# Patient Record
Sex: FEMALE | Race: WHITE | URBAN - METROPOLITAN AREA
[De-identification: names, ages, dates, MRNs, and addresses within clinical notes are randomized per-mention and may not be internally consistent; named-entity substitution may affect disease eponyms.]

---

## 2020-07-21 ENCOUNTER — APPOINTMENT (RX ONLY)
Dept: URBAN - METROPOLITAN AREA CLINIC 349 | Facility: CLINIC | Age: 40
Setting detail: DERMATOLOGY
End: 2020-07-21

## 2020-07-21 DIAGNOSIS — Z12.83 ENCOUNTER FOR SCREENING FOR MALIGNANT NEOPLASM OF SKIN: ICD-10-CM

## 2020-07-21 DIAGNOSIS — D22 MELANOCYTIC NEVI: ICD-10-CM | Status: STABLE

## 2020-07-21 DIAGNOSIS — L82.1 OTHER SEBORRHEIC KERATOSIS: ICD-10-CM

## 2020-07-21 DIAGNOSIS — Z71.89 OTHER SPECIFIED COUNSELING: ICD-10-CM

## 2020-07-21 DIAGNOSIS — Z85.828 PERSONAL HISTORY OF OTHER MALIGNANT NEOPLASM OF SKIN: ICD-10-CM

## 2020-07-21 DIAGNOSIS — I78.1 NEVUS, NON-NEOPLASTIC: ICD-10-CM

## 2020-07-21 DIAGNOSIS — L81.2 FRECKLES: ICD-10-CM

## 2020-07-21 DIAGNOSIS — D18.0 HEMANGIOMA: ICD-10-CM

## 2020-07-21 PROBLEM — D22.61 MELANOCYTIC NEVI OF RIGHT UPPER LIMB, INCLUDING SHOULDER: Status: ACTIVE | Noted: 2020-07-21

## 2020-07-21 PROBLEM — D22.72 MELANOCYTIC NEVI OF LEFT LOWER LIMB, INCLUDING HIP: Status: ACTIVE | Noted: 2020-07-21

## 2020-07-21 PROBLEM — D18.01 HEMANGIOMA OF SKIN AND SUBCUTANEOUS TISSUE: Status: ACTIVE | Noted: 2020-07-21

## 2020-07-21 PROBLEM — D22.5 MELANOCYTIC NEVI OF TRUNK: Status: ACTIVE | Noted: 2020-07-21

## 2020-07-21 PROBLEM — F32.9 MAJOR DEPRESSIVE DISORDER, SINGLE EPISODE, UNSPECIFIED: Status: ACTIVE | Noted: 2020-07-21

## 2020-07-21 PROBLEM — D22.62 MELANOCYTIC NEVI OF LEFT UPPER LIMB, INCLUDING SHOULDER: Status: ACTIVE | Noted: 2020-07-21

## 2020-07-21 PROCEDURE — ? EDUCATIONAL RESOURCES PROVIDED

## 2020-07-21 PROCEDURE — ? COUNSELING

## 2020-07-21 PROCEDURE — 99202 OFFICE O/P NEW SF 15 MIN: CPT

## 2020-07-21 PROCEDURE — ? OTHER

## 2020-07-21 ASSESSMENT — LOCATION ZONE DERM
LOCATION ZONE: TRUNK
LOCATION ZONE: LIP
LOCATION ZONE: LEG
LOCATION ZONE: NECK
LOCATION ZONE: ARM

## 2020-07-21 ASSESSMENT — LOCATION DETAILED DESCRIPTION DERM
LOCATION DETAILED: LEFT MID-UPPER BACK
LOCATION DETAILED: RIGHT ANTERIOR DISTAL THIGH
LOCATION DETAILED: LEFT PROXIMAL CALF
LOCATION DETAILED: RIGHT LATERAL SUPERIOR CHEST
LOCATION DETAILED: RIGHT VENTRAL PROXIMAL FOREARM
LOCATION DETAILED: RIGHT ANTERIOR DISTAL UPPER ARM
LOCATION DETAILED: LEFT MEDIAL TRAPEZIAL NECK
LOCATION DETAILED: LEFT MEDIAL SUPERIOR CHEST
LOCATION DETAILED: LEFT SUPERIOR MEDIAL MIDBACK
LOCATION DETAILED: LEFT ANTERIOR DISTAL UPPER ARM
LOCATION DETAILED: LEFT SUPERIOR UPPER BACK
LOCATION DETAILED: RIGHT MID-UPPER BACK
LOCATION DETAILED: LEFT VENTRAL PROXIMAL FOREARM
LOCATION DETAILED: LEFT RIB CAGE
LOCATION DETAILED: LEFT ANTERIOR PROXIMAL THIGH
LOCATION DETAILED: EPIGASTRIC SKIN
LOCATION DETAILED: LEFT INFERIOR MEDIAL UPPER BACK
LOCATION DETAILED: RIGHT NASOLABIAL FOLD

## 2020-07-21 ASSESSMENT — LOCATION SIMPLE DESCRIPTION DERM
LOCATION SIMPLE: LEFT UPPER BACK
LOCATION SIMPLE: LEFT CALF
LOCATION SIMPLE: LEFT THIGH
LOCATION SIMPLE: CHEST
LOCATION SIMPLE: POSTERIOR NECK
LOCATION SIMPLE: RIGHT UPPER BACK
LOCATION SIMPLE: ABDOMEN
LOCATION SIMPLE: RIGHT LIP
LOCATION SIMPLE: LEFT FOREARM
LOCATION SIMPLE: LEFT UPPER ARM
LOCATION SIMPLE: LEFT LOWER BACK
LOCATION SIMPLE: RIGHT UPPER ARM
LOCATION SIMPLE: RIGHT THIGH
LOCATION SIMPLE: RIGHT FOREARM

## 2020-07-21 NOTE — PROCEDURE: OTHER
Detail Level: Zone
Note Text (......Xxx Chief Complaint.): This diagnosis correlates with the
Other (Free Text): Discussed treatment options. Patient defers treatment at this time.
Other (Free Text): Compared to photos. Lesions appears stable.

## 2020-07-21 NOTE — HPI: SKIN LESION
How Severe Is Your Skin Lesion?: mild
Has Your Skin Lesion Been Treated?: not been treated
Is This A New Presentation, Or A Follow-Up?: Skin Lesion
Which Family Member (Optional)?: Father
Additional History: Patient is here for a full body skin check. Patient denies any lesions of concern and denies any growing, bleeding or changing. Patient denies family or personal history of melanoma but she has a personal and family history of non melanoma skin cancer. She states she is unsure of exactly when her BCC was but it was over five years ago.

## 2021-04-08 ENCOUNTER — ANESTHESIA EVENT (OUTPATIENT)
Dept: ENDOSCOPY | Age: 41
End: 2021-04-08
Payer: COMMERCIAL

## 2021-04-08 ENCOUNTER — HOSPITAL ENCOUNTER (OUTPATIENT)
Dept: ULTRASOUND IMAGING | Age: 41
Discharge: HOME | End: 2021-04-08
Attending: INTERNAL MEDICINE
Payer: COMMERCIAL

## 2021-04-08 VITALS
BODY MASS INDEX: 22.66 KG/M2 | OXYGEN SATURATION: 100 % | SYSTOLIC BLOOD PRESSURE: 102 MMHG | RESPIRATION RATE: 16 BRPM | WEIGHT: 120 LBS | DIASTOLIC BLOOD PRESSURE: 58 MMHG | HEART RATE: 76 BPM | TEMPERATURE: 99.2 F | HEIGHT: 61 IN

## 2021-04-08 DIAGNOSIS — K74.69 OTHER CIRRHOSIS OF LIVER (HCC): ICD-10-CM

## 2021-04-08 PROCEDURE — 74011000250 HC RX REV CODE- 250: Performed by: RADIOLOGY

## 2021-04-08 PROCEDURE — 88307 TISSUE EXAM BY PATHOLOGIST: CPT

## 2021-04-08 PROCEDURE — 88312 SPECIAL STAINS GROUP 1: CPT

## 2021-04-08 PROCEDURE — 99153 MOD SED SAME PHYS/QHP EA: CPT

## 2021-04-08 PROCEDURE — 99152 MOD SED SAME PHYS/QHP 5/>YRS: CPT

## 2021-04-08 PROCEDURE — 77030036720 US GUIDE BX LIV PERC

## 2021-04-08 PROCEDURE — 88313 SPECIAL STAINS GROUP 2: CPT

## 2021-04-08 PROCEDURE — 74011250636 HC RX REV CODE- 250/636: Performed by: RADIOLOGY

## 2021-04-08 RX ORDER — MORPHINE SULFATE 2 MG/ML
1 INJECTION, SOLUTION INTRAMUSCULAR; INTRAVENOUS
Status: DISCONTINUED | OUTPATIENT
Start: 2021-04-08 | End: 2021-04-12 | Stop reason: HOSPADM

## 2021-04-08 RX ORDER — FENTANYL CITRATE 50 UG/ML
25-100 INJECTION, SOLUTION INTRAMUSCULAR; INTRAVENOUS
Status: DISCONTINUED | OUTPATIENT
Start: 2021-04-08 | End: 2021-04-12 | Stop reason: HOSPADM

## 2021-04-08 RX ORDER — IBUPROFEN 200 MG
600 TABLET ORAL
Status: DISCONTINUED | OUTPATIENT
Start: 2021-04-08 | End: 2021-04-12 | Stop reason: HOSPADM

## 2021-04-08 RX ORDER — OXYCODONE HYDROCHLORIDE 5 MG/1
10 TABLET ORAL
Status: DISCONTINUED | OUTPATIENT
Start: 2021-04-08 | End: 2021-04-12 | Stop reason: HOSPADM

## 2021-04-08 RX ORDER — SODIUM CHLORIDE 9 MG/ML
150 INJECTION, SOLUTION INTRAVENOUS CONTINUOUS
Status: DISCONTINUED | OUTPATIENT
Start: 2021-04-08 | End: 2021-04-09 | Stop reason: HOSPADM

## 2021-04-08 RX ORDER — SODIUM CHLORIDE, SODIUM LACTATE, POTASSIUM CHLORIDE, CALCIUM CHLORIDE 600; 310; 30; 20 MG/100ML; MG/100ML; MG/100ML; MG/100ML
100 INJECTION, SOLUTION INTRAVENOUS CONTINUOUS
Status: CANCELLED | OUTPATIENT
Start: 2021-04-08

## 2021-04-08 RX ORDER — MIDAZOLAM HYDROCHLORIDE 1 MG/ML
.5-2 INJECTION, SOLUTION INTRAMUSCULAR; INTRAVENOUS
Status: DISCONTINUED | OUTPATIENT
Start: 2021-04-08 | End: 2021-04-12 | Stop reason: HOSPADM

## 2021-04-08 RX ORDER — DIPHENHYDRAMINE HYDROCHLORIDE 50 MG/ML
25-50 INJECTION, SOLUTION INTRAMUSCULAR; INTRAVENOUS ONCE
Status: COMPLETED | OUTPATIENT
Start: 2021-04-08 | End: 2021-04-08

## 2021-04-08 RX ORDER — LIDOCAINE HYDROCHLORIDE 20 MG/ML
1-20 INJECTION, SOLUTION INFILTRATION; PERINEURAL ONCE
Status: COMPLETED | OUTPATIENT
Start: 2021-04-08 | End: 2021-04-08

## 2021-04-08 RX ADMIN — MIDAZOLAM 1 MG: 1 INJECTION INTRAMUSCULAR; INTRAVENOUS at 10:26

## 2021-04-08 RX ADMIN — MIDAZOLAM 0.5 MG: 1 INJECTION INTRAMUSCULAR; INTRAVENOUS at 10:33

## 2021-04-08 RX ADMIN — DIPHENHYDRAMINE HYDROCHLORIDE 50 MG: 50 INJECTION, SOLUTION INTRAMUSCULAR; INTRAVENOUS at 10:04

## 2021-04-08 RX ADMIN — FENTANYL CITRATE 50 MCG: 50 INJECTION, SOLUTION INTRAMUSCULAR; INTRAVENOUS at 10:26

## 2021-04-08 RX ADMIN — LIDOCAINE HYDROCHLORIDE 9 ML: 20 INJECTION, SOLUTION INFILTRATION; PERINEURAL at 10:43

## 2021-04-08 RX ADMIN — FENTANYL CITRATE 25 MCG: 50 INJECTION, SOLUTION INTRAMUSCULAR; INTRAVENOUS at 10:33

## 2021-04-08 NOTE — H&P
Department of Interventional Radiology  [phone]    History and Physical    Patient:  Adan Petersen MRN:  [id number]  SSN:  [id number]    YOB: 1980  Age:  36 y.o. Sex:  female      Primary Care Provider:  UNKNOWN  Referring Physician:  Almaz Esparza MD    Subjective:     Chief Complaint: biopsy    History of the Present Illness: The patient is a 36 y.o. female with cryptogenic cirrhosis, elevated LFTs who presents for random liver biopsy. Epigastric pain, imaging demonstrates mildly nodular liver with some fatty changes, portal HTN splenomegaly. NPO. Endoscopy scheduled for tomorrow. Past Medical History:   Diagnosis Date    Depression     medications     GERD (gastroesophageal reflux disease)     medications    Liver disease 2021    patient states that \"something is wrong with my liver\" but states has not been diagnosed.  Low iron      Past Surgical History:   Procedure Laterality Date    HX APPENDECTOMY      HX  SECTION      HX DILATION AND CURETTAGE      HX TONSILLECTOMY          Review of Systems:    Pertinent items are noted in the History of Present Illness. Prior to Admission medications    Medication Sig Start Date End Date Taking? Authorizing Provider   cetirizine (ZYRTEC) 10 mg tablet Take 10 mg by mouth daily. 20   Provider, Historical   FLUoxetine (PROzac) 40 mg capsule Take 80 mg by mouth daily. 20   Provider, Historical   magnesium oxide (MAG-OX) 400 mg tablet Take  by mouth daily. Provider, Historical   therapeutic multivitamin (THERAGRAN) tablet Take 1 Tab by mouth daily. Provider, Historical   valACYclovir (VALTREX) 1 gram tablet Take 1,000 mg by mouth daily. 20   Provider, Historical   pantoprazole (PROTONIX) 20 mg tablet Take 20 mg by mouth daily. Provider, Historical        Allergies   Allergen Reactions    Sulfa (Sulfonamide Antibiotics) Rash       History reviewed.  No pertinent family history. Social History     Tobacco Use    Smoking status: Never Smoker    Smokeless tobacco: Never Used   Substance Use Topics    Alcohol use: Not Currently        Objective:       Physical Examination:    Vitals:    04/08/21 0839   BP: (!) 101/55   Pulse: 81   Resp: 18   Temp: 99.2 °F (37.3 °C)   SpO2: 99%   Weight: 54.4 kg (120 lb)   Height: 5' 1\" (1.549 m)       Pain Assessment  Pain Intensity 1: 0 (04/08/21 0839)               HEART: regular rate and rhythm  LUNG: clear to auscultation bilaterally  ABDOMEN: normal findings: soft, non-tender  EXTREMITIES: warm, no edema    Laboratory:     No results found for: NA, K, CL, CO2, AGAP, GLU, BUN, CREA, GFRAA, GFRNA, CA, MG, PHOS, ALB, TBIL, TP, ALB, GLOB, AGRAT, ALT  No results found for: WBC, HGB, HCT, PLT, HGBEXT, HCTEXT, PLTEXT  No results found for: APTT, PTP, INR, INREXT    Assessment:     Cirrhosis, elevated LFTs        Plan:     Planned Procedure:  Liver biopsy    Risks, benefits, and alternatives reviewed with patient and she agrees to proceed with the procedure.       Signed By: Laura Nash PA-C     April 8, 2021

## 2021-04-08 NOTE — PROCEDURES
Department of Interventional Radiology  [phone]        Interventional Radiology Brief Procedure Note    Patient: Brooks Garnett MRN: [id number]  SSN: [id number]    YOB: 1980  Age: 36 y.o. Sex: female      Date of Procedure: 4/8/2021    Pre-Procedure Diagnosis: Elevated LFts    Post-Procedure Diagnosis: SAME    Procedure(s): Image Guided Biopsy    Brief Description of Procedure: Left lobe liver core biopsy. Performed By: Snehal Carter MD     Assistants: None    Anesthesia:Moderate Sedation    Estimated Blood Loss: Less than 10ml    Specimens:  Pathology    Implants:  None    Findings: Nodular surface.      Complications: None    Recommendations: 1 hour bedrest.       Follow Up: Dr Cj Saunders    Signed By: Snehal Carter MD     April 8, 2021

## 2021-04-08 NOTE — DISCHARGE INSTRUCTIONS
[address]  Department of Interventional Radiology  [facility]  [phone] Office  [phone] Fax    BIOPSY DISCHARGE INSTRUCTIONS    General Instructions:     A biopsy is the removal of a small piece of tissue for microscopic examination or testing. Healthy tissue can be obtained for the purpose of tissue-type matching for transplants. Unhealthy tissues are more commonly biopsied to diagnose disease. Lung Biopsy:     A needle lung biopsy is performed when there is a mass discovered in the lung area. The most serious risk is infection, bleeding, and pneumothorax (a collapsed lung). Signs of pneumothorax include shortness of breath, rapid heart rate, and blueness of the skin. If any of these occur, call 911. Liver Biopsy: This test helps detect cancer, infections, and the cause of an enlargement of the liver or elevated liver enzymes. It also helps to diagnose a number of liver diseases. The pain associated with the procedure may be felt in the shoulder. The risks include internal bleeding, pneumothorax, and injury to the surrounding organs. Renal Biopsy: This procedure is sometimes done to evaluate a transplanted kidney. It is also used to evaluate unexplained decrease in kidney function, blood, or protein in the urine. You may see bright red blood in the urine the first 24 hours after the test. If the bleeding lasts longer, you need to call your doctor. There is a risk of infection and bleeding into the muscle, which may cause soreness. Spinal Biopsy: This test is sometimes done in conjunction with other procedures. Your back will be sore, as we are taking a small sample of bone, which is slightly more difficult to sample than tissue. General Biopsy:     A mass can grow in any area of the body, and we are taking a specimen as ordered by your doctor. The risks are the same.  They include bleeding, pain, and infection. Home Care Instructions: You may resume your regular diet and medication regimen. Do not drink alcohol, drive, or make any important legal decisions in the next 24 hours. Do not lift anything heavier than a gallon of milk until the soreness goes away. You may use over the counter acetaminophen or ibuprofen for the soreness. You may apply an ice pack to the affected area for 20-30 minutes at time for the first 24 hours. After that, you may apply a heat pack. Call If: You should call your Physician and/or the Radiology Nurse if you have any questions or concerns about the biopsy site. Call if you should have increased pain, fever, redness, drainage, or bleeding more than a small spot on the bandage. Follow-Up Instructions: Please see your ordering doctor as he/she has requested. To Reach Us: If you have any questions about your procedure, please call the Interventional Radiology department at [phone]. After business hours (5pm) and weekends, call the answering service at [phone] and ask for the Radiologist on call to be paged. Si tiene Preguntas acerca del procedimiento, por favor llame al departamento de Radiología Intervencional al [phone]. Después de horas de oficina (5 pm) y los fines de [place], llamar al [facility] al [phone] y pregunte por el Radiologo de [facility]. Interventional Radiology General Nurse Discharge    After general anesthesia or intravenous sedation, for 24 hours or while taking prescription Narcotics:  · Limit your activities  · Do not drive and operate hazardous machinery  · Do not make important personal or business decisions  · Do  not drink alcoholic beverages  · If you have not urinated within 8 hours after discharge, please contact your surgeon on call. * Please give a list of your current medications to your Primary Care Provider.   * Please update this list whenever your medications are discontinued, doses are changed, or new medications (including over-the-counter products) are added. * Please carry medication information at all times in case of emergency situations. These are general instructions for a healthy lifestyle:    No smoking/ No tobacco products/ Avoid exposure to second hand smoke  Surgeon General's Warning:  Quitting smoking now greatly reduces serious risk to your health. Obesity, smoking, and sedentary lifestyle greatly increases your risk for illness  A healthy diet, regular physical exercise & weight monitoring are important for maintaining a healthy lifestyle    You may be retaining fluid if you have a history of heart failure or if you experience any of the following symptoms:  Weight gain of 3 pounds or more overnight or 5 pounds in a week, increased swelling in our hands or feet or shortness of breath while lying flat in bed. Please call your doctor as soon as you notice any of these symptoms; do not wait until your next office visit. Recognize signs and symptoms of STROKE:  F-face looks uneven    A-arms unable to move or move unevenly    S-speech slurred or non-existent    T-time-call 911 as soon as signs and symptoms begin-DO NOT go       Back to bed or wait to see if you get better-TIME IS BRAIN.       Patient Signature:  Date: 4/8/2021  Discharging Nurse: Reina Tello

## 2021-04-08 NOTE — PROGRESS NOTES
Recovery period without difficulty. Pt alert and oriented and denies pain. Dressing is clean, dry, and intact. Reviewed discharge instructions with patient and familly, both verbalized understanding. Pt escorted to lobby discharge area via wheelchair. Vital signs and Ac score completed.

## 2021-04-09 ENCOUNTER — HOSPITAL ENCOUNTER (OUTPATIENT)
Age: 41
Setting detail: OUTPATIENT SURGERY
Discharge: HOME | End: 2021-04-09
Attending: INTERNAL MEDICINE | Admitting: INTERNAL MEDICINE
Payer: COMMERCIAL

## 2021-04-09 ENCOUNTER — ANESTHESIA (OUTPATIENT)
Dept: ENDOSCOPY | Age: 41
End: 2021-04-09
Payer: COMMERCIAL

## 2021-04-09 VITALS
TEMPERATURE: 98.6 F | WEIGHT: 120 LBS | RESPIRATION RATE: 16 BRPM | SYSTOLIC BLOOD PRESSURE: 90 MMHG | BODY MASS INDEX: 22.66 KG/M2 | DIASTOLIC BLOOD PRESSURE: 55 MMHG | HEIGHT: 61 IN | HEART RATE: 86 BPM | OXYGEN SATURATION: 98 %

## 2021-04-09 PROCEDURE — 76060000031 HC ANESTHESIA FIRST 0.5 HR: Performed by: INTERNAL MEDICINE

## 2021-04-09 PROCEDURE — 77030021593 HC FCPS BIOP ENDOSC BSC -A: Performed by: INTERNAL MEDICINE

## 2021-04-09 PROCEDURE — 74011250636 HC RX REV CODE- 250/636: Performed by: ANESTHESIOLOGY

## 2021-04-09 PROCEDURE — 76040000025: Performed by: INTERNAL MEDICINE

## 2021-04-09 PROCEDURE — 74011000250 HC RX REV CODE- 250: Performed by: REGISTERED NURSE

## 2021-04-09 PROCEDURE — 88305 TISSUE EXAM BY PATHOLOGIST: CPT

## 2021-04-09 PROCEDURE — 2709999900 HC NON-CHARGEABLE SUPPLY: Performed by: INTERNAL MEDICINE

## 2021-04-09 PROCEDURE — 88312 SPECIAL STAINS GROUP 1: CPT

## 2021-04-09 PROCEDURE — 74011250636 HC RX REV CODE- 250/636: Performed by: REGISTERED NURSE

## 2021-04-09 RX ORDER — PROPOFOL 10 MG/ML
INJECTION, EMULSION INTRAVENOUS
Status: DISCONTINUED | OUTPATIENT
Start: 2021-04-09 | End: 2021-04-09 | Stop reason: HOSPADM

## 2021-04-09 RX ORDER — LIDOCAINE HYDROCHLORIDE 20 MG/ML
INJECTION, SOLUTION EPIDURAL; INFILTRATION; INTRACAUDAL; PERINEURAL AS NEEDED
Status: DISCONTINUED | OUTPATIENT
Start: 2021-04-09 | End: 2021-04-09 | Stop reason: HOSPADM

## 2021-04-09 RX ORDER — PROPOFOL 10 MG/ML
INJECTION, EMULSION INTRAVENOUS AS NEEDED
Status: DISCONTINUED | OUTPATIENT
Start: 2021-04-09 | End: 2021-04-09 | Stop reason: HOSPADM

## 2021-04-09 RX ORDER — SODIUM CHLORIDE, SODIUM LACTATE, POTASSIUM CHLORIDE, CALCIUM CHLORIDE 600; 310; 30; 20 MG/100ML; MG/100ML; MG/100ML; MG/100ML
100 INJECTION, SOLUTION INTRAVENOUS CONTINUOUS
Status: DISCONTINUED | OUTPATIENT
Start: 2021-04-09 | End: 2021-04-12 | Stop reason: HOSPADM

## 2021-04-09 RX ADMIN — PROPOFOL 200 MCG/KG/MIN: 10 INJECTION, EMULSION INTRAVENOUS at 15:20

## 2021-04-09 RX ADMIN — LIDOCAINE HYDROCHLORIDE 80 MG: 20 INJECTION, SOLUTION EPIDURAL; INFILTRATION; INTRACAUDAL; PERINEURAL at 15:19

## 2021-04-09 RX ADMIN — PHENYLEPHRINE HYDROCHLORIDE 50 MCG: 10 INJECTION INTRAVENOUS at 15:31

## 2021-04-09 RX ADMIN — PHENYLEPHRINE HYDROCHLORIDE 50 MCG: 10 INJECTION INTRAVENOUS at 15:25

## 2021-04-09 RX ADMIN — PROPOFOL 20 MG: 10 INJECTION, EMULSION INTRAVENOUS at 15:21

## 2021-04-09 RX ADMIN — SODIUM CHLORIDE, SODIUM LACTATE, POTASSIUM CHLORIDE, AND CALCIUM CHLORIDE 100 ML/HR: 600; 310; 30; 20 INJECTION, SOLUTION INTRAVENOUS at 14:04

## 2021-04-09 RX ADMIN — PROPOFOL 60 MG: 10 INJECTION, EMULSION INTRAVENOUS at 15:20

## 2021-04-09 RX ADMIN — PHENYLEPHRINE HYDROCHLORIDE 50 MCG: 10 INJECTION INTRAVENOUS at 15:28

## 2021-04-09 NOTE — H&P
Gastroenterology Outpatient History and Physical    Patient: Derik Ansari [facility]    Physician: Deepa Pat MD    Vital Signs: Blood pressure (!) 99/57, pulse 84, temperature 98.9 °F (37.2 °C), resp. rate 17, height 5' 1\" (1.549 m), weight 54.4 kg (120 lb), last menstrual period 2021, SpO2 100 %, not currently breastfeeding. Allergies: Allergies   Allergen Reactions    Sulfa (Sulfonamide Antibiotics) Rash       Chief Complaint: liver cirrhosis, screening for varices    History of Present Illness: As Above    Justification for Procedure: As Above    History:  Past Medical History:   Diagnosis Date    Depression     medications     GERD (gastroesophageal reflux disease)     medications    Liver disease 2021    patient states that \"something is wrong with my liver\" but states has not been diagnosed.  Low iron       Past Surgical History:   Procedure Laterality Date    HX APPENDECTOMY      HX  SECTION      HX DILATION AND CURETTAGE      HX TONSILLECTOMY        Social History     Socioeconomic History    Marital status:      Spouse name: Not on file    Number of children: Not on file    Years of education: Not on file    Highest education level: Not on file   Tobacco Use    Smoking status: Never Smoker    Smokeless tobacco: Never Used   Substance and Sexual Activity    Alcohol use: Not Currently    Drug use: Not Currently    History reviewed. No pertinent family history. Medications:   Prior to Admission medications    Medication Sig Start Date End Date Taking? Authorizing Provider   cetirizine (ZYRTEC) 10 mg tablet Take 10 mg by mouth daily. 20  Yes Provider, Historical   FLUoxetine (PROzac) 40 mg capsule Take 80 mg by mouth daily. 20  Yes Provider, Historical   magnesium oxide (MAG-OX) 400 mg tablet Take  by mouth daily. Yes Provider, Historical   therapeutic multivitamin (THERAGRAN) tablet Take 1 Tab by mouth daily.    Yes Provider, Historical   valACYclovir (VALTREX) 1 gram tablet Take 1,000 mg by mouth daily. 7/29/20  Yes Provider, Historical   pantoprazole (PROTONIX) 20 mg tablet Take 20 mg by mouth daily.    Yes Provider, Historical       Physical Exam:         Heart: regular rate and rhythm, S1, S2 normal, no murmur, click, rub or gallop   Lungs: chest clear, no wheezing, rales, normal symmetric air entry, Heart exam - S1, S2 normal, no murmur, no gallop, rate regular   Abdominal: Bowel sounds are normal, Bowel sounds active, liver is not enlarged, spleen is not enlarged           Findings/Diagnosis:  liver cirrhosis, screening for varices    EGD with possible banding        Signed:  Otis Robison MD 4/9/2021

## 2021-04-09 NOTE — PROGRESS NOTES
Patient's BP 91/52.  [facility] aware. Patient verbalized her baseline BP runs low and that she is feeling fine. Minor drowsiness due to post anesthesia. [facility].

## 2021-04-09 NOTE — ANESTHESIA POSTPROCEDURE EVALUATION
Procedure(s):  ESOPHAGOGASTRODUODENOSCOPY (EGD) W/ BANDING OF VARICES/ BMI 22  ESOPHAGOGASTRODUODENAL (EGD) BIOPSY. total IV anesthesia    Anesthesia Post Evaluation        Patient location during evaluation: PACU  Patient participation: complete - patient participated  Level of consciousness: awake and alert  Pain management: adequate  Airway patency: patent  Anesthetic complications: no  Cardiovascular status: acceptable  Respiratory status: acceptable  Hydration status: acceptable  Post anesthesia nausea and vomiting:  none  Final Post Anesthesia Temperature Assessment:  Normothermia (36.0-37.5 degrees C)      INITIAL Post-op Vital signs:   Vitals Value Taken Time   BP 90/55 04/09/21 1621   Temp     Pulse 81 04/09/21 1624   Resp 16 04/09/21 1601   SpO2 99 % 04/09/21 1624   Vitals shown include unvalidated device data.

## 2021-04-09 NOTE — ROUTINE PROCESS
Patient denies any needs. Vital signs stable. Discharge instructions given to patient and . No other concerns noted at this time. Patient wheeled out to car via wheelchair with staff. IV acces out.

## 2021-04-09 NOTE — ANESTHESIA PREPROCEDURE EVALUATION
Relevant Problems   No relevant active problems       Anesthetic History   No history of anesthetic complications            Review of Systems / Medical History  Patient summary reviewed and pertinent labs reviewed    Pulmonary  Within defined limits                 Neuro/Psych         Psychiatric history     Cardiovascular  Within defined limits                Exercise tolerance: >4 METS     GI/Hepatic/Renal     GERD: well controlled      Liver disease (cryptogenic cirrhosis)     Endo/Other  Within defined limits           Other Findings              Physical Exam    Airway  Mallampati: II  TM Distance: 4 - 6 cm  Neck ROM: normal range of motion   Mouth opening: Normal     Cardiovascular  Regular rate and rhythm,  S1 and S2 normal,  no murmur, click, rub, or gallop             Dental  No notable dental hx       Pulmonary  Breath sounds clear to auscultation               Abdominal  GI exam deferred       Other Findings            Anesthetic Plan    ASA: 3  Anesthesia type: total IV anesthesia          Induction: Intravenous  Anesthetic plan and risks discussed with: Patient

## 2021-04-09 NOTE — PROCEDURES
Esophagogastroduodenoscopy    DATE of PROCEDURE: 4/9/2021    INDICATION:  1. Screening for esophageal varices    POSTPROCEDURE DIAGNOSIS:  1. Esophageal varix  2. Portal hypertensive gastropathy  3. Gastritis    MEDICATIONS ADMINISTERED: MAC. Further details per anesthesia note. INSTRUMENT: [id number]    PROCEDURE:  After obtaining informed consent, the patient was placed in the left lateral position and sedated. The endoscope was advanced under direct vision without difficulty. The esophagus, stomach (including retroflexed views) and duodenum were evaluated. The patient was taken to the recovery area in stable condition. FINDINGS:  ESOPHAGUS: Single grade I varix without stigmata of bleeding. Otherwise normal exam.  STOMACH: Negative for gastric varices. Mild PHG changes in proximal body and fundus. Mild gastritis in antrum and distal body with a few erosions. Cold-forceps biopsies for pathology. Otherwise normal exam.  DUODENUM: Normal    Estimated blood loss: 0-minimal   Specimens obtained during procedure:   1. Gastric biopsies    PLAN:  1. Follow-up pathology  2. Avoid NSAID use  3. Continue pantoprazole 20 mg PO daily  4. Repeat EGD in 1 year at hospital for surveillance of varix  5.  Follow-up in office in 8 weeks    Leydi Colbert MD

## 2021-04-13 ENCOUNTER — APPOINTMENT (OUTPATIENT)
Dept: CT IMAGING | Age: 41
End: 2021-04-13
Attending: EMERGENCY MEDICINE
Payer: COMMERCIAL

## 2021-04-13 ENCOUNTER — HOSPITAL ENCOUNTER (EMERGENCY)
Age: 41
Discharge: HOME | End: 2021-04-13
Attending: EMERGENCY MEDICINE
Payer: COMMERCIAL

## 2021-04-13 VITALS
SYSTOLIC BLOOD PRESSURE: 100 MMHG | HEIGHT: 61 IN | RESPIRATION RATE: 24 BRPM | WEIGHT: 120 LBS | TEMPERATURE: 98.2 F | DIASTOLIC BLOOD PRESSURE: 54 MMHG | BODY MASS INDEX: 22.66 KG/M2 | OXYGEN SATURATION: 99 % | HEART RATE: 89 BPM

## 2021-04-13 DIAGNOSIS — K29.90 GASTRITIS AND DUODENITIS: ICD-10-CM

## 2021-04-13 DIAGNOSIS — R16.1 SPLEEN ENLARGED: Primary | ICD-10-CM

## 2021-04-13 LAB
ALBUMIN SERPL-MCNC: 3.1 G/DL (ref 3.5–5)
ALBUMIN/GLOB SERPL: 0.9 {RATIO} (ref 1.2–3.5)
ALP SERPL-CCNC: 103 U/L (ref 50–136)
ALT SERPL-CCNC: 77 U/L (ref 12–65)
ANION GAP SERPL CALC-SCNC: 6 MMOL/L (ref 7–16)
AST SERPL-CCNC: 83 U/L (ref 15–37)
BASOPHILS # BLD: 0 K/UL (ref 0–0.2)
BASOPHILS NFR BLD: 1 % (ref 0–2)
BILIRUB SERPL-MCNC: 1.6 MG/DL (ref 0.2–1.1)
BUN SERPL-MCNC: 11 MG/DL (ref 6–23)
CALCIUM SERPL-MCNC: 9.4 MG/DL (ref 8.3–10.4)
CHLORIDE SERPL-SCNC: 104 MMOL/L (ref 98–107)
CO2 SERPL-SCNC: 25 MMOL/L (ref 21–32)
CREAT SERPL-MCNC: 0.8 MG/DL (ref 0.6–1)
DIFFERENTIAL METHOD BLD: ABNORMAL
EOSINOPHIL # BLD: 0.2 K/UL (ref 0–0.8)
EOSINOPHIL NFR BLD: 2 % (ref 0.5–7.8)
ERYTHROCYTE [DISTWIDTH] IN BLOOD BY AUTOMATED COUNT: 13.6 % (ref 11.9–14.6)
GLOBULIN SER CALC-MCNC: 3.6 G/DL (ref 2.3–3.5)
GLUCOSE SERPL-MCNC: 104 MG/DL (ref 65–100)
HCT VFR BLD AUTO: 31.2 % (ref 35.8–46.3)
HGB BLD-MCNC: 10.1 G/DL (ref 11.7–15.4)
IMM GRANULOCYTES # BLD AUTO: 0 K/UL (ref 0–0.5)
IMM GRANULOCYTES NFR BLD AUTO: 0 % (ref 0–5)
LIPASE SERPL-CCNC: 133 U/L (ref 73–393)
LYMPHOCYTES # BLD: 1.2 K/UL (ref 0.5–4.6)
LYMPHOCYTES NFR BLD: 16 % (ref 13–44)
MCH RBC QN AUTO: 29.4 PG (ref 26.1–32.9)
MCHC RBC AUTO-ENTMCNC: 32.4 G/DL (ref 31.4–35)
MCV RBC AUTO: 91 FL (ref 79.6–97.8)
MONOCYTES # BLD: 0.7 K/UL (ref 0.1–1.3)
MONOCYTES NFR BLD: 9 % (ref 4–12)
NEUTS SEG # BLD: 5.4 K/UL (ref 1.7–8.2)
NEUTS SEG NFR BLD: 73 % (ref 43–78)
NRBC # BLD: 0 K/UL (ref 0–0.2)
PLATELET # BLD AUTO: 133 K/UL (ref 150–450)
PMV BLD AUTO: 10.3 FL (ref 9.4–12.3)
POTASSIUM SERPL-SCNC: 4.1 MMOL/L (ref 3.5–5.1)
PROT SERPL-MCNC: 6.7 G/DL (ref 6.3–8.2)
RBC # BLD AUTO: 3.43 M/UL (ref 4.05–5.2)
SODIUM SERPL-SCNC: 135 MMOL/L (ref 136–145)
WBC # BLD AUTO: 7.5 K/UL (ref 4.3–11.1)

## 2021-04-13 PROCEDURE — 74177 CT ABD & PELVIS W/CONTRAST: CPT

## 2021-04-13 PROCEDURE — 74011250637 HC RX REV CODE- 250/637: Performed by: EMERGENCY MEDICINE

## 2021-04-13 PROCEDURE — 96375 TX/PRO/DX INJ NEW DRUG ADDON: CPT

## 2021-04-13 PROCEDURE — 74011000636 HC RX REV CODE- 636: Performed by: EMERGENCY MEDICINE

## 2021-04-13 PROCEDURE — 80053 COMPREHEN METABOLIC PANEL: CPT

## 2021-04-13 PROCEDURE — 74011000258 HC RX REV CODE- 258: Performed by: EMERGENCY MEDICINE

## 2021-04-13 PROCEDURE — 74011000250 HC RX REV CODE- 250: Performed by: EMERGENCY MEDICINE

## 2021-04-13 PROCEDURE — 96374 THER/PROPH/DIAG INJ IV PUSH: CPT

## 2021-04-13 PROCEDURE — 81003 URINALYSIS AUTO W/O SCOPE: CPT

## 2021-04-13 PROCEDURE — 74011250636 HC RX REV CODE- 250/636: Performed by: EMERGENCY MEDICINE

## 2021-04-13 PROCEDURE — 99285 EMERGENCY DEPT VISIT HI MDM: CPT

## 2021-04-13 PROCEDURE — 85025 COMPLETE CBC W/AUTO DIFF WBC: CPT

## 2021-04-13 PROCEDURE — 83690 ASSAY OF LIPASE: CPT

## 2021-04-13 RX ORDER — ONDANSETRON 2 MG/ML
4 INJECTION INTRAMUSCULAR; INTRAVENOUS
Status: COMPLETED | OUTPATIENT
Start: 2021-04-13 | End: 2021-04-13

## 2021-04-13 RX ORDER — OXYCODONE HYDROCHLORIDE 5 MG/1
5 TABLET ORAL
Qty: 12 TAB | Refills: 0 | Status: SHIPPED | OUTPATIENT
Start: 2021-04-13 | End: 2021-04-16

## 2021-04-13 RX ORDER — MAG HYDROX/ALUMINUM HYD/SIMETH 200-200-20
30 SUSPENSION, ORAL (FINAL DOSE FORM) ORAL
Status: COMPLETED | OUTPATIENT
Start: 2021-04-13 | End: 2021-04-13

## 2021-04-13 RX ORDER — SODIUM CHLORIDE 0.9 % (FLUSH) 0.9 %
10 SYRINGE (ML) INJECTION
Status: COMPLETED | OUTPATIENT
Start: 2021-04-13 | End: 2021-04-13

## 2021-04-13 RX ORDER — MORPHINE SULFATE 4 MG/ML
4 INJECTION INTRAVENOUS
Status: COMPLETED | OUTPATIENT
Start: 2021-04-13 | End: 2021-04-13

## 2021-04-13 RX ORDER — LIDOCAINE HYDROCHLORIDE 20 MG/ML
15 SOLUTION OROPHARYNGEAL
Status: COMPLETED | OUTPATIENT
Start: 2021-04-13 | End: 2021-04-13

## 2021-04-13 RX ADMIN — Medication 10 ML: at 06:11

## 2021-04-13 RX ADMIN — DIATRIZOATE MEGLUMINE AND DIATRIZOATE SODIUM 15 ML: 660; 100 LIQUID ORAL; RECTAL at 04:44

## 2021-04-13 RX ADMIN — SODIUM CHLORIDE 1000 ML: 900 INJECTION, SOLUTION INTRAVENOUS at 04:44

## 2021-04-13 RX ADMIN — SODIUM CHLORIDE 100 ML: 900 INJECTION, SOLUTION INTRAVENOUS at 06:11

## 2021-04-13 RX ADMIN — ONDANSETRON 4 MG: 2 INJECTION INTRAMUSCULAR; INTRAVENOUS at 04:44

## 2021-04-13 RX ADMIN — MORPHINE SULFATE 4 MG: 4 INJECTION INTRAVENOUS at 04:44

## 2021-04-13 RX ADMIN — IOPAMIDOL 100 ML: 755 INJECTION, SOLUTION INTRAVENOUS at 06:11

## 2021-04-13 RX ADMIN — ALUMINUM HYDROXIDE, MAGNESIUM HYDROXIDE, AND SIMETHICONE 30 ML: 200; 200; 20 SUSPENSION ORAL at 04:45

## 2021-04-13 RX ADMIN — LIDOCAINE HYDROCHLORIDE 15 ML: 20 SOLUTION ORAL; TOPICAL at 04:44

## 2021-04-13 NOTE — ED NOTES
I have reviewed discharge instructions with the patient. The patient verbalized understanding. Patient left ED via Discharge Method: ambulatory to Home with self. Opportunity for questions and clarification provided. Patient given 1 scripts. To continue your aftercare when you leave the hospital, you may receive an automated call from our care team to check in on how you are doing. This is a free service and part of our promise to provide the best care and service to meet your aftercare needs.  If you have questions, or wish to unsubscribe from this service please call [phone]. Thank you for Choosing our [facility] Department.

## 2021-04-13 NOTE — ED TRIAGE NOTES
Patient states upper left abdominal pain. States had biopsy on right side last week. Denies nausea, vomiting, diarrhea. States took tylenol earlier and contacted [facility] and was told it might be gas from the procedure. States pain has continued.

## 2021-04-13 NOTE — ED PROVIDER NOTES
Patient with newly diagnosed liver disease status post biopsy on Thursday and EGD on Friday. Did well over the weekend but developed some epigastric and left upper quadrant abdominal pain Monday. Has continued and getting worse so came in. No nausea vomiting or diarrhea. No dysuria hematuria. The history is provided by the patient. No  was used. Abdominal Pain   This is a new problem. The current episode started 2 days ago. The problem occurs constantly. The problem has been gradually worsening. The pain is associated with an unknown factor. The pain is located in the epigastric region and LUQ. The pain is moderate. Pertinent negatives include no fever, no diarrhea, no melena, no nausea, no vomiting, no constipation, no dysuria, no hematuria, no headaches, no chest pain and no back pain. The pain is worsened by palpation. The pain is relieved by nothing. Her past medical history is significant for GERD. The patient's surgical history includes appendectomy. Past Medical History:   Diagnosis Date    Depression     medications     GERD (gastroesophageal reflux disease)     medications    Liver disease 2021    patient states that \"something is wrong with my liver\" but states has not been diagnosed.  Low iron        Past Surgical History:   Procedure Laterality Date    HX APPENDECTOMY      HX  SECTION      HX DILATION AND CURETTAGE      HX TONSILLECTOMY           No family history on file.     Social History     Socioeconomic History    Marital status:      Spouse name: Not on file    Number of children: Not on file    Years of education: Not on file    Highest education level: Not on file   Occupational History    Not on file   Social Needs    Financial resource strain: Not on file    Food insecurity     Worry: Not on file     Inability: Not on file    Transportation needs     Medical: Not on file     Non-medical: Not on file   Tobacco Use    Smoking status: Never Smoker    Smokeless tobacco: Never Used   Substance and Sexual Activity    Alcohol use: Not Currently    Drug use: Not Currently    Sexual activity: Not on file   Lifestyle    Physical activity     Days per week: Not on file     Minutes per session: Not on file    Stress: Not on file   Relationships    Social connections     Talks on phone: Not on file     Gets together: Not on file     Attends Yarsani service: Not on file     Active member of club or organization: Not on file     Attends meetings of clubs or organizations: Not on file     Relationship status: Not on file    Intimate partner violence     Fear of current or ex partner: Not on file     Emotionally abused: Not on file     Physically abused: Not on file     Forced sexual activity: Not on file   Other Topics Concern    Not on file   Social History Narrative    Not on file         ALLERGIES: Sulfa (sulfonamide antibiotics)    Review of Systems   Constitutional: Negative for chills and fever. HENT: Negative for rhinorrhea and sore throat. Eyes: Negative for pain and redness. Respiratory: Negative for chest tightness, shortness of breath and wheezing. Cardiovascular: Negative for chest pain and leg swelling. Gastrointestinal: Positive for abdominal pain. Negative for constipation, diarrhea, melena, nausea and vomiting. Genitourinary: Negative for dysuria and hematuria. Musculoskeletal: Negative for back pain, gait problem, neck pain and neck stiffness. Skin: Negative for color change and rash. Neurological: Negative for weakness, numbness and headaches. Vitals:    04/13/21 0404   BP: 103/66   Pulse: 99   Resp: 24   Temp: 98.1 °F (36.7 °C)   SpO2: 100%   Weight: 54.4 kg (120 lb)   Height: 5' 1\" (1.549 m)            Physical Exam  Constitutional:       Appearance: Normal appearance. She is well-developed. HENT:      Head: Normocephalic and atraumatic.    Neck:      Musculoskeletal: Normal range of motion and neck supple. Cardiovascular:      Rate and Rhythm: Normal rate and regular rhythm. Pulmonary:      Effort: Pulmonary effort is normal.      Breath sounds: Normal breath sounds. Abdominal:      General: Bowel sounds are normal.      Palpations: Abdomen is soft. Tenderness: There is abdominal tenderness (Epigastric and LUQ). Musculoskeletal: Normal range of motion. General: No swelling. Skin:     General: Skin is warm and dry. Neurological:      Mental Status: She is alert and oriented to person, place, and time. MDM  Number of Diagnoses or Management Options  Diagnosis management comments: Patient with enlarged spleen from portal hypertension on CT. Possibly causing her left upper quadrant pain. Will discharge with pain medication and GI follow-up. Amount and/or Complexity of Data Reviewed  Clinical lab tests: ordered and reviewed  Tests in the radiology section of CPT®: ordered and reviewed  Tests in the medicine section of CPT®: ordered and reviewed    Patient Progress  Patient progress: stable         Procedures        CT ABD PELV W CONT (Final result)  Result time 04/13/21 70:21:51  Final result by Eva Vega MD (04/13/21 61:69:23)                Impression:    1. Cirrhotic liver, with a tiny subcapsular hematoma along the anterior margin   of the left lobe from a recent biopsy. 2. Progressed mild simple ascites in the abdomen and pelvis. This presumably   relates to portal hypertension given the enlarged spleen. 3. Previously seen heterogeneous enhancement of the left kidney has resolved,   with new heterogeneous enhancement of the right kidney. This is suspect for   pyelonephritis. 4. Mild constipation. Narrative:    EXAM: CT abdomen and pelvis with IV contrast.     INDICATION: Abdominal pain. Recent liver biopsy. COMPARISON: Prior CT abdomen and pelvis on March 15, 2021.      TECHNIQUE: Axial CT images of the abdomen and pelvis were obtained after the   intravenous injection of 100 mL Isovue 370 CT contrast. Oral contrast was   administered as well. Radiation dose reduction techniques were used for this   study.  Our CT scanners use one or all of the following:  Automated exposure   control, adjustment of the mA or kV according to patient size, iterative   reconstruction. FINDINGS:     - Liver: The liver again demonstrates a cirrhotic morphology. There is a new   thin linear density along the anterior margin of the left lower lobe, with an   overlying 1.8 x 0.6 cm subcapsular hematoma and stranding in the subcutaneous   fat, consistent with the recent biopsy. No active contrast extravasation is   identified. The portal vein is patent. - Gallbladder and bile ducts: Within normal limits. - Spleen: Unchanged splenomegaly. - Urinary tract: Previously seen heterogeneous contrast enhancement of the left   kidney has resolved. There is new milder heterogeneous enhancement of the right   kidney, suspect for pyelonephritis. No hydronephrosis is identified. The urinary   bladder is within normal limits. - Adrenals: Within normal limits. - Pancreas: Within normal limits. - Gastrointestinal tract: Unremarkable except for mild constipation. The   appendix is absent.   - Retroperitoneum: Within normal limits. - Peritoneal cavity and abdominal wall: There is progressed mild simple, water   density ascites in the abdomen and pelvis. No free intraperitoneal air is   identified.   - Pelvis: Within normal limits. - Spine/bones: No acute process. - Other comments:  The lung bases are clear.                            Results Include:    Recent Results (from the past 24 hour(s))   CBC WITH AUTOMATED DIFF    Collection Time: 04/13/21  4:07 AM   Result Value Ref Range    WBC 7.5 4.3 - 11.1 K/uL    RBC 3.43 (L) 4.05 - 5.2 M/uL    HGB 10.1 (L) 11.7 - 15.4 g/dL    HCT 31.2 (L) 35.8 - 46.3 %    MCV 91.0 79.6 - 97.8 FL    MCH 29.4 26.1 - 32.9 PG    MCHC 32.4 31.4 - 35.0 g/dL    RDW 13.6 11.9 - 14.6 %    PLATELET 626 (L) 097 - 450 K/uL    MPV 10.3 9.4 - 12.3 FL    ABSOLUTE NRBC 0.00 0.0 - 0.2 K/uL    DF AUTOMATED      NEUTROPHILS 73 43 - 78 %    LYMPHOCYTES 16 13 - 44 %    MONOCYTES 9 4.0 - 12.0 %    EOSINOPHILS 2 0.5 - 7.8 %    BASOPHILS 1 0.0 - 2.0 %    IMMATURE GRANULOCYTES 0 0.0 - 5.0 %    ABS. NEUTROPHILS 5.4 1.7 - 8.2 K/UL    ABS. LYMPHOCYTES 1.2 0.5 - 4.6 K/UL    ABS. MONOCYTES 0.7 0.1 - 1.3 K/UL    ABS. EOSINOPHILS 0.2 0.0 - 0.8 K/UL    ABS. BASOPHILS 0.0 0.0 - 0.2 K/UL    ABS. IMM. GRANS. 0.0 0.0 - 0.5 K/UL   METABOLIC PANEL, COMPREHENSIVE    Collection Time: 04/13/21  4:07 AM   Result Value Ref Range    Sodium 135 (L) 136 - 145 mmol/L    Potassium 4.1 3.5 - 5.1 mmol/L    Chloride 104 98 - 107 mmol/L    CO2 25 21 - 32 mmol/L    Anion gap 6 (L) 7 - 16 mmol/L    Glucose 104 (H) 65 - 100 mg/dL    BUN 11 6 - 23 MG/DL    Creatinine 0.80 0.6 - 1.0 MG/DL    GFR est AA >60 >60 ml/min/1.73m2    GFR est non-AA >60 >60 ml/min/1.73m2    Calcium 9.4 8.3 - 10.4 MG/DL    Bilirubin, total 1.6 (H) 0.2 - 1.1 MG/DL    ALT (SGPT) 77 (H) 12 - 65 U/L    AST (SGOT) 83 (H) 15 - 37 U/L    Alk. phosphatase 103 50 - 136 U/L    Protein, total 6.7 6.3 - 8.2 g/dL    Albumin 3.1 (L) 3.5 - 5.0 g/dL    Globulin 3.6 (H) 2.3 - 3.5 g/dL    A-G Ratio 0.9 (L) 1.2 - 3.5     LIPASE    Collection Time: 04/13/21  4:07 AM   Result Value Ref Range    Lipase 133 73 - 393 U/L     UA neg.

## 2021-04-16 ENCOUNTER — HOSPITAL ENCOUNTER (EMERGENCY)
Age: 41
Discharge: HOME | End: 2021-04-16
Attending: EMERGENCY MEDICINE
Payer: COMMERCIAL

## 2021-04-16 ENCOUNTER — APPOINTMENT (OUTPATIENT)
Dept: GENERAL RADIOLOGY | Age: 41
End: 2021-04-16
Attending: PHYSICIAN ASSISTANT
Payer: COMMERCIAL

## 2021-04-16 ENCOUNTER — APPOINTMENT (OUTPATIENT)
Dept: CT IMAGING | Age: 41
End: 2021-04-16
Attending: PHYSICIAN ASSISTANT
Payer: COMMERCIAL

## 2021-04-16 ENCOUNTER — APPOINTMENT (OUTPATIENT)
Dept: ULTRASOUND IMAGING | Age: 41
End: 2021-04-16
Attending: PHYSICIAN ASSISTANT
Payer: COMMERCIAL

## 2021-04-16 VITALS
HEIGHT: 61 IN | DIASTOLIC BLOOD PRESSURE: 64 MMHG | WEIGHT: 120 LBS | OXYGEN SATURATION: 99 % | SYSTOLIC BLOOD PRESSURE: 89 MMHG | BODY MASS INDEX: 22.66 KG/M2 | HEART RATE: 78 BPM | RESPIRATION RATE: 17 BRPM | TEMPERATURE: 97.6 F

## 2021-04-16 DIAGNOSIS — R10.12 ABDOMINAL PAIN, LUQ (LEFT UPPER QUADRANT): Primary | ICD-10-CM

## 2021-04-16 DIAGNOSIS — R82.71 BACTERIA IN URINE: ICD-10-CM

## 2021-04-16 LAB
ALBUMIN SERPL-MCNC: 3.5 G/DL (ref 3.5–5)
ALBUMIN/GLOB SERPL: 0.9 {RATIO} (ref 1.2–3.5)
ALP SERPL-CCNC: 110 U/L (ref 50–136)
ALT SERPL-CCNC: 87 U/L (ref 12–65)
ANION GAP SERPL CALC-SCNC: 7 MMOL/L (ref 7–16)
AST SERPL-CCNC: 93 U/L (ref 15–37)
BACTERIA URNS QL MICRO: ABNORMAL /HPF
BASOPHILS # BLD: 0 K/UL (ref 0–0.2)
BASOPHILS NFR BLD: 1 % (ref 0–2)
BILIRUB SERPL-MCNC: 1.4 MG/DL (ref 0.2–1.1)
BUN SERPL-MCNC: 11 MG/DL (ref 6–23)
CALCIUM SERPL-MCNC: 9.5 MG/DL (ref 8.3–10.4)
CASTS URNS QL MICRO: 0 /LPF
CHLORIDE SERPL-SCNC: 100 MMOL/L (ref 98–107)
CO2 SERPL-SCNC: 27 MMOL/L (ref 21–32)
CREAT SERPL-MCNC: 0.77 MG/DL (ref 0.6–1)
CRYSTALS URNS QL MICRO: 0 /LPF
DIFFERENTIAL METHOD BLD: ABNORMAL
EOSINOPHIL # BLD: 0.1 K/UL (ref 0–0.8)
EOSINOPHIL NFR BLD: 1 % (ref 0.5–7.8)
EPI CELLS #/AREA URNS HPF: ABNORMAL /HPF
ERYTHROCYTE [DISTWIDTH] IN BLOOD BY AUTOMATED COUNT: 13.8 % (ref 11.9–14.6)
GLOBULIN SER CALC-MCNC: 4.1 G/DL (ref 2.3–3.5)
GLUCOSE SERPL-MCNC: 109 MG/DL (ref 65–100)
HCT VFR BLD AUTO: 34 % (ref 35.8–46.3)
HGB BLD-MCNC: 11 G/DL (ref 11.7–15.4)
IMM GRANULOCYTES # BLD AUTO: 0 K/UL (ref 0–0.5)
IMM GRANULOCYTES NFR BLD AUTO: 0 % (ref 0–5)
INR PPP: 1.2
LACTATE SERPL-SCNC: 1.5 MMOL/L (ref 0.4–2)
LIPASE SERPL-CCNC: 104 U/L (ref 73–393)
LYMPHOCYTES # BLD: 1.2 K/UL (ref 0.5–4.6)
LYMPHOCYTES NFR BLD: 14 % (ref 13–44)
MCH RBC QN AUTO: 30.1 PG (ref 26.1–32.9)
MCHC RBC AUTO-ENTMCNC: 32.4 G/DL (ref 31.4–35)
MCV RBC AUTO: 92.9 FL (ref 79.6–97.8)
MONOCYTES # BLD: 0.7 K/UL (ref 0.1–1.3)
MONOCYTES NFR BLD: 8 % (ref 4–12)
MUCOUS THREADS URNS QL MICRO: ABNORMAL /LPF
NEUTS SEG # BLD: 6.6 K/UL (ref 1.7–8.2)
NEUTS SEG NFR BLD: 76 % (ref 43–78)
NRBC # BLD: 0 K/UL (ref 0–0.2)
OTHER OBSERVATIONS,UCOM: ABNORMAL
PLATELET # BLD AUTO: 171 K/UL (ref 150–450)
PMV BLD AUTO: 10.3 FL (ref 9.4–12.3)
POTASSIUM SERPL-SCNC: 3.7 MMOL/L (ref 3.5–5.1)
PROT SERPL-MCNC: 7.6 G/DL (ref 6.3–8.2)
PROTHROMBIN TIME: 15 SEC (ref 12.5–14.7)
RBC # BLD AUTO: 3.66 M/UL (ref 4.05–5.2)
RBC #/AREA URNS HPF: ABNORMAL /HPF
SODIUM SERPL-SCNC: 134 MMOL/L (ref 136–145)
WBC # BLD AUTO: 8.6 K/UL (ref 4.3–11.1)
WBC URNS QL MICRO: ABNORMAL /HPF

## 2021-04-16 PROCEDURE — 74018 RADEX ABDOMEN 1 VIEW: CPT

## 2021-04-16 PROCEDURE — 83690 ASSAY OF LIPASE: CPT

## 2021-04-16 PROCEDURE — 74011250637 HC RX REV CODE- 250/637: Performed by: PHYSICIAN ASSISTANT

## 2021-04-16 PROCEDURE — 80053 COMPREHEN METABOLIC PANEL: CPT

## 2021-04-16 PROCEDURE — 87086 URINE CULTURE/COLONY COUNT: CPT

## 2021-04-16 PROCEDURE — 83605 ASSAY OF LACTIC ACID: CPT

## 2021-04-16 PROCEDURE — 99284 EMERGENCY DEPT VISIT MOD MDM: CPT

## 2021-04-16 PROCEDURE — 85025 COMPLETE CBC W/AUTO DIFF WBC: CPT

## 2021-04-16 PROCEDURE — 96374 THER/PROPH/DIAG INJ IV PUSH: CPT

## 2021-04-16 PROCEDURE — 85610 PROTHROMBIN TIME: CPT

## 2021-04-16 PROCEDURE — 81015 MICROSCOPIC EXAM OF URINE: CPT

## 2021-04-16 PROCEDURE — 96375 TX/PRO/DX INJ NEW DRUG ADDON: CPT

## 2021-04-16 PROCEDURE — 81025 URINE PREGNANCY TEST: CPT

## 2021-04-16 PROCEDURE — 74011250636 HC RX REV CODE- 250/636: Performed by: PHYSICIAN ASSISTANT

## 2021-04-16 PROCEDURE — 76705 ECHO EXAM OF ABDOMEN: CPT

## 2021-04-16 PROCEDURE — 74176 CT ABD & PELVIS W/O CONTRAST: CPT

## 2021-04-16 RX ORDER — HYDROMORPHONE HYDROCHLORIDE 1 MG/ML
0.5 INJECTION, SOLUTION INTRAMUSCULAR; INTRAVENOUS; SUBCUTANEOUS
Status: COMPLETED | OUTPATIENT
Start: 2021-04-16 | End: 2021-04-16

## 2021-04-16 RX ORDER — SODIUM CHLORIDE 9 MG/ML
1000 INJECTION, SOLUTION INTRAVENOUS ONCE
Status: COMPLETED | OUTPATIENT
Start: 2021-04-16 | End: 2021-04-16

## 2021-04-16 RX ORDER — KETOROLAC TROMETHAMINE 30 MG/ML
30 INJECTION, SOLUTION INTRAMUSCULAR; INTRAVENOUS
Status: COMPLETED | OUTPATIENT
Start: 2021-04-16 | End: 2021-04-16

## 2021-04-16 RX ORDER — DICLOFENAC SODIUM 10 MG/G
2 GEL TOPICAL 4 TIMES DAILY
Qty: 350 G | Refills: 0 | Status: SHIPPED | OUTPATIENT
Start: 2021-04-16 | End: 2021-07-28

## 2021-04-16 RX ORDER — NITROFURANTOIN 25; 75 MG/1; MG/1
100 CAPSULE ORAL 2 TIMES DAILY
Qty: 20 CAP | Refills: 0 | Status: SHIPPED | OUTPATIENT
Start: 2021-04-16 | End: 2021-04-26

## 2021-04-16 RX ORDER — ONDANSETRON 2 MG/ML
4 INJECTION INTRAMUSCULAR; INTRAVENOUS
Status: COMPLETED | OUTPATIENT
Start: 2021-04-16 | End: 2021-04-16

## 2021-04-16 RX ORDER — ONDANSETRON 4 MG/1
4 TABLET, ORALLY DISINTEGRATING ORAL
Qty: 20 TAB | Refills: 0 | OUTPATIENT
Start: 2021-04-16 | End: 2021-06-26

## 2021-04-16 RX ORDER — DICYCLOMINE HYDROCHLORIDE 10 MG/1
10 CAPSULE ORAL
Qty: 40 CAP | Refills: 0 | Status: SHIPPED | OUTPATIENT
Start: 2021-04-16 | End: 2021-07-28

## 2021-04-16 RX ORDER — LIDOCAINE 50 MG/G
PATCH TOPICAL
Qty: 30 EACH | Refills: 0 | Status: SHIPPED | OUTPATIENT
Start: 2021-04-16

## 2021-04-16 RX ORDER — HYOSCYAMINE SULFATE 0.12 MG/1
0.25 TABLET SUBLINGUAL
Status: COMPLETED | OUTPATIENT
Start: 2021-04-16 | End: 2021-04-16

## 2021-04-16 RX ADMIN — HYOSCYAMINE SULFATE 0.25 MG: 0.12 TABLET ORAL; SUBLINGUAL at 12:43

## 2021-04-16 RX ADMIN — ONDANSETRON 4 MG: 2 INJECTION INTRAMUSCULAR; INTRAVENOUS at 09:38

## 2021-04-16 RX ADMIN — KETOROLAC TROMETHAMINE 30 MG: 30 INJECTION, SOLUTION INTRAMUSCULAR at 12:19

## 2021-04-16 RX ADMIN — SODIUM CHLORIDE 1000 ML: 900 INJECTION, SOLUTION INTRAVENOUS at 09:38

## 2021-04-16 RX ADMIN — HYDROMORPHONE HYDROCHLORIDE 0.5 MG: 1 INJECTION, SOLUTION INTRAMUSCULAR; INTRAVENOUS; SUBCUTANEOUS at 09:38

## 2021-04-16 NOTE — ED NOTES
I have reviewed discharge instructions with the patient. The patient verbalized understanding. Patient left ED via Discharge Method: ambulatory to Home with family. Opportunity for questions and clarification provided. Patient given 5 scripts. Pt in no acute distress at time of triage         To continue your aftercare when you leave the hospital, you may receive an automated call from our care team to check in on how you are doing. This is a free service and part of our promise to provide the best care and service to meet your aftercare needs.  If you have questions, or wish to unsubscribe from this service please call [phone]. Thank you for Choosing our [facility] Emergency Department.

## 2021-04-16 NOTE — ED PROVIDER NOTES
Patient is here with left upper quadrant pain that began on Monday, 4 days ago. She did come Monday night because of the intensity of the pain. She had a CT scan that showed some cirrhosis, splenomegaly, hematoma on the liver from a liver biopsy at interventional radiology on 2021 and some minimal ascites. Patient has a history of hepatitis B. She had had an EGD by gastroenterology  that showed 1. Esophageal varices without bleeding, unspecified esophageal varices type (HCC) (I85.00)  2. Portal hypertensive gastropathy (HCC) (K76.6, K31.89)  3. Gastritis without bleeding, unspecified chronicity, unspecified gastritis type (K29.70)    She states that she was sent home Monday evening with some oxycodone which seemed to help over the next few days until last evening at 7 PM when the pain began again. She did take one of the oxycodone without relief. She took another one at 3 AM without relief and states the pain is worse this morning. She has had no nausea or vomiting. She cannot remember the last time she had a bowel movement. No trouble with urination. No fever. Her mother is here with her. No chest pain, shortness of breath, lower abdominal pain, hematuria, swelling/tingling or weakness to arms or legs or other new symptoms. The history is provided by the patient and a parent. Past Medical History:   Diagnosis Date    Depression     medications     GERD (gastroesophageal reflux disease)     medications    Liver disease 2021    patient states that \"something is wrong with my liver\" but states has not been diagnosed.  Low iron        Past Surgical History:   Procedure Laterality Date    HX APPENDECTOMY      HX  SECTION      HX DILATION AND CURETTAGE      HX TONSILLECTOMY           No family history on file.     Social History     Socioeconomic History    Marital status:      Spouse name: Not on file    Number of children: Not on file  Years of education: Not on file    Highest education level: Not on file   Occupational History    Not on file   Social Needs    Financial resource strain: Not on file    Food insecurity     Worry: Not on file     Inability: Not on file    Transportation needs     Medical: Not on file     Non-medical: Not on file   Tobacco Use    Smoking status: Never Smoker    Smokeless tobacco: Never Used   Substance and Sexual Activity    Alcohol use: Not Currently    Drug use: Not Currently    Sexual activity: Not on file   Lifestyle    Physical activity     Days per week: Not on file     Minutes per session: Not on file    Stress: Not on file   Relationships    Social connections     Talks on phone: Not on file     Gets together: Not on file     Attends Anabaptism service: Not on file     Active member of club or organization: Not on file     Attends meetings of clubs or organizations: Not on file     Relationship status: Not on file    Intimate partner violence     Fear of current or ex partner: Not on file     Emotionally abused: Not on file     Physically abused: Not on file     Forced sexual activity: Not on file   Other Topics Concern    Not on file   Social History Narrative    Not on file         ALLERGIES: Sulfa (sulfonamide antibiotics)    Review of Systems   Constitutional: Negative. HENT: Negative. Eyes: Negative. Respiratory: Negative. Cardiovascular: Negative. Gastrointestinal: Positive for abdominal pain. Genitourinary: Negative. Musculoskeletal: Negative. Skin: Negative. Neurological: Negative. Psychiatric/Behavioral: Negative. All other systems reviewed and are negative. Vitals:    04/16/21 0913 04/16/21 0925 04/16/21 0925   BP: (!) 91/56 119/65    Pulse: (!) 110 (!) 105    Resp: 17     Temp: 97.6 °F (36.4 °C)     SpO2: 96% 99% 98%   Weight: 54.4 kg (120 lb)     Height: 5' 1\" (1.549 m)              Physical Exam  Vitals signs and nursing note reviewed. Constitutional:       Appearance: She is well-developed. HENT:      Head: Normocephalic and atraumatic. Right Ear: External ear normal.      Left Ear: External ear normal.      Nose: Nose normal.   Eyes:      Conjunctiva/sclera: Conjunctivae normal.      Pupils: Pupils are equal, round, and reactive to light. Neck:      Musculoskeletal: Normal range of motion and neck supple. Cardiovascular:      Rate and Rhythm: Normal rate and regular rhythm. Heart sounds: Normal heart sounds. Pulmonary:      Effort: Pulmonary effort is normal.      Breath sounds: Normal breath sounds. Abdominal:      General: Bowel sounds are normal. There is no distension. Palpations: Abdomen is soft. There is no mass. Tenderness: There is abdominal tenderness. There is no right CVA tenderness, left CVA tenderness, guarding or rebound. Hernia: No hernia is present. Musculoskeletal: Normal range of motion. Skin:     General: Skin is warm and dry. Capillary Refill: Capillary refill takes less than 2 seconds. Neurological:      General: No focal deficit present. Mental Status: She is alert and oriented to person, place, and time. Deep Tendon Reflexes: Reflexes are normal and symmetric. Psychiatric:         Mood and Affect: Mood normal.         Behavior: Behavior normal.         Thought Content: Thought content normal.         Judgment: Judgment normal.          MDM  Number of Diagnoses or Management Options     Amount and/or Complexity of Data Reviewed  Clinical lab tests: ordered  Tests in the radiology section of CPT®: ordered  Discuss the patient with other providers: yes (Dr. Seretha Lesch)    Risk of Complications, Morbidity, and/or Mortality  Presenting problems: moderate  Diagnostic procedures: moderate  Management options: moderate           Procedures    9:39 AM spoke with Dr. Seretha Lesch regarding patient. We discussed the history, physical exam and work-up.   He would like an ultrasound and a PT/INR at this time. No KUB at this time. He reviewed the CT scan from Monday. 11:49 AM Dr. Marcelo Fermin went to examine and see the patient. He states there is no expanding hematoma on ultrasound today. He states the pleural effusion is small and is consistent with cirrhosis. He would like to send her home on a nonsteroidal to see if that would help. He feels no x-ray or CT at this time is indicated. 12:12 PM Spoke again with Dr. Marcelo Fermin regarding patient. I will consult GI. Patient is now having pain again. 12:17 PM Perfect served GI now for them to call me. 12:24 PM Spoke with Solo Samson NP with GI, she will come look at patient and will get a KUB.     1:24 PM Perfect serve from Solo Samson NP, she put note in chart. Still awaiting KUB. States Dr. Familia Pastrana will see after procedures. 4:17 PM Dr. Familia Pastrana saw the patient and feels like this is musculoskeletal.  She wanted her to have lidocaine topically, NSAIDs topically and follow-up with them. Patient is feeling much better. Her mother is driving her home. The Toradol IV really helped relieve the pain completely. Patient will be sent home with Macrobid for the bacteria, white cells and red cells in her urine. She is not on her period. We have sent her urine for culture and will call her if we do need to change her antibiotics. She was instructed to drink plenty of fluids and follow-up with GI for recheck. Return to the ED sooner if worsening in any way. She is stable for discharge and ambulatory out of the ER without difficulty at this time. The patient was observed in the ED.     Results Reviewed:      Recent Results (from the past 24 hour(s))   CBC WITH AUTOMATED DIFF    Collection Time: 04/16/21  9:28 AM   Result Value Ref Range    WBC 8.6 4.3 - 11.1 K/uL    RBC 3.66 (L) 4.05 - 5.2 M/uL    HGB 11.0 (L) 11.7 - 15.4 g/dL    HCT 34.0 (L) 35.8 - 46.3 %    MCV 92.9 79.6 - 97.8 FL    MCH 30.1 26.1 - 32.9 PG    MCHC 32.4 31.4 - 35.0 g/dL RDW 13.8 11.9 - 14.6 %    PLATELET 823 271 - 414 K/uL    MPV 10.3 9.4 - 12.3 FL    ABSOLUTE NRBC 0.00 0.0 - 0.2 K/uL    DF AUTOMATED      NEUTROPHILS 76 43 - 78 %    LYMPHOCYTES 14 13 - 44 %    MONOCYTES 8 4.0 - 12.0 %    EOSINOPHILS 1 0.5 - 7.8 %    BASOPHILS 1 0.0 - 2.0 %    IMMATURE GRANULOCYTES 0 0.0 - 5.0 %    ABS. NEUTROPHILS 6.6 1.7 - 8.2 K/UL    ABS. LYMPHOCYTES 1.2 0.5 - 4.6 K/UL    ABS. MONOCYTES 0.7 0.1 - 1.3 K/UL    ABS. EOSINOPHILS 0.1 0.0 - 0.8 K/UL    ABS. BASOPHILS 0.0 0.0 - 0.2 K/UL    ABS. IMM. GRANS. 0.0 0.0 - 0.5 K/UL   METABOLIC PANEL, COMPREHENSIVE    Collection Time: 04/16/21  9:28 AM   Result Value Ref Range    Sodium 134 (L) 136 - 145 mmol/L    Potassium 3.7 3.5 - 5.1 mmol/L    Chloride 100 98 - 107 mmol/L    CO2 27 21 - 32 mmol/L    Anion gap 7 7 - 16 mmol/L    Glucose 109 (H) 65 - 100 mg/dL    BUN 11 6 - 23 MG/DL    Creatinine 0.77 0.6 - 1.0 MG/DL    GFR est AA >60 >60 ml/min/1.73m2    GFR est non-AA >60 >60 ml/min/1.73m2    Calcium 9.5 8.3 - 10.4 MG/DL    Bilirubin, total 1.4 (H) 0.2 - 1.1 MG/DL    ALT (SGPT) 87 (H) 12 - 65 U/L    AST (SGOT) 93 (H) 15 - 37 U/L    Alk.  phosphatase 110 50 - 136 U/L    Protein, total 7.6 6.3 - 8.2 g/dL    Albumin 3.5 3.5 - 5.0 g/dL    Globulin 4.1 (H) 2.3 - 3.5 g/dL    A-G Ratio 0.9 (L) 1.2 - 3.5     LIPASE    Collection Time: 04/16/21  9:28 AM   Result Value Ref Range    Lipase 104 73 - 393 U/L   URINE MICROSCOPIC    Collection Time: 04/16/21  9:33 AM   Result Value Ref Range    WBC 5-10 0 /hpf    RBC 20-50 0 /hpf    Epithelial cells 5-10 0 /hpf    Bacteria 1+ (H) 0 /hpf    Casts 0 0 /lpf    Crystals, urine 0 0 /LPF    Mucus 2+ (H) 0 /lpf    Other observations RESULTS VERIFIED MANUALLY     LACTIC ACID    Collection Time: 04/16/21  9:42 AM   Result Value Ref Range    Lactic acid 1.5 0.4 - 2.0 MMOL/L   PROTHROMBIN TIME + INR    Collection Time: 04/16/21  9:43 AM   Result Value Ref Range    Prothrombin time 15.0 (H) 12.5 - 14.7 sec    INR 1.2       CT ABD/PEL FOR RENAL STONE   Final Result   1. No renal or ureteral stones. There is no hydronephrosis. 2. Cirrhosis with trace ascites. 3. The spleen is upper limits of normal in size suggesting a component of portal   venous hypertension. XR ABD (KUB)   Final Result   Persistent contrast within the colon. Otherwise no abnormality   noted. US ABD LTD   Final Result      1. Right pleural effusion. 2. Cirrhotic liver morphology. I discussed the results of all labs, procedures, radiographs, and treatments with the patient and available family. Treatment plan is agreed upon and the patient is ready for discharge. All voiced understanding of the discharge plan and medication instructions or changes as appropriate. Questions about treatment in the ED were answered. All were encouraged to return should symptoms worsen or new problems develop.

## 2021-04-16 NOTE — DISCHARGE INSTRUCTIONS
Finish all of the antibiotics, drink plenty of water, use warm moist heat to the abdomen, follow-up with the gastroenterologist for recheck. Use the Bentyl as directed if needed for pain. Return to the ED if worsening in any way.

## 2021-04-16 NOTE — ED TRIAGE NOTES
Pt arrives pov c/o left sided worsening abd pain. states was seen here few days ago for the same thing. Hx of cirrhosis and maybe enlarged spleen.  Denies dysuria, d/n/v.

## 2021-04-16 NOTE — Clinical Note
[address] [facility] EMERGENCY DEPT   [facility] [address] [phone]    Work/School Note    Date: 4/16/2021    To Whom It May concern:    Husam Christopher was seen and treated today in the emergency room by the following provider(s):  Attending Provider: Jarrod Hawk MD  Physician Assistant: Kei Bravo is excused from work/school on 4/16/2021 through 4/19/2021. She is medically clear to return to work/school on 4/20/2021.         Sincerely,          LOREN Pollard

## 2021-04-16 NOTE — CONSULTS
[facility] Consult Note       Primary GI Physician: Dr Samm Frances    Referring Provider:  Dr Mariama Johnson Date:  4/16/2021    Admit Date:  4/16/2021    Chief Complaint:  LUQ pain    Subjective:     History of Present Illness:  Patient is a 36 y.o. female with PMH of including but not limited to cirrhosis, GERD, depression, who is seen in consultation at the request of Dr Burgess Pinzon for LUQ pain. Pt seen in ED 4/13/2021 for epigastric and LUQ pain. CT scan 4/13/2021 with enlarged spleen from portal HTN, cirrhosis, tiny subcapsular hematomoa along anterior margin of L liver lobe, mild simple ascites, R kidney with heterogenous enhancement suspect for pyelonephritis. She was discharged with pain medications and advised to follow up with GI. She had worsening pain today. No improvement with oxycodone so returned to ED. RUQ ultrasound 4/16/2021 with cirrhosis and R pleural effusion. HGB stable at 10.0, UA with 1+ bacteria, LFTS elevated but similar to labs 4/13/2021. T bili 1.4 (was 1.6 on 4/13/2021). Lactic acid 1.5. KUB ordered and pending. Pt reports pain is L sided, very focal, just lateral of umbilicus. Pain is stabbing in nature. Worsened with movement or position changes. Better with rest. No change with eating, food choices, bowel movement. Deies any recent trauma to the area or strenuous activity. She cannot recall when last BM was. She does have constipation and takes Mg pill at home for this. No nausea or vomiting. Pt known to Dr Samm Frances. Liver bx 4/8/2021 with Dr Lc Chicas. Bx with findings c/w cirrhosis. EGD 4/9/2021 with single grade 1 esophageal varix without bleeding. Mild gastritis in antrum and distal body with few erosions. Gastric bx with changes of repair. Repeat EGD 1 year at hospital for surveillance of varices.      PMH:  Past Medical History:   Diagnosis Date    Depression     medications     GERD (gastroesophageal reflux disease)     medications    Liver disease 03/2021 patient states that \"something is wrong with my liver\" but states has not been diagnosed.  Low iron        PSH:  Past Surgical History:   Procedure Laterality Date    HX APPENDECTOMY      HX  SECTION      HX DILATION AND CURETTAGE      HX TONSILLECTOMY         Allergies: Allergies   Allergen Reactions    Sulfa (Sulfonamide Antibiotics) Rash       Home Medications:  Prior to Admission medications    Medication Sig Start Date End Date Taking? Authorizing Provider   oxyCODONE IR (Roxicodone) 5 mg immediate release tablet Take 1 Tab by mouth every six (6) hours as needed for Pain for up to 3 days. Max Daily Amount: 20 mg. 21  Yumiko Vinson III, MD   cetirizine (ZYRTEC) 10 mg tablet Take 10 mg by mouth daily. 20   Provider, Historical   FLUoxetine (PROzac) 40 mg capsule Take 80 mg by mouth daily. 20   Provider, Historical   magnesium oxide (MAG-OX) 400 mg tablet Take  by mouth daily. Provider, Historical   therapeutic multivitamin (THERAGRAN) tablet Take 1 Tab by mouth daily. Provider, Historical   valACYclovir (VALTREX) 1 gram tablet Take 1,000 mg by mouth daily. 20   Provider, Historical   pantoprazole (PROTONIX) 20 mg tablet Take 20 mg by mouth daily. Provider, Historical       Hospital Medications:  Current Facility-Administered Medications   Medication Dose Route Frequency    hyoscyamine SL (LEVSIN/SL) tablet 0.25 mg  0.25 mg SubLINGual NOW     Current Outpatient Medications   Medication Sig    oxyCODONE IR (Roxicodone) 5 mg immediate release tablet Take 1 Tab by mouth every six (6) hours as needed for Pain for up to 3 days. Max Daily Amount: 20 mg.    cetirizine (ZYRTEC) 10 mg tablet Take 10 mg by mouth daily.  FLUoxetine (PROzac) 40 mg capsule Take 80 mg by mouth daily.  magnesium oxide (MAG-OX) 400 mg tablet Take  by mouth daily.  therapeutic multivitamin (THERAGRAN) tablet Take 1 Tab by mouth daily.     valACYclovir (VALTREX) 1 gram tablet Take 1,000 mg by mouth daily.  pantoprazole (PROTONIX) 20 mg tablet Take 20 mg by mouth daily. Social History:  Social History     Tobacco Use    Smoking status: Never Smoker    Smokeless tobacco: Never Used   Substance Use Topics    Alcohol use: Not Currently       Pt denies any history of drug use, blood transfusions, or tattoos. Family History:  No family history on file. Review of Systems:  A detailed 10 system ROS is obtained, with pertinent positives as listed above. All others are negative. Diet:  NPO    Objective:     Physical Exam:  Vitals:  Visit Vitals  BP (!) 101/56   Pulse 81   Temp 97.6 °F (36.4 °C)   Resp 17   Ht 5' 1\" (1.549 m)   Wt 54.4 kg (120 lb)   LMP 03/26/2021   SpO2 98%   BMI 22.67 kg/m²     Gen:  Pt is alert, cooperative, no acute distress  Skin:  Extremities and face reveal no rashes. HEENT: Sclerae anicteric. Extra-occular muscles are intact. No oral ulcers. No abnormal pigmentation of the lips. The neck is supple. Cardiovascular: Regular rate and rhythm. No murmurs, gallops, or rubs. Respiratory:  Comfortable breathing with no accessory muscle use. Clear breath sounds anteriorly with no wheezes, rales, or rhonchi. GI:  Abdomen nondistended, soft. Normal active bowel sounds. No enlargement of the liver or spleen. No masses palpable. *Focal area of pain just L periumbilical area. Positive Carnett's sign with sit up and with straight leg raise*   Rectal:  Deferred  Musculoskeletal:  No pitting edema of the lower legs. Neurological:  Gross memory appears intact. Patient is alert and oriented. Psychiatric:  Mood appears appropriate with judgement intact. Lymphatic:  No cervical or supraclavicular adenopathy.     Laboratory:    Recent Labs     04/16/21  0943 04/16/21  0928   WBC  --  8.6   HGB  --  11.0*   HCT  --  34.0*   PLT  --  171   MCV  --  92.9   NA  --  134*   K  --  3.7   CL  --  100   CO2  --  27   BUN  --  11   CREA  --  0.77   CA  -- 9.5   GLU  --  109*   AP  --  110   ALT  --  87*   TBILI  --  1.4*   ALB  --  3.5   TP  --  7.6   LPSE  --  104   PTP 15.0*  --    INR 1.2  --       EGD 4/9/2021   Esophagogastroduodenoscopy    DATE of PROCEDURE: 4/9/2021     INDICATION:  1. Screening for esophageal varices     POSTPROCEDURE DIAGNOSIS:  1. Esophageal varix  2. Portal hypertensive gastropathy  3. Gastritis     MEDICATIONS ADMINISTERED: MAC. Further details per anesthesia note.     INSTRUMENT: [id number]     PROCEDURE:  After obtaining informed consent, the patient was placed in the left lateral position and sedated. The endoscope was advanced under direct vision without difficulty. The esophagus, stomach (including retroflexed views) and duodenum were evaluated. The patient was taken to the recovery area in stable condition.     FINDINGS:  ESOPHAGUS: Single grade I varix without stigmata of bleeding. Otherwise normal exam.  STOMACH: Negative for gastric varices. Mild PHG changes in proximal body and fundus. Mild gastritis in antrum and distal body with a few erosions. Cold-forceps biopsies for pathology. Otherwise normal exam.  DUODENUM: Normal     Estimated blood loss: 0-minimal   Specimens obtained during procedure:   1. Gastric biopsies     PLAN:  1. Follow-up pathology  2. Avoid NSAID use  3. Continue pantoprazole 20 mg PO daily  4. Repeat EGD in 1 year at hospital for surveillance of varix  5. Follow-up in office in 8 weeks     Van Perez MD      CT 4/13/2021     FINDINGS:     - Liver: The liver again demonstrates a cirrhotic morphology. There is a new  thin linear density along the anterior margin of the left lower lobe, with an  overlying 1.8 x 0.6 cm subcapsular hematoma and stranding in the subcutaneous  fat, consistent with the recent biopsy. No active contrast extravasation is  identified. The portal vein is patent. - Gallbladder and bile ducts: Within normal limits. - Spleen: Unchanged splenomegaly.   - Urinary tract: Previously seen heterogeneous contrast enhancement of the left  kidney has resolved. There is new milder heterogeneous enhancement of the right  kidney, suspect for pyelonephritis. No hydronephrosis is identified. The urinary  bladder is within normal limits. - Adrenals: Within normal limits. - Pancreas: Within normal limits. - Gastrointestinal tract: Unremarkable except for mild constipation. The  appendix is absent.  - Retroperitoneum: Within normal limits. - Peritoneal cavity and abdominal wall: There is progressed mild simple, water  density ascites in the abdomen and pelvis. No free intraperitoneal air is  identified.  - Pelvis: Within normal limits. - Spine/bones: No acute process. - Other comments: The lung bases are clear.     IMPRESSION  1. Cirrhotic liver, with a tiny subcapsular hematoma along the anterior margin  of the left lobe from a recent biopsy. 2. Progressed mild simple ascites in the abdomen and pelvis. This presumably  relates to portal hypertension given the enlarged spleen. 3. Previously seen heterogeneous enhancement of the left kidney has resolved,  with new heterogeneous enhancement of the right kidney. This is suspect for  pyelonephritis. 4. Mild constipation.       RUQ ultrasound 4/16/2021   FINDINGS:      The pancreatic head is normal in appearance       The gallbladder is well-distended without shadowing stones, wall thickening or  pericholecystic fluid. The sonographic Henry's sign is absent. The common  bile duct is 7.8 mm in diameter.     The liver is a cirrhotic morphology with a nodular contour. There are no focal  hepatic lesions and there is no visible perihepatic hematoma. Hepatopedal flow  is present within a patent main portal vein.     Note is made of a right pleural effusion.     The spleen is 13.4 cm in length.     The proximal aorta is 1.cm in diameter. The IVC is patent.        IMPRESSION     1. Right pleural effusion.     2.  Cirrhotic liver morphology.       Assessment:     Active Problems:    * No active hospital problems. *  Patient is a 36 y.o. female with PMH of including but not limited to cirrhosis, GERD, depression, who is seen in consultation at the request of Dr Nicanor Champagne for LUQ pain. Pt seen in ED 4/13/2021 for epigastric and LUQ pain. CT scan 4/13/2021 with enlarged spleen from portal HTN, cirrhosis, tiny subcapsular hematomoa along anterior margin of L liver lobe, mild simple ascites, R kidney with heterogenous enhancement suspect for pyelonephritis. She was discharged with pain medications and advised to follow up with GI. She had worsening pain today. No improvement with oxycodone so returned to ED. RUQ ultrasound 4/16/2021 with cirrhosis and R pleural effusion. HGB stable at 10.0, UA with 1+ bacteria, LFTS elevated but similar to labs 4/13/2021. T bili 1.4 (was 1.6 on 4/13/2021). Lactic acid 1.5. KUB ordered and pending. Pain very focal and worsened with movement. Positive Carnett's sign. Likely abdominal wall/musculoskeletal pain. Plan:     -KUB pending  -Recommend symptomatic treatment-heating pad, topical muscle rub like Biofreeze, topical lidocaine, etc, analgesics (avoid NSAIDs)  -Follow up with Dr Cosme North as outpatient      SHELLIE Cisse    Patient is seen and examined in collaboration with Dr. Stanislaw Orantes. Assessment and plan as per Dr. Stanislaw Orantes.

## 2021-04-18 LAB
BACTERIA SPEC CULT: NORMAL
SERVICE CMNT-IMP: NORMAL

## 2021-04-22 LAB — HCG UR QL: NEGATIVE

## 2021-06-22 ENCOUNTER — TRANSCRIBE ORDER (OUTPATIENT)
Dept: SCHEDULING | Age: 41
End: 2021-06-22

## 2021-06-22 DIAGNOSIS — K74.69 CRYPTOGENIC CIRRHOSIS (HCC): Primary | ICD-10-CM

## 2021-06-22 DIAGNOSIS — M54.50 LUMBAR PAIN: Primary | ICD-10-CM

## 2021-06-22 DIAGNOSIS — M54.50 PAIN, LUMBAR REGION: Primary | ICD-10-CM

## 2021-06-22 DIAGNOSIS — R10.816 EPIGASTRIC ABDOMINAL TENDERNESS: ICD-10-CM

## 2021-06-22 DIAGNOSIS — R11.14 BILIOUS VOMITING: ICD-10-CM

## 2021-06-26 ENCOUNTER — HOSPITAL ENCOUNTER (EMERGENCY)
Age: 41
Discharge: HOME | End: 2021-06-26
Attending: STUDENT IN AN ORGANIZED HEALTH CARE EDUCATION/TRAINING PROGRAM
Payer: COMMERCIAL

## 2021-06-26 ENCOUNTER — APPOINTMENT (OUTPATIENT)
Dept: CT IMAGING | Age: 41
End: 2021-06-26
Attending: STUDENT IN AN ORGANIZED HEALTH CARE EDUCATION/TRAINING PROGRAM
Payer: COMMERCIAL

## 2021-06-26 VITALS
DIASTOLIC BLOOD PRESSURE: 61 MMHG | HEIGHT: 61 IN | TEMPERATURE: 98 F | RESPIRATION RATE: 16 BRPM | HEART RATE: 96 BPM | WEIGHT: 117 LBS | OXYGEN SATURATION: 100 % | SYSTOLIC BLOOD PRESSURE: 100 MMHG | BODY MASS INDEX: 22.09 KG/M2

## 2021-06-26 DIAGNOSIS — M54.50 ACUTE MIDLINE LOW BACK PAIN WITHOUT SCIATICA: Primary | ICD-10-CM

## 2021-06-26 LAB
ALBUMIN SERPL-MCNC: 3.3 G/DL (ref 3.5–5)
ALBUMIN/GLOB SERPL: 0.9 {RATIO} (ref 1.2–3.5)
ALP SERPL-CCNC: 149 U/L (ref 50–130)
ALT SERPL-CCNC: 116 U/L (ref 12–65)
ANION GAP SERPL CALC-SCNC: 10 MMOL/L (ref 7–16)
APPEARANCE UR: CLEAR
AST SERPL-CCNC: 165 U/L (ref 15–37)
BACTERIA URNS QL MICRO: 0 /HPF
BASOPHILS # BLD: 0 K/UL (ref 0–0.2)
BASOPHILS NFR BLD: 1 % (ref 0–2)
BILIRUB SERPL-MCNC: 2 MG/DL (ref 0.2–1.1)
BILIRUB UR QL: ABNORMAL
BUN SERPL-MCNC: 18 MG/DL (ref 6–23)
CALCIUM SERPL-MCNC: 8.6 MG/DL (ref 8.3–10.4)
CASTS URNS QL MICRO: ABNORMAL /LPF
CHLORIDE SERPL-SCNC: 101 MMOL/L (ref 98–107)
CO2 SERPL-SCNC: 22 MMOL/L (ref 21–32)
COLOR UR: ABNORMAL
CREAT SERPL-MCNC: 0.83 MG/DL (ref 0.6–1)
DIFFERENTIAL METHOD BLD: ABNORMAL
EOSINOPHIL # BLD: 0.1 K/UL (ref 0–0.8)
EOSINOPHIL NFR BLD: 1 % (ref 0.5–7.8)
EPI CELLS #/AREA URNS HPF: ABNORMAL /HPF
ERYTHROCYTE [DISTWIDTH] IN BLOOD BY AUTOMATED COUNT: 16.8 % (ref 11.9–14.6)
GLOBULIN SER CALC-MCNC: 3.6 G/DL (ref 2.3–3.5)
GLUCOSE SERPL-MCNC: 104 MG/DL (ref 65–100)
GLUCOSE UR STRIP.AUTO-MCNC: NEGATIVE MG/DL
HCT VFR BLD AUTO: 31.5 % (ref 35.8–46.3)
HGB BLD-MCNC: 10.3 G/DL (ref 11.7–15.4)
HGB UR QL STRIP: NEGATIVE
IMM GRANULOCYTES # BLD AUTO: 0 K/UL (ref 0–0.5)
IMM GRANULOCYTES NFR BLD AUTO: 0 % (ref 0–5)
KETONES UR QL STRIP.AUTO: ABNORMAL MG/DL
LEUKOCYTE ESTERASE UR QL STRIP.AUTO: ABNORMAL
LIPASE SERPL-CCNC: 193 U/L (ref 73–393)
LYMPHOCYTES # BLD: 1 K/UL (ref 0.5–4.6)
LYMPHOCYTES NFR BLD: 12 % (ref 13–44)
MCH RBC QN AUTO: 28.9 PG (ref 26.1–32.9)
MCHC RBC AUTO-ENTMCNC: 32.7 G/DL (ref 31.4–35)
MCV RBC AUTO: 88.2 FL (ref 79.6–97.8)
MONOCYTES # BLD: 0.6 K/UL (ref 0.1–1.3)
MONOCYTES NFR BLD: 7 % (ref 4–12)
NEUTS SEG # BLD: 6.7 K/UL (ref 1.7–8.2)
NEUTS SEG NFR BLD: 80 % (ref 43–78)
NITRITE UR QL STRIP.AUTO: NEGATIVE
NRBC # BLD: 0 K/UL (ref 0–0.2)
PH UR STRIP: 6.5 [PH] (ref 5–9)
PLATELET # BLD AUTO: 214 K/UL (ref 150–450)
PMV BLD AUTO: 10.9 FL (ref 9.4–12.3)
POTASSIUM SERPL-SCNC: 3.5 MMOL/L (ref 3.5–5.1)
PROT SERPL-MCNC: 6.9 G/DL (ref 6.3–8.2)
PROT UR STRIP-MCNC: NEGATIVE MG/DL
RBC # BLD AUTO: 3.57 M/UL (ref 4.05–5.2)
RBC #/AREA URNS HPF: ABNORMAL /HPF
SODIUM SERPL-SCNC: 133 MMOL/L (ref 136–145)
SP GR UR REFRACTOMETRY: 1.02 (ref 1–1.02)
UROBILINOGEN UR QL STRIP.AUTO: 1 EU/DL (ref 0.2–1)
WBC # BLD AUTO: 8.4 K/UL (ref 4.3–11.1)
WBC URNS QL MICRO: ABNORMAL /HPF

## 2021-06-26 PROCEDURE — 74011000258 HC RX REV CODE- 258: Performed by: STUDENT IN AN ORGANIZED HEALTH CARE EDUCATION/TRAINING PROGRAM

## 2021-06-26 PROCEDURE — 96375 TX/PRO/DX INJ NEW DRUG ADDON: CPT

## 2021-06-26 PROCEDURE — 99284 EMERGENCY DEPT VISIT MOD MDM: CPT

## 2021-06-26 PROCEDURE — 74011250636 HC RX REV CODE- 250/636: Performed by: STUDENT IN AN ORGANIZED HEALTH CARE EDUCATION/TRAINING PROGRAM

## 2021-06-26 PROCEDURE — 74011000636 HC RX REV CODE- 636: Performed by: STUDENT IN AN ORGANIZED HEALTH CARE EDUCATION/TRAINING PROGRAM

## 2021-06-26 PROCEDURE — 83690 ASSAY OF LIPASE: CPT

## 2021-06-26 PROCEDURE — 80053 COMPREHEN METABOLIC PANEL: CPT

## 2021-06-26 PROCEDURE — 96374 THER/PROPH/DIAG INJ IV PUSH: CPT

## 2021-06-26 PROCEDURE — 74177 CT ABD & PELVIS W/CONTRAST: CPT

## 2021-06-26 PROCEDURE — 85025 COMPLETE CBC W/AUTO DIFF WBC: CPT

## 2021-06-26 PROCEDURE — 81001 URINALYSIS AUTO W/SCOPE: CPT

## 2021-06-26 RX ORDER — OXYCODONE HYDROCHLORIDE 5 MG/1
5 TABLET ORAL
Qty: 8 TABLET | Refills: 0 | Status: SHIPPED | OUTPATIENT
Start: 2021-06-26 | End: 2021-06-29

## 2021-06-26 RX ORDER — PREDNISONE 20 MG/1
40 TABLET ORAL DAILY
Qty: 8 TABLET | Refills: 0 | Status: SHIPPED | OUTPATIENT
Start: 2021-06-26 | End: 2021-06-30

## 2021-06-26 RX ORDER — HYDROMORPHONE HYDROCHLORIDE 1 MG/ML
0.5 INJECTION, SOLUTION INTRAMUSCULAR; INTRAVENOUS; SUBCUTANEOUS
Status: COMPLETED | OUTPATIENT
Start: 2021-06-26 | End: 2021-06-26

## 2021-06-26 RX ORDER — SODIUM CHLORIDE 0.9 % (FLUSH) 0.9 %
10 SYRINGE (ML) INJECTION
Status: COMPLETED | OUTPATIENT
Start: 2021-06-26 | End: 2021-06-26

## 2021-06-26 RX ORDER — ONDANSETRON 4 MG/1
4 TABLET, ORALLY DISINTEGRATING ORAL
Qty: 8 TABLET | Refills: 2 | Status: SHIPPED | OUTPATIENT
Start: 2021-06-26

## 2021-06-26 RX ORDER — ONDANSETRON 2 MG/ML
4 INJECTION INTRAMUSCULAR; INTRAVENOUS
Status: COMPLETED | OUTPATIENT
Start: 2021-06-26 | End: 2021-06-26

## 2021-06-26 RX ADMIN — HYDROMORPHONE HYDROCHLORIDE 0.5 MG: 1 INJECTION, SOLUTION INTRAMUSCULAR; INTRAVENOUS; SUBCUTANEOUS at 14:24

## 2021-06-26 RX ADMIN — ONDANSETRON 4 MG: 2 INJECTION INTRAMUSCULAR; INTRAVENOUS at 14:24

## 2021-06-26 RX ADMIN — IOPAMIDOL 100 ML: 755 INJECTION, SOLUTION INTRAVENOUS at 15:55

## 2021-06-26 RX ADMIN — SODIUM CHLORIDE 100 ML: 900 INJECTION, SOLUTION INTRAVENOUS at 15:56

## 2021-06-26 RX ADMIN — Medication 10 ML: at 15:56

## 2021-06-26 RX ADMIN — DIATRIZOATE MEGLUMINE AND DIATRIZOATE SODIUM 15 ML: 660; 100 LIQUID ORAL; RECTAL at 14:24

## 2021-06-26 NOTE — DISCHARGE INSTRUCTIONS
Take the medication for pain prescribed as directed. Arrange follow-up with your primary care provider and your GI specialist as discussed. Treat plenty of clear liquids to ensure hydration. Return for worsening symptoms, concerns or questions.

## 2021-06-26 NOTE — ED TRIAGE NOTES
Pt states she has stage 4 cirrhosis and has had severe back pain, nausea and vomiting. Pt states she attempted tylenol.     Elijah Diaz RN

## 2021-06-26 NOTE — ED PROVIDER NOTES
59-year-old female patient with history of [employer] resulting in liver cirrhosis presents to the emergency department with severe low back pain that started 4 days ago. She denies injury falls or trauma. Pain is isolated to the lumbar spine and does not radiate. She denies obvious alleviating or exacerbating factors but does report some intermittent nature of her discomfort. She denies any radiation down the legs and reports no changes in bowel or bladder habits. She is prescribed iron for ongoing anemia which is turned her stool black but denies any bloody appearing stool or pain with bowel movements. She is urinating normally without changes in the appearance or smell of the urine. There is no associated fever. Patient states she has very limited options for treatment of pain at home. She was advised not to use NSAIDs and shows to take a dose of Tylenol for her discomfort without effect. The history is provided by the patient. No  was used. Back Pain   This is a new problem. Pertinent negatives include no chest pain, no fever, no numbness, no headaches, no abdominal pain, no dysuria and no weakness. Past Medical History:   Diagnosis Date    Depression     medications     GERD (gastroesophageal reflux disease)     medications    Liver disease 2021    patient states that \"something is wrong with my liver\" but states has not been diagnosed.  Low iron        Past Surgical History:   Procedure Laterality Date    HX APPENDECTOMY      HX  SECTION      HX DILATION AND CURETTAGE      HX TONSILLECTOMY           History reviewed. No pertinent family history.     Social History     Socioeconomic History    Marital status:      Spouse name: Not on file    Number of children: Not on file    Years of education: Not on file    Highest education level: Not on file   Occupational History    Not on file   Tobacco Use    Smoking status: Never Smoker  Smokeless tobacco: Never Used   Vaping Use    Vaping Use: Never used   Substance and Sexual Activity    Alcohol use: Not Currently    Drug use: Not Currently    Sexual activity: Not on file   Other Topics Concern    Not on file   Social History Narrative    Not on file     Social Determinants of Health     Financial Resource Strain:     Difficulty of Paying Living Expenses:    Food Insecurity:     Worried About Running Out of Food in the Last Year:     [address] in the Last Year:    Transportation Needs:     Lack of Transportation (Medical):  Lack of Transportation (Non-Medical):    Physical Activity:     Days of Exercise per Week:     Minutes of Exercise per Session:    Stress:     Feeling of Stress :    Social Connections:     Frequency of Communication with Friends and Family:     Frequency of Social Gatherings with Friends and Family:     Attends [employer] Services:     Active Member of Clubs or Organizations:     Attends Club or Organization Meetings:     Marital Status:    Intimate Partner Violence:     Fear of Current or Ex-Partner:     Emotionally Abused:     Physically Abused:     Sexually Abused: ALLERGIES: Sulfa (sulfonamide antibiotics)    Review of Systems   Constitutional: Negative for chills, diaphoresis and fever. HENT: Negative for congestion, sneezing and sore throat. Eyes: Negative for visual disturbance. Respiratory: Negative for cough, chest tightness, shortness of breath and wheezing. Cardiovascular: Negative for chest pain and leg swelling. Gastrointestinal: Positive for nausea. Negative for abdominal pain, blood in stool, diarrhea and vomiting. Endocrine: Negative for polyuria. Genitourinary: Negative for difficulty urinating, dysuria, flank pain, hematuria and urgency. Musculoskeletal: Positive for back pain. Negative for myalgias, neck pain and neck stiffness. Skin: Negative for color change and rash.    Neurological: Negative for dizziness, syncope, speech difficulty, weakness, light-headedness, numbness and headaches. Psychiatric/Behavioral: Negative for behavioral problems. All other systems reviewed and are negative. Vitals:    06/26/21 1348   BP: 99/60   Pulse: 96   Resp: 16   Temp: 98 °F (36.7 °C)   SpO2: 100%   Weight: 53.1 kg (117 lb)   Height: 5' 1\" (1.549 m)            Physical Exam  Vitals and nursing note reviewed. Constitutional:       General: She is not in acute distress. Appearance: She is well-developed. She is not diaphoretic. Comments: Alert and oriented to person place and time. No acute distress, speaks in clear, fluid sentences. HENT:      Head: Normocephalic and atraumatic. Right Ear: External ear normal.      Left Ear: External ear normal.      Nose: Nose normal.   Eyes:      Pupils: Pupils are equal, round, and reactive to light. Cardiovascular:      Rate and Rhythm: Normal rate and regular rhythm. Heart sounds: Normal heart sounds. No murmur heard. No friction rub. No gallop. Pulmonary:      Effort: Pulmonary effort is normal. No respiratory distress. Breath sounds: Normal breath sounds. No stridor. No decreased breath sounds, wheezing, rhonchi or rales. Chest:      Chest wall: No tenderness. Abdominal:      General: There is no distension. Palpations: Abdomen is soft. There is hepatomegaly. There is no mass. Tenderness: There is abdominal tenderness in the epigastric area. There is right CVA tenderness and left CVA tenderness. There is no guarding or rebound. Hernia: No hernia is present. Comments: No significant pain on exam however there is some tenderness reported over the lower epigastrium with fullness present on my palpation. Suspect hepatomegaly. Significant right CVA tenderness, left CVA tenderness present as well. Musculoskeletal:         General: No tenderness or deformity. Normal range of motion.       Cervical back: Normal range of motion. Skin:     General: Skin is warm and dry. Neurological:      Mental Status: She is alert and oriented to person, place, and time. Cranial Nerves: No cranial nerve deficit. MDM  Number of Diagnoses or Management Options  Acute midline low back pain without sciatica: new and requires workup  Diagnosis management comments: Labs appear stable with slight elevation in patient's liver enzymes from baseline. CT imaging shows no acute pathology. Cirrhosis of the liver is noted with associated gastric and esophageal varices present. Ascites is also noted. No clear explanation for patient's back pain. This pain is better with Dilaudid in this department. Patient's urinalysis is unremarkable. No associated red flag symptoms such as neurologic changes, loss of bowel or bladder control or fevers associated with patient's back pain. Precaution I did reach out to GI to make close follow-up given that patient is established with GI for her chronic liver disease. Discussed plan of care with patient who voices understanding and agreement. Voice dictation software was used during the making of this note. This software is not perfect and grammatical and other typographical errors may be present. This note has been proofread, but may still contain errors.   601 Doctor Tye Najera [facility]; 6/26/2021 @4:52 PM   ===================================================================         Amount and/or Complexity of Data Reviewed  Clinical lab tests: ordered and reviewed  Tests in the radiology section of CPT®: ordered and reviewed  Tests in the medicine section of CPT®: ordered and reviewed  Independent visualization of images, tracings, or specimens: yes    Risk of Complications, Morbidity, and/or Mortality  Presenting problems: moderate  Diagnostic procedures: low  Management options: moderate    Patient Progress  Patient progress: stable    ED Course as of Jun 26 1651   Sat Jun 26, 2021   1632 CT scan is stable, will consult GI.    [BR]      ED Course User Index  [BR] Nafisa Anguiano, DO       Procedures

## 2021-06-26 NOTE — ED NOTES
I have reviewed discharge instructions with the patient. The patient verbalized understanding. Patient left ED via Discharge Method: ambulatory to Home with mother. Opportunity for questions and clarification provided. Patient given 3 scripts. To continue your aftercare when you leave the hospital, you may receive an automated call from our care team to check in on how you are doing. This is a free service and part of our promise to provide the best care and service to meet your aftercare needs.  If you have questions, or wish to unsubscribe from this service please call [phone]. Thank you for Choosing our [facility] Emergency Department.

## 2021-07-07 ENCOUNTER — TRANSCRIBE ORDER (OUTPATIENT)
Dept: SCHEDULING | Age: 41
End: 2021-07-07

## 2021-07-07 DIAGNOSIS — R11.2 NAUSEA AND VOMITING, INTRACTABILITY OF VOMITING NOT SPECIFIED, UNSPECIFIED VOMITING TYPE: ICD-10-CM

## 2021-07-07 DIAGNOSIS — K21.9 GASTROESOPHAGEAL REFLUX DISEASE WITHOUT ESOPHAGITIS: ICD-10-CM

## 2021-07-07 DIAGNOSIS — R68.81 EARLY SATIETY: Primary | ICD-10-CM

## 2021-07-07 DIAGNOSIS — R18.8 OTHER ASCITES: ICD-10-CM

## 2021-07-09 ENCOUNTER — HOSPITAL ENCOUNTER (OUTPATIENT)
Dept: NUCLEAR MEDICINE | Age: 41
Discharge: HOME | End: 2021-07-09
Attending: INTERNAL MEDICINE
Payer: COMMERCIAL

## 2021-07-09 DIAGNOSIS — R68.81 EARLY SATIETY: ICD-10-CM

## 2021-07-09 DIAGNOSIS — R11.2 NAUSEA AND VOMITING, INTRACTABILITY OF VOMITING NOT SPECIFIED, UNSPECIFIED VOMITING TYPE: ICD-10-CM

## 2021-07-09 DIAGNOSIS — K21.9 GASTROESOPHAGEAL REFLUX DISEASE WITHOUT ESOPHAGITIS: ICD-10-CM

## 2021-07-09 DIAGNOSIS — R18.8 OTHER ASCITES: ICD-10-CM

## 2021-07-09 PROCEDURE — A9541 TC99M SULFUR COLLOID: HCPCS

## 2021-07-23 ENCOUNTER — APPOINTMENT (OUTPATIENT)
Dept: GENERAL RADIOLOGY | Age: 41
DRG: 432 | End: 2021-07-23
Attending: EMERGENCY MEDICINE
Payer: COMMERCIAL

## 2021-07-23 ENCOUNTER — HOSPITAL ENCOUNTER (INPATIENT)
Age: 41
LOS: 5 days | Discharge: HOME | DRG: 432 | End: 2021-07-28
Attending: EMERGENCY MEDICINE | Admitting: FAMILY MEDICINE
Payer: COMMERCIAL

## 2021-07-23 DIAGNOSIS — R10.9 ACUTE ABDOMINAL PAIN: Primary | ICD-10-CM

## 2021-07-23 DIAGNOSIS — K74.69 CRYPTOGENIC CIRRHOSIS (HCC): ICD-10-CM

## 2021-07-23 DIAGNOSIS — R18.8 OTHER ASCITES: ICD-10-CM

## 2021-07-23 DIAGNOSIS — R18.8 ASCITES: ICD-10-CM

## 2021-07-23 PROBLEM — K72.90 LIVER FAILURE WITHOUT HEPATIC COMA (HCC): Status: ACTIVE | Noted: 2021-07-23

## 2021-07-23 LAB
ALBUMIN SERPL-MCNC: 3.3 G/DL (ref 3.5–5)
ALBUMIN/GLOB SERPL: 0.9 {RATIO} (ref 1.2–3.5)
ALP SERPL-CCNC: 214 U/L (ref 50–136)
ALT SERPL-CCNC: 77 U/L (ref 12–65)
AMMONIA PLAS-SCNC: 27 UMOL/L (ref 24.9–68)
ANION GAP SERPL CALC-SCNC: 8 MMOL/L (ref 7–16)
AST SERPL-CCNC: 131 U/L (ref 15–37)
BASOPHILS # BLD: 0 K/UL (ref 0–0.2)
BASOPHILS NFR BLD: 0 % (ref 0–2)
BILIRUB DIRECT SERPL-MCNC: 1.4 MG/DL
BILIRUB SERPL-MCNC: 3.7 MG/DL (ref 0.2–1.1)
BUN SERPL-MCNC: 9 MG/DL (ref 6–23)
CALCIUM SERPL-MCNC: 8.8 MG/DL (ref 8.3–10.4)
CHLORIDE SERPL-SCNC: 96 MMOL/L (ref 98–107)
CO2 SERPL-SCNC: 23 MMOL/L (ref 21–32)
CREAT SERPL-MCNC: 0.93 MG/DL (ref 0.6–1)
DIFFERENTIAL METHOD BLD: ABNORMAL
EOSINOPHIL # BLD: 0.2 K/UL (ref 0–0.8)
EOSINOPHIL NFR BLD: 2 % (ref 0.5–7.8)
ERYTHROCYTE [DISTWIDTH] IN BLOOD BY AUTOMATED COUNT: 19.5 % (ref 11.9–14.6)
GLOBULIN SER CALC-MCNC: 3.8 G/DL (ref 2.3–3.5)
GLUCOSE SERPL-MCNC: 94 MG/DL (ref 65–100)
HCT VFR BLD AUTO: 34.3 % (ref 35.8–46.3)
HGB BLD-MCNC: 11.7 G/DL (ref 11.7–15.4)
IMM GRANULOCYTES # BLD AUTO: 0.1 K/UL (ref 0–0.5)
IMM GRANULOCYTES NFR BLD AUTO: 1 % (ref 0–5)
INR PPP: 1.3
LYMPHOCYTES # BLD: 2.3 K/UL (ref 0.5–4.6)
LYMPHOCYTES NFR BLD: 17 % (ref 13–44)
MAGNESIUM SERPL-MCNC: 1.9 MG/DL (ref 1.8–2.4)
MCH RBC QN AUTO: 29.8 PG (ref 26.1–32.9)
MCHC RBC AUTO-ENTMCNC: 34.1 G/DL (ref 31.4–35)
MCV RBC AUTO: 87.3 FL (ref 79.6–97.8)
MONOCYTES # BLD: 0.9 K/UL (ref 0.1–1.3)
MONOCYTES NFR BLD: 7 % (ref 4–12)
NEUTS SEG # BLD: 9.7 K/UL (ref 1.7–8.2)
NEUTS SEG NFR BLD: 74 % (ref 43–78)
NRBC # BLD: 0 K/UL (ref 0–0.2)
PLATELET # BLD AUTO: 256 K/UL (ref 150–450)
PMV BLD AUTO: 10.6 FL (ref 9.4–12.3)
POTASSIUM SERPL-SCNC: 3.4 MMOL/L (ref 3.5–5.1)
PROT SERPL-MCNC: 7.1 G/DL (ref 6.3–8.2)
PROTHROMBIN TIME: 17.1 SEC (ref 12.5–14.7)
RBC # BLD AUTO: 3.93 M/UL (ref 4.05–5.2)
SODIUM SERPL-SCNC: 127 MMOL/L (ref 136–145)
WBC # BLD AUTO: 13.1 K/UL (ref 4.3–11.1)

## 2021-07-23 PROCEDURE — 83735 ASSAY OF MAGNESIUM: CPT

## 2021-07-23 PROCEDURE — 74011000258 HC RX REV CODE- 258: Performed by: EMERGENCY MEDICINE

## 2021-07-23 PROCEDURE — 71045 X-RAY EXAM CHEST 1 VIEW: CPT

## 2021-07-23 PROCEDURE — 82248 BILIRUBIN DIRECT: CPT

## 2021-07-23 PROCEDURE — 87040 BLOOD CULTURE FOR BACTERIA: CPT

## 2021-07-23 PROCEDURE — 74011250636 HC RX REV CODE- 250/636: Performed by: EMERGENCY MEDICINE

## 2021-07-23 PROCEDURE — 99283 EMERGENCY DEPT VISIT LOW MDM: CPT

## 2021-07-23 PROCEDURE — 83605 ASSAY OF LACTIC ACID: CPT

## 2021-07-23 PROCEDURE — 85025 COMPLETE CBC W/AUTO DIFF WBC: CPT

## 2021-07-23 PROCEDURE — 80053 COMPREHEN METABOLIC PANEL: CPT

## 2021-07-23 PROCEDURE — 85610 PROTHROMBIN TIME: CPT

## 2021-07-23 PROCEDURE — 65270000029 HC RM PRIVATE

## 2021-07-23 PROCEDURE — 84145 PROCALCITONIN (PCT): CPT

## 2021-07-23 PROCEDURE — 82140 ASSAY OF AMMONIA: CPT

## 2021-07-23 RX ORDER — MORPHINE SULFATE 2 MG/ML
2 INJECTION, SOLUTION INTRAMUSCULAR; INTRAVENOUS
Status: COMPLETED | OUTPATIENT
Start: 2021-07-23 | End: 2021-07-23

## 2021-07-23 RX ORDER — FUROSEMIDE 20 MG/1
20 TABLET ORAL DAILY
COMMUNITY

## 2021-07-23 RX ORDER — SPIRONOLACTONE 50 MG/1
50 TABLET, FILM COATED ORAL DAILY
COMMUNITY

## 2021-07-23 RX ORDER — ONDANSETRON 2 MG/ML
4 INJECTION INTRAMUSCULAR; INTRAVENOUS
Status: COMPLETED | OUTPATIENT
Start: 2021-07-23 | End: 2021-07-23

## 2021-07-23 RX ORDER — SODIUM CHLORIDE 9 MG/ML
200 INJECTION, SOLUTION INTRAVENOUS CONTINUOUS
Status: DISCONTINUED | OUTPATIENT
Start: 2021-07-23 | End: 2021-07-25

## 2021-07-23 RX ADMIN — SODIUM CHLORIDE 200 ML/HR: 900 INJECTION, SOLUTION INTRAVENOUS at 23:38

## 2021-07-23 RX ADMIN — MORPHINE SULFATE 2 MG: 2 INJECTION, SOLUTION INTRAMUSCULAR; INTRAVENOUS at 23:39

## 2021-07-23 RX ADMIN — CEFTRIAXONE SODIUM 2 G: 2 INJECTION, POWDER, FOR SOLUTION INTRAMUSCULAR; INTRAVENOUS at 23:38

## 2021-07-23 RX ADMIN — ONDANSETRON 4 MG: 2 INJECTION INTRAMUSCULAR; INTRAVENOUS at 23:38

## 2021-07-23 NOTE — ED TRIAGE NOTES
Pt c/o abdominal bloating and jaundice of the eye that started this week. Pt states she has been on diuretics since Tuesday. Pt states she is weak. Pt states she has stage 4 cirrhosis of the liver.

## 2021-07-24 PROBLEM — K72.90 LIVER FAILURE WITHOUT HEPATIC COMA (HCC): Chronic | Status: ACTIVE | Noted: 2021-07-23

## 2021-07-24 PROBLEM — R18.8 CIRRHOSIS OF LIVER WITH ASCITES (HCC): Chronic | Status: ACTIVE | Noted: 2021-07-24

## 2021-07-24 PROBLEM — K74.60 CIRRHOSIS OF LIVER WITH ASCITES (HCC): Chronic | Status: ACTIVE | Noted: 2021-07-24

## 2021-07-24 LAB
ALBUMIN SERPL-MCNC: 2.7 G/DL (ref 3.5–5)
ALBUMIN/GLOB SERPL: 0.9 {RATIO} (ref 1.2–3.5)
ALP SERPL-CCNC: 161 U/L (ref 50–136)
ALT SERPL-CCNC: 62 U/L (ref 12–65)
ANION GAP SERPL CALC-SCNC: 8 MMOL/L (ref 7–16)
APPEARANCE UR: CLEAR
AST SERPL-CCNC: 100 U/L (ref 15–37)
BACTERIA URNS QL MICRO: 0 /HPF
BASOPHILS # BLD: 0 K/UL (ref 0–0.2)
BASOPHILS NFR BLD: 0 % (ref 0–2)
BILIRUB SERPL-MCNC: 3 MG/DL (ref 0.2–1.1)
BILIRUB UR QL: ABNORMAL
BUN SERPL-MCNC: 9 MG/DL (ref 6–23)
CALCIUM SERPL-MCNC: 8 MG/DL (ref 8.3–10.4)
CASTS URNS QL MICRO: ABNORMAL /LPF
CHLORIDE SERPL-SCNC: 100 MMOL/L (ref 98–107)
CO2 SERPL-SCNC: 23 MMOL/L (ref 21–32)
COLOR UR: ABNORMAL
CREAT SERPL-MCNC: 0.6 MG/DL (ref 0.6–1)
DIFFERENTIAL METHOD BLD: ABNORMAL
EOSINOPHIL # BLD: 0.2 K/UL (ref 0–0.8)
EOSINOPHIL NFR BLD: 2 % (ref 0.5–7.8)
EPI CELLS #/AREA URNS HPF: ABNORMAL /HPF
ERYTHROCYTE [DISTWIDTH] IN BLOOD BY AUTOMATED COUNT: 19.5 % (ref 11.9–14.6)
GLOBULIN SER CALC-MCNC: 2.9 G/DL (ref 2.3–3.5)
GLUCOSE SERPL-MCNC: 83 MG/DL (ref 65–100)
GLUCOSE UR STRIP.AUTO-MCNC: NEGATIVE MG/DL
HCT VFR BLD AUTO: 29.4 % (ref 35.8–46.3)
HGB BLD-MCNC: 10.4 G/DL (ref 11.7–15.4)
HGB UR QL STRIP: ABNORMAL
IMM GRANULOCYTES # BLD AUTO: 0 K/UL (ref 0–0.5)
IMM GRANULOCYTES NFR BLD AUTO: 0 % (ref 0–5)
KETONES UR QL STRIP.AUTO: ABNORMAL MG/DL
LACTATE SERPL-SCNC: 1.2 MMOL/L (ref 0.4–2)
LACTATE SERPL-SCNC: 2.5 MMOL/L (ref 0.4–2)
LEUKOCYTE ESTERASE UR QL STRIP.AUTO: ABNORMAL
LYMPHOCYTES # BLD: 1.8 K/UL (ref 0.5–4.6)
LYMPHOCYTES NFR BLD: 18 % (ref 13–44)
MCH RBC QN AUTO: 30.1 PG (ref 26.1–32.9)
MCHC RBC AUTO-ENTMCNC: 35.4 G/DL (ref 31.4–35)
MCV RBC AUTO: 85.2 FL (ref 79.6–97.8)
MONOCYTES # BLD: 0.9 K/UL (ref 0.1–1.3)
MONOCYTES NFR BLD: 8 % (ref 4–12)
NEUTS SEG # BLD: 7.4 K/UL (ref 1.7–8.2)
NEUTS SEG NFR BLD: 72 % (ref 43–78)
NITRITE UR QL STRIP.AUTO: NEGATIVE
NRBC # BLD: 0 K/UL (ref 0–0.2)
PH UR STRIP: 7 [PH] (ref 5–9)
PLATELET # BLD AUTO: 198 K/UL (ref 150–450)
PMV BLD AUTO: 11.2 FL (ref 9.4–12.3)
POTASSIUM SERPL-SCNC: 3.9 MMOL/L (ref 3.5–5.1)
PROCALCITONIN SERPL-MCNC: 0.15 NG/ML
PROT SERPL-MCNC: 5.6 G/DL (ref 6.3–8.2)
PROT UR STRIP-MCNC: NEGATIVE MG/DL
RBC # BLD AUTO: 3.45 M/UL (ref 4.05–5.2)
RBC #/AREA URNS HPF: ABNORMAL /HPF
SODIUM SERPL-SCNC: 131 MMOL/L (ref 136–145)
SP GR UR REFRACTOMETRY: 1.02 (ref 1–1.02)
UROBILINOGEN UR QL STRIP.AUTO: 2 EU/DL (ref 0.2–1)
WBC # BLD AUTO: 10.3 K/UL (ref 4.3–11.1)
WBC URNS QL MICRO: ABNORMAL /HPF

## 2021-07-24 PROCEDURE — 80053 COMPREHEN METABOLIC PANEL: CPT

## 2021-07-24 PROCEDURE — 85025 COMPLETE CBC W/AUTO DIFF WBC: CPT

## 2021-07-24 PROCEDURE — P9047 ALBUMIN (HUMAN), 25%, 50ML: HCPCS | Performed by: NURSE PRACTITIONER

## 2021-07-24 PROCEDURE — 74011250637 HC RX REV CODE- 250/637: Performed by: FAMILY MEDICINE

## 2021-07-24 PROCEDURE — 87086 URINE CULTURE/COLONY COUNT: CPT

## 2021-07-24 PROCEDURE — 65270000029 HC RM PRIVATE

## 2021-07-24 PROCEDURE — 74011250636 HC RX REV CODE- 250/636: Performed by: NURSE PRACTITIONER

## 2021-07-24 PROCEDURE — 83605 ASSAY OF LACTIC ACID: CPT

## 2021-07-24 PROCEDURE — 74011250636 HC RX REV CODE- 250/636: Performed by: FAMILY MEDICINE

## 2021-07-24 PROCEDURE — 2709999900 HC NON-CHARGEABLE SUPPLY

## 2021-07-24 PROCEDURE — 81001 URINALYSIS AUTO W/SCOPE: CPT

## 2021-07-24 PROCEDURE — 74011250637 HC RX REV CODE- 250/637: Performed by: INTERNAL MEDICINE

## 2021-07-24 PROCEDURE — 36415 COLL VENOUS BLD VENIPUNCTURE: CPT

## 2021-07-24 RX ORDER — CETIRIZINE HCL 10 MG
10 TABLET ORAL DAILY
Status: DISCONTINUED | OUTPATIENT
Start: 2021-07-24 | End: 2021-07-25

## 2021-07-24 RX ORDER — SODIUM CHLORIDE 0.9 % (FLUSH) 0.9 %
5-40 SYRINGE (ML) INJECTION AS NEEDED
Status: DISCONTINUED | OUTPATIENT
Start: 2021-07-24 | End: 2021-07-25

## 2021-07-24 RX ORDER — SODIUM CHLORIDE 0.9 % (FLUSH) 0.9 %
5-40 SYRINGE (ML) INJECTION EVERY 8 HOURS
Status: DISCONTINUED | OUTPATIENT
Start: 2021-07-24 | End: 2021-07-25

## 2021-07-24 RX ORDER — ONDANSETRON 2 MG/ML
4 INJECTION INTRAMUSCULAR; INTRAVENOUS
Status: DISCONTINUED | OUTPATIENT
Start: 2021-07-24 | End: 2021-07-25

## 2021-07-24 RX ORDER — SENNOSIDES 8.6 MG/1
1 TABLET ORAL DAILY PRN
Status: DISCONTINUED | OUTPATIENT
Start: 2021-07-24 | End: 2021-07-25

## 2021-07-24 RX ORDER — ENOXAPARIN SODIUM 100 MG/ML
40 INJECTION SUBCUTANEOUS DAILY
Status: DISCONTINUED | OUTPATIENT
Start: 2021-07-24 | End: 2021-07-25

## 2021-07-24 RX ORDER — PROMETHAZINE HYDROCHLORIDE 25 MG/1
12.5 TABLET ORAL
Status: DISCONTINUED | OUTPATIENT
Start: 2021-07-24 | End: 2021-07-25

## 2021-07-24 RX ORDER — SODIUM CHLORIDE 9 MG/ML
75 INJECTION, SOLUTION INTRAVENOUS CONTINUOUS
Status: DISCONTINUED | OUTPATIENT
Start: 2021-07-24 | End: 2021-07-25

## 2021-07-24 RX ORDER — VALACYCLOVIR HYDROCHLORIDE 500 MG/1
1000 TABLET, FILM COATED ORAL DAILY
Status: DISCONTINUED | OUTPATIENT
Start: 2021-07-24 | End: 2021-07-25

## 2021-07-24 RX ORDER — FLUOXETINE HYDROCHLORIDE 20 MG/1
80 CAPSULE ORAL DAILY
Status: DISCONTINUED | OUTPATIENT
Start: 2021-07-24 | End: 2021-07-24

## 2021-07-24 RX ORDER — FUROSEMIDE 20 MG/1
20 TABLET ORAL DAILY
Status: DISCONTINUED | OUTPATIENT
Start: 2021-07-24 | End: 2021-07-25

## 2021-07-24 RX ORDER — MORPHINE SULFATE 4 MG/ML
4 INJECTION INTRAVENOUS
Status: DISCONTINUED | OUTPATIENT
Start: 2021-07-24 | End: 2021-07-25

## 2021-07-24 RX ORDER — SPIRONOLACTONE 25 MG/1
100 TABLET ORAL DAILY
Status: DISCONTINUED | OUTPATIENT
Start: 2021-07-24 | End: 2021-07-24

## 2021-07-24 RX ORDER — FLUOXETINE HYDROCHLORIDE 20 MG/1
40 CAPSULE ORAL DAILY
Status: DISCONTINUED | OUTPATIENT
Start: 2021-07-24 | End: 2021-07-25

## 2021-07-24 RX ORDER — LIDOCAINE 4 G/100G
1 PATCH TOPICAL EVERY 24 HOURS
Status: DISCONTINUED | OUTPATIENT
Start: 2021-07-24 | End: 2021-07-25

## 2021-07-24 RX ORDER — BENZONATATE 100 MG/1
100 CAPSULE ORAL
Status: DISCONTINUED | OUTPATIENT
Start: 2021-07-24 | End: 2021-07-25

## 2021-07-24 RX ORDER — ALBUMIN HUMAN 250 G/1000ML
12.5 SOLUTION INTRAVENOUS EVERY 6 HOURS
Status: COMPLETED | OUTPATIENT
Start: 2021-07-24 | End: 2021-07-25

## 2021-07-24 RX ORDER — SPIRONOLACTONE 25 MG/1
50 TABLET ORAL DAILY
Status: DISCONTINUED | OUTPATIENT
Start: 2021-07-24 | End: 2021-07-25

## 2021-07-24 RX ORDER — PANTOPRAZOLE SODIUM 40 MG/1
40 TABLET, DELAYED RELEASE ORAL DAILY
Status: DISCONTINUED | OUTPATIENT
Start: 2021-07-24 | End: 2021-07-25

## 2021-07-24 RX ADMIN — SODIUM CHLORIDE 75 ML/HR: 900 INJECTION, SOLUTION INTRAVENOUS at 22:09

## 2021-07-24 RX ADMIN — ALBUMIN (HUMAN) 12.5 G: 0.25 INJECTION, SOLUTION INTRAVENOUS at 12:39

## 2021-07-24 RX ADMIN — SPIRONOLACTONE 50 MG: 25 TABLET ORAL at 08:49

## 2021-07-24 RX ADMIN — VALACYCLOVIR HYDROCHLORIDE 1000 MG: 500 TABLET, FILM COATED ORAL at 08:49

## 2021-07-24 RX ADMIN — CETIRIZINE HYDROCHLORIDE 10 MG: 10 TABLET, FILM COATED ORAL at 08:48

## 2021-07-24 RX ADMIN — PANTOPRAZOLE SODIUM 40 MG: 40 TABLET, DELAYED RELEASE ORAL at 08:49

## 2021-07-24 RX ADMIN — Medication 10 ML: at 02:03

## 2021-07-24 RX ADMIN — SODIUM CHLORIDE 75 ML/HR: 900 INJECTION, SOLUTION INTRAVENOUS at 09:50

## 2021-07-24 RX ADMIN — BENZONATATE 100 MG: 100 CAPSULE ORAL at 17:40

## 2021-07-24 RX ADMIN — Medication 10 ML: at 22:02

## 2021-07-24 RX ADMIN — MORPHINE SULFATE 4 MG: 4 INJECTION INTRAVENOUS at 22:02

## 2021-07-24 RX ADMIN — ALBUMIN (HUMAN) 12.5 G: 0.25 INJECTION, SOLUTION INTRAVENOUS at 17:40

## 2021-07-24 RX ADMIN — FLUOXETINE 40 MG: 20 CAPSULE ORAL at 08:49

## 2021-07-24 RX ADMIN — Medication 10 ML: at 06:22

## 2021-07-24 RX ADMIN — SODIUM CHLORIDE 75 ML/HR: 900 INJECTION, SOLUTION INTRAVENOUS at 02:02

## 2021-07-24 RX ADMIN — Medication 10 ML: at 17:51

## 2021-07-24 RX ADMIN — FUROSEMIDE 20 MG: 20 TABLET ORAL at 17:39

## 2021-07-24 NOTE — ED PROVIDER NOTES
44-year-old female describes increasing abdominal discomfort with nausea and gagging over the last few days. Increasing abdominal distention as well. No fever chills. No vomiting or diarrhea. No dysuria. No dysuria but feels like she cannot urinate and has not urinated in 12+ hours. Slight cough. Is also noticed some jaundice recently as well. Review of records reveals patient has cryptogenic cirrhosis. She has had esophageal varices banded in the past.  Has had liver biopsy about 3 months ago. She denies to me any abnormal bleeding. No definite fever or chills. Patient had paracentesis 2 weeks ago. The history is provided by the patient. Fatigue  Associated symptoms include shortness of breath, vomiting and nausea. Pertinent negatives include no chest pain. Abdominal Pain   This is a new problem. The current episode started more than 2 days ago. The problem occurs constantly. The problem has been gradually worsening. The pain is associated with vomiting. The pain is located in the generalized abdominal region. The quality of the pain is dull and aching. The pain is moderate. Associated symptoms include nausea and vomiting. Pertinent negatives include no fever, no diarrhea, no hematochezia, no melena, no constipation, no dysuria, no hematuria, no chest pain and no back pain. Nothing worsens the pain. The pain is relieved by nothing. Past workup includes CT scan, ultrasound. Her past medical history does not include pancreatitis or diverticulitis. The patient's surgical history includes appendectomy. Past Medical History:   Diagnosis Date    Depression     medications     GERD (gastroesophageal reflux disease)     medications    Liver disease 2021    patient states that \"something is wrong with my liver\" but states has not been diagnosed.     Low iron        Past Surgical History:   Procedure Laterality Date    HX APPENDECTOMY      HX  SECTION      HX DILATION AND CURETTAGE  2018    HX TONSILLECTOMY  1990         No family history on file. Social History     Socioeconomic History    Marital status:      Spouse name: Not on file    Number of children: Not on file    Years of education: Not on file    Highest education level: Not on file   Occupational History    Not on file   Tobacco Use    Smoking status: Never Smoker    Smokeless tobacco: Never Used   Vaping Use    Vaping Use: Never used   Substance and Sexual Activity    Alcohol use: Not Currently    Drug use: Not Currently    Sexual activity: Not on file   Other Topics Concern    Not on file   Social History Narrative    Not on file     Social Determinants of Health     Financial Resource Strain:     Difficulty of Paying Living Expenses:    Food Insecurity:     Worried About Running Out of Food in the Last Year:     [address] in the Last Year:    Transportation Needs:     Lack of Transportation (Medical):  Lack of Transportation (Non-Medical):    Physical Activity:     Days of Exercise per Week:     Minutes of Exercise per Session:    Stress:     Feeling of Stress :    Social Connections:     Frequency of Communication with Friends and Family:     Frequency of Social Gatherings with Friends and Family:     Attends Sikhism Services:     Active Member of Clubs or Organizations:     Attends Club or Organization Meetings:     Marital Status:    Intimate Partner Violence:     Fear of Current or Ex-Partner:     Emotionally Abused:     Physically Abused:     Sexually Abused: ALLERGIES: Sulfa (sulfonamide antibiotics)    Review of Systems   Constitutional: Positive for fatigue. Negative for fever. Respiratory: Positive for cough and shortness of breath. Negative for wheezing. Cardiovascular: Negative for chest pain. Gastrointestinal: Positive for abdominal pain, nausea and vomiting. Negative for constipation, diarrhea, hematochezia and melena.    Genitourinary: Positive for difficulty urinating. Negative for dysuria and hematuria. Musculoskeletal: Negative for back pain. Skin: Negative for color change and rash. Vitals:    07/23/21 1950   BP: 103/68   Pulse: 90   Resp: 17   Temp: 99.7 °F (37.6 °C)   SpO2: 98%   Weight: 54.9 kg (121 lb)   Height: 5' 1\" (1.549 m)            Physical Exam  Vitals and nursing note reviewed. Constitutional:       General: She is in acute distress (Uncomfortable). Appearance: She is well-developed. She is not ill-appearing. HENT:      Head: Normocephalic and atraumatic. Right Ear: External ear normal.      Left Ear: External ear normal.      Mouth/Throat:      Pharynx: No oropharyngeal exudate. Eyes:      Conjunctiva/sclera: Conjunctivae normal.      Pupils: Pupils are equal, round, and reactive to light. Cardiovascular:      Rate and Rhythm: Normal rate and regular rhythm. Heart sounds: No murmur heard. Pulmonary:      Effort: No respiratory distress. Breath sounds: Normal breath sounds. Abdominal:      General: Bowel sounds are normal. There is distension. Palpations: Abdomen is soft. There is fluid wave. There is no mass. Tenderness: There is generalized abdominal tenderness. There is no guarding or rebound. Negative signs include McBurney's sign. Hernia: No hernia is present. Comments: Moderate ascites. Abdomen mildly tense. Not extremely tense   Musculoskeletal:      Cervical back: Normal range of motion and neck supple. Skin:     General: Skin is warm and dry. Neurological:      Mental Status: She is alert and oriented to person, place, and time. Gait: Gait normal.      Comments: Nl speech   Psychiatric:         Speech: Speech normal.          MDM  Number of Diagnoses or Management Options  Diagnosis management comments: Increasing abdominal discomfort and distention. Patient recently started back on the diuretics to see if this would help.   Is taken for few days without improvement. Procedures        Results Include:    Recent Results (from the past 24 hour(s))   CBC WITH AUTOMATED DIFF    Collection Time: 07/23/21  8:08 PM   Result Value Ref Range    WBC 13.1 (H) 4.3 - 11.1 K/uL    RBC 3.93 (L) 4.05 - 5.2 M/uL    HGB 11.7 11.7 - 15.4 g/dL    HCT 34.3 (L) 35.8 - 46.3 %    MCV 87.3 79.6 - 97.8 FL    MCH 29.8 26.1 - 32.9 PG    MCHC 34.1 31.4 - 35.0 g/dL    RDW 19.5 (H) 11.9 - 14.6 %    PLATELET 633 791 - 866 K/uL    MPV 10.6 9.4 - 12.3 FL    ABSOLUTE NRBC 0.00 0.0 - 0.2 K/uL    DF AUTOMATED      NEUTROPHILS 74 43 - 78 %    LYMPHOCYTES 17 13 - 44 %    MONOCYTES 7 4.0 - 12.0 %    EOSINOPHILS 2 0.5 - 7.8 %    BASOPHILS 0 0.0 - 2.0 %    IMMATURE GRANULOCYTES 1 0.0 - 5.0 %    ABS. NEUTROPHILS 9.7 (H) 1.7 - 8.2 K/UL    ABS. LYMPHOCYTES 2.3 0.5 - 4.6 K/UL    ABS. MONOCYTES 0.9 0.1 - 1.3 K/UL    ABS. EOSINOPHILS 0.2 0.0 - 0.8 K/UL    ABS. BASOPHILS 0.0 0.0 - 0.2 K/UL    ABS. IMM. GRANS. 0.1 0.0 - 0.5 K/UL   METABOLIC PANEL, COMPREHENSIVE    Collection Time: 07/23/21  8:08 PM   Result Value Ref Range    Sodium 127 (L) 136 - 145 mmol/L    Potassium 3.4 (L) 3.5 - 5.1 mmol/L    Chloride 96 (L) 98 - 107 mmol/L    CO2 23 21 - 32 mmol/L    Anion gap 8 7 - 16 mmol/L    Glucose 94 65 - 100 mg/dL    BUN 9 6 - 23 MG/DL    Creatinine 0.93 0.6 - 1.0 MG/DL    GFR est AA >60 >60 ml/min/1.73m2    GFR est non-AA >60 >60 ml/min/1.73m2    Calcium 8.8 8.3 - 10.4 MG/DL    Bilirubin, total 3.7 (H) 0.2 - 1.1 MG/DL    ALT (SGPT) 77 (H) 12 - 65 U/L    AST (SGOT) 131 (H) 15 - 37 U/L    Alk.  phosphatase 214 (H) 50 - 136 U/L    Protein, total 7.1 6.3 - 8.2 g/dL    Albumin 3.3 (L) 3.5 - 5.0 g/dL    Globulin 3.8 (H) 2.3 - 3.5 g/dL    A-G Ratio 0.9 (L) 1.2 - 3.5     BILIRUBIN, DIRECT    Collection Time: 07/23/21  8:08 PM   Result Value Ref Range    Bilirubin, direct 1.4 (H) <0.4 MG/DL   MAGNESIUM    Collection Time: 07/23/21  8:08 PM   Result Value Ref Range    Magnesium 1.9 1.8 - 2.4 mg/dL     Discussed with GI. I do have some concern for SBP since her abdomen is not extremely tense. They are in agreement with admission, IV antibiotics and consultation tomorrow for potential paracentesis at that time.

## 2021-07-24 NOTE — PROGRESS NOTES
Hospitalist Progress Note     Name:  Madelin Saint  Age:40 y.o. Sex:female   :  1980    MRN:  [id number]   Admit Date:  2021    Presenting Complaint: Jaundice and Fatigue    Initial Admission Diagnosis: Liver failure without hepatic coma [facility]) [K72.90]     Hospital Course/Interval history:     36year old CF PMH stage IV liver cirrhosis with last MELD score 18, depression admitted  for ascites. Last paracentesis 2 weeks ago, she has an upcoming appointment at [facility] for transplant options. Subjective (21): Alert and oriented x3. Afebrile. Assessment & Plan     1. Cirrhosis of the liver with ascites:  Last paracentesis 2 weeks ago  Will order Albumin and continue Lasix with Aldactone  GI consult pending      2. Depression:  stable  Home medications        Dispo/Discharge Planning:  Likely home next 1-2 days    Diet:  ADULT ORAL NUTRITION SUPPLEMENT Breakfast, Lunch, Dinner; Standard High Calorie/High Protein  ADULT DIET Regular; Low Sodium (2 gm)  DVT PPx: Lovenox SQ  Code status: Full Code    Objective:     Patient Vitals for the past 24 hrs:   Temp Pulse Resp BP SpO2   21 1035 98.4 °F (36.9 °C) 88 20 (!) 97/50 96 %   21 0705 98.2 °F (36.8 °C) 87 20 (!) 89/58 93 %   21 0350 98.5 °F (36.9 °C) 89 20 (!) 92/47 95 %   21 0020 99.4 °F (37.4 °C) 89 23 (!) 95/50 98 %   21 2339  94 22 (!) 95/51 97 %   21 1950 99.7 °F (37.6 °C) 90 17 103/68 98 %     Oxygen Therapy  O2 Sat (%): 96 % (21 1035)  Pulse via Oximetry: 89 beats per minute (21 0020)  O2 Device: None (Room air) (21 0350)    Body mass index is 23.12 kg/m².     Physical Exam:   General:  No acute distress, speaking in full sentences, no use of accessory muscles   Lungs:  Clear to auscultation bilaterally   CV:  Regular rate and rhythm with normal S1 and S2   Abdomen:  Ascites present, normoactive bowel sounds   Extremities:  No cyanosis clubbing or edema Neuro:  Nonfocal, A&O x3   Psych:  Normal affect     Data Review:  I have reviewed all labs, meds, and studies from the last 24 hours:    Labs:    Recent Results (from the past 24 hour(s))   CBC WITH AUTOMATED DIFF    Collection Time: 07/23/21  8:08 PM   Result Value Ref Range    WBC 13.1 (H) 4.3 - 11.1 K/uL    RBC 3.93 (L) 4.05 - 5.2 M/uL    HGB 11.7 11.7 - 15.4 g/dL    HCT 34.3 (L) 35.8 - 46.3 %    MCV 87.3 79.6 - 97.8 FL    MCH 29.8 26.1 - 32.9 PG    MCHC 34.1 31.4 - 35.0 g/dL    RDW 19.5 (H) 11.9 - 14.6 %    PLATELET 851 764 - 988 K/uL    MPV 10.6 9.4 - 12.3 FL    ABSOLUTE NRBC 0.00 0.0 - 0.2 K/uL    DF AUTOMATED      NEUTROPHILS 74 43 - 78 %    LYMPHOCYTES 17 13 - 44 %    MONOCYTES 7 4.0 - 12.0 %    EOSINOPHILS 2 0.5 - 7.8 %    BASOPHILS 0 0.0 - 2.0 %    IMMATURE GRANULOCYTES 1 0.0 - 5.0 %    ABS. NEUTROPHILS 9.7 (H) 1.7 - 8.2 K/UL    ABS. LYMPHOCYTES 2.3 0.5 - 4.6 K/UL    ABS. MONOCYTES 0.9 0.1 - 1.3 K/UL    ABS. EOSINOPHILS 0.2 0.0 - 0.8 K/UL    ABS. BASOPHILS 0.0 0.0 - 0.2 K/UL    ABS. IMM. GRANS. 0.1 0.0 - 0.5 K/UL   METABOLIC PANEL, COMPREHENSIVE    Collection Time: 07/23/21  8:08 PM   Result Value Ref Range    Sodium 127 (L) 136 - 145 mmol/L    Potassium 3.4 (L) 3.5 - 5.1 mmol/L    Chloride 96 (L) 98 - 107 mmol/L    CO2 23 21 - 32 mmol/L    Anion gap 8 7 - 16 mmol/L    Glucose 94 65 - 100 mg/dL    BUN 9 6 - 23 MG/DL    Creatinine 0.93 0.6 - 1.0 MG/DL    GFR est AA >60 >60 ml/min/1.73m2    GFR est non-AA >60 >60 ml/min/1.73m2    Calcium 8.8 8.3 - 10.4 MG/DL    Bilirubin, total 3.7 (H) 0.2 - 1.1 MG/DL    ALT (SGPT) 77 (H) 12 - 65 U/L    AST (SGOT) 131 (H) 15 - 37 U/L    Alk.  phosphatase 214 (H) 50 - 136 U/L    Protein, total 7.1 6.3 - 8.2 g/dL    Albumin 3.3 (L) 3.5 - 5.0 g/dL    Globulin 3.8 (H) 2.3 - 3.5 g/dL    A-G Ratio 0.9 (L) 1.2 - 3.5     BILIRUBIN, DIRECT    Collection Time: 07/23/21  8:08 PM   Result Value Ref Range    Bilirubin, direct 1.4 (H) <0.4 MG/DL   MAGNESIUM    Collection Time: 07/23/21 8:08 PM   Result Value Ref Range    Magnesium 1.9 1.8 - 2.4 mg/dL   AMMONIA    Collection Time: 07/23/21 11:14 PM   Result Value Ref Range    Ammonia 27 24.9 - 68 UMOL/L   PROTHROMBIN TIME + INR    Collection Time: 07/23/21 11:14 PM   Result Value Ref Range    Prothrombin time 17.1 (H) 12.5 - 14.7 sec    INR 1.3     LACTIC ACID    Collection Time: 07/23/21 11:14 PM   Result Value Ref Range    Lactic acid 2.5 (H) 0.4 - 2.0 MMOL/L   PROCALCITONIN    Collection Time: 07/23/21 11:14 PM   Result Value Ref Range    Procalcitonin 0.15 ng/mL   CULTURE, BLOOD    Collection Time: 07/23/21 11:14 PM    Specimen: Blood   Result Value Ref Range    Special Requests: RIGHT  Antecubital        Culture result: NO GROWTH AFTER 7 HOURS     CULTURE, BLOOD    Collection Time: 07/23/21 11:36 PM    Specimen: Blood   Result Value Ref Range    Special Requests: NO SPECIAL REQUESTS  LEFT  HAND        Culture result: NO GROWTH AFTER 7 HOURS     URINALYSIS W/ RFLX MICROSCOPIC    Collection Time: 07/24/21  2:43 AM   Result Value Ref Range    Color GINO      Appearance CLEAR      Specific gravity 1.016 1.001 - 1.023      pH (UA) 7.0 5.0 - 9.0      Protein Negative NEG mg/dL    Glucose Negative mg/dL    Ketone TRACE (A) NEG mg/dL    Bilirubin SMALL (A) NEG      Blood TRACE (A) NEG      Urobilinogen 2.0 (H) 0.2 - 1.0 EU/dL    Nitrites Negative NEG      Leukocyte Esterase MODERATE (A) NEG      WBC 10-20 0 /hpf    RBC 3-5 0 /hpf    Epithelial cells 0-3 0 /hpf    Bacteria 0 0 /hpf    Casts 0-3 0 /lpf   CBC WITH AUTOMATED DIFF    Collection Time: 07/24/21  6:20 AM   Result Value Ref Range    WBC 10.3 4.3 - 11.1 K/uL    RBC 3.45 (L) 4.05 - 5.2 M/uL    HGB 10.4 (L) 11.7 - 15.4 g/dL    HCT 29.4 (L) 35.8 - 46.3 %    MCV 85.2 79.6 - 97.8 FL    MCH 30.1 26.1 - 32.9 PG    MCHC 35.4 (H) 31.4 - 35.0 g/dL    RDW 19.5 (H) 11.9 - 14.6 %    PLATELET 072 372 - 788 K/uL    MPV 11.2 9.4 - 12.3 FL    ABSOLUTE NRBC 0.00 0.0 - 0.2 K/uL    DF AUTOMATED      NEUTROPHILS 72 43 - 78 %    LYMPHOCYTES 18 13 - 44 %    MONOCYTES 8 4.0 - 12.0 %    EOSINOPHILS 2 0.5 - 7.8 %    BASOPHILS 0 0.0 - 2.0 %    IMMATURE GRANULOCYTES 0 0.0 - 5.0 %    ABS. NEUTROPHILS 7.4 1.7 - 8.2 K/UL    ABS. LYMPHOCYTES 1.8 0.5 - 4.6 K/UL    ABS. MONOCYTES 0.9 0.1 - 1.3 K/UL    ABS. EOSINOPHILS 0.2 0.0 - 0.8 K/UL    ABS. BASOPHILS 0.0 0.0 - 0.2 K/UL    ABS. IMM. GRANS. 0.0 0.0 - 0.5 K/UL   METABOLIC PANEL, COMPREHENSIVE    Collection Time: 07/24/21  6:20 AM   Result Value Ref Range    Sodium 131 (L) 136 - 145 mmol/L    Potassium 3.9 3.5 - 5.1 mmol/L    Chloride 100 98 - 107 mmol/L    CO2 23 21 - 32 mmol/L    Anion gap 8 7 - 16 mmol/L    Glucose 83 65 - 100 mg/dL    BUN 9 6 - 23 MG/DL    Creatinine 0.60 0.6 - 1.0 MG/DL    GFR est AA >60 >60 ml/min/1.73m2    GFR est non-AA >60 >60 ml/min/1.73m2    Calcium 8.0 (L) 8.3 - 10.4 MG/DL    Bilirubin, total 3.0 (H) 0.2 - 1.1 MG/DL    ALT (SGPT) 62 12 - 65 U/L    AST (SGOT) 100 (H) 15 - 37 U/L    Alk.  phosphatase 161 (H) 50 - 136 U/L    Protein, total 5.6 (L) 6.3 - 8.2 g/dL    Albumin 2.7 (L) 3.5 - 5.0 g/dL    Globulin 2.9 2.3 - 3.5 g/dL    A-G Ratio 0.9 (L) 1.2 - 3.5     LACTIC ACID    Collection Time: 07/24/21  9:39 AM   Result Value Ref Range    Lactic acid 1.2 0.4 - 2.0 MMOL/L       All Micro Results     Procedure Component Value Units Date/Time    CULTURE, URINE [[id number]] Collected: 07/24/21 0245    Order Status: Completed Specimen: Urine from Clean catch Updated: 07/24/21 1015    CULTURE, BLOOD [[id number]] Collected: 07/23/21 2336    Order Status: Completed Specimen: Blood Updated: 07/24/21 0705     Special Requests: --        NO SPECIAL REQUESTS  LEFT  HAND       Culture result: NO GROWTH AFTER 7 HOURS       CULTURE, BLOOD [[id number]] Collected: 07/23/21 2314    Order Status: Completed Specimen: Blood Updated: 07/24/21 0705     Special Requests: --        RIGHT  Antecubital       Culture result: NO GROWTH AFTER 7 HOURS             EKG Results     None Other Studies:  XR CHEST PORT    Result Date: 7/23/2021  EXAM: Chest x-ray. INDICATION: Cough. COMPARISON: Prior CT abdomen on June 26, 2021. TECHNIQUE: Frontal view chest x-ray. FINDINGS: There is a new small right pleural effusion and mild right lung base atelectasis or infiltrate. The left lung and pleural space are clear. The heart size, mediastinal contour and pulmonary vasculature are normal. No pneumothorax is seen. The bony structures are intact. Mild right lower lobe atelectasis or infiltrate and small pleural effusion, new from the prior CT abdomen on June 26, 2021.       Current Meds:   Current Facility-Administered Medications   Medication Dose Route Frequency    cetirizine (ZYRTEC) tablet 10 mg  10 mg Oral DAILY    lidocaine 4 % patch 1 Patch  1 Patch TransDERmal Q24H    pantoprazole (PROTONIX) tablet 40 mg  40 mg Oral DAILY    valACYclovir (VALTREX) tablet 1,000 mg  1,000 mg Oral DAILY    0.9% sodium chloride infusion  75 mL/hr IntraVENous CONTINUOUS    sodium chloride (NS) flush 5-40 mL  5-40 mL IntraVENous Q8H    sodium chloride (NS) flush 5-40 mL  5-40 mL IntraVENous PRN    promethazine (PHENERGAN) tablet 12.5 mg  12.5 mg Oral Q6H PRN    Or    ondansetron (ZOFRAN) injection 4 mg  4 mg IntraVENous Q6H PRN    enoxaparin (LOVENOX) injection 40 mg  40 mg SubCUTAneous DAILY    senna (SENOKOT) tablet 8.6 mg  1 Tablet Oral DAILY PRN    spironolactone (ALDACTONE) tablet 50 mg  50 mg Oral DAILY    FLUoxetine (PROzac) capsule 40 mg  40 mg Oral DAILY    albumin human 25% (BUMINATE) solution 12.5 g  12.5 g IntraVENous Q6H    0.9% sodium chloride infusion  200 mL/hr IntraVENous CONTINUOUS       Problem List:  Hospital Problems as of 7/24/2021 Never Reviewed        Codes Class Noted - Resolved POA    Cirrhosis of liver with ascites (HCC) (Chronic) ICD-10-CM: K74.60, R18.8  ICD-9-CM: 571.5  7/24/2021 - Present Yes        * (Principal) Liver failure without hepatic coma (HCC) (Chronic) ICD-10-CM: K72.90  ICD-9-CM: 572.8  7/23/2021 - Present Yes                 Signed By: Lucinda Jewell NP   [facility] Hospitalist Service    July 24, 2021  5:15 PM

## 2021-07-24 NOTE — CONSULTS
[facility] Consult Note       Primary GI Physician: Dr. Case Root     Referring Provider:  Dr. Akua Valdez    Consult Date:  7/24/2021    Admit Date:  7/23/2021    Chief Complaint:  Ascites, Cirrhosis    Subjective:     History of Present Illness:  Patient is a 36 y.o. female with PMH including but not limited to GERD, Cryptogenic Cirrhosis, Ascites, who is seen in consultation at the request of Dr. Akua Valdez for cirrhosis and ascites. Mrs. Sara Rasheed is known to Dr. Case Root and last saw him on 7/7/2021 for follow-up of cirrhosis with GERD, early satiety, nausea, and vomiting. She has been referred to [facility] and has also seen [address]. Last paracentesis was 2 weeks ago. She presented to the ED on 7/23 with complaints of increasing abdominal distention and bloating. Patient reports that ascites began re-accumulating shortly after paracentesis on 7/2. She denies any abdominal pain. Reports early satiety. She has had a cough with the ascites and took Mucinex-DM, Robitussin-DM, and Delsym without improvement. She reports coughing up clear mucus. CXR showed right lower lobe atelectasis or infiltrate and small pleural effusion, new from prior CT on 6/26/2021. She reports having brown stools with mucus. Denies any melena/hematochezia. There has been no periods of confusion. She had a liver biopsy about 3 months ago, which showed cirrhosis. However the cause of her liver cirrhosis is unknown. EGD 4/9/2021 with findings of single grade I Esophageal Varix, PHG, gastritis. Surveillance EGD is due 4/2022. Colonoscopy 4/29/2021 with , [facility]. Due 2028. PMH:  Past Medical History:   Diagnosis Date    Depression     medications     GERD (gastroesophageal reflux disease)     medications    Liver disease 03/2021    patient states that \"something is wrong with my liver\" but states has not been diagnosed.     Low iron        PSH:  Past Surgical History:   Procedure Laterality Date    HX APPENDECTOMY  2015    HX  SECTION      HX DILATION AND CURETTAGE      HX TONSILLECTOMY         Allergies: Allergies   Allergen Reactions    Sulfa (Sulfonamide Antibiotics) Rash       Home Medications:  Prior to Admission medications    Medication Sig Start Date End Date Taking? Authorizing Provider   furosemide (LASIX) 20 mg tablet Take 20 mg by mouth daily. Yes Other, MD John   spironolactone (ALDACTONE) 50 mg tablet Take 50 mg by mouth daily. Yes Other, MD John   lidocaine (LIDODERM) 5 % Apply patch to the affected area for 12 hours a day and remove for 12 hours a day. 21  Yes Tomas Velez PA   cetirizine (ZYRTEC) 10 mg tablet Take 10 mg by mouth daily. 20  Yes Provider, Historical   FLUoxetine (PROzac) 40 mg capsule Take 40 mg by mouth daily. 20  Yes Provider, Historical   valACYclovir (VALTREX) 1 gram tablet Take 1,000 mg by mouth daily. 20  Yes Provider, Historical   pantoprazole (PROTONIX) 40 mg tablet Take 40 mg by mouth daily. Yes Provider, Historical   ondansetron (ZOFRAN ODT) 4 mg disintegrating tablet Take 1 Tablet by mouth every eight (8) hours as needed for Nausea. Patient not taking: Reported on 2021   Ashley Gomez DO   dicyclomine (BENTYL) 10 mg capsule Take 1 Cap by mouth four (4) times daily as needed for Abdominal Cramps. Patient not taking: Reported on 2021   LOREN Cole   diclofenac (VOLTAREN) 1 % gel Apply 2 g to affected area four (4) times daily. Patient not taking: Reported on 2021   LOREN Cole   magnesium oxide (MAG-OX) 400 mg tablet Take  by mouth daily. Patient not taking: Reported on 2021    Provider, Historical   therapeutic multivitamin [facility]) tablet Take 1 Tab by mouth daily.   Patient not taking: Reported on 2021    Provider, Historical       Hospital Medications:  Current Facility-Administered Medications   Medication Dose Route Frequency    cetirizine (ZYRTEC) tablet 10 mg  10 mg Oral DAILY    lidocaine 4 % patch 1 Patch  1 Patch TransDERmal Q24H    pantoprazole (PROTONIX) tablet 40 mg  40 mg Oral DAILY    valACYclovir (VALTREX) tablet 1,000 mg  1,000 mg Oral DAILY    0.9% sodium chloride infusion  75 mL/hr IntraVENous CONTINUOUS    sodium chloride (NS) flush 5-40 mL  5-40 mL IntraVENous Q8H    sodium chloride (NS) flush 5-40 mL  5-40 mL IntraVENous PRN    promethazine (PHENERGAN) tablet 12.5 mg  12.5 mg Oral Q6H PRN    Or    ondansetron (ZOFRAN) injection 4 mg  4 mg IntraVENous Q6H PRN    enoxaparin (LOVENOX) injection 40 mg  40 mg SubCUTAneous DAILY    senna (SENOKOT) tablet 8.6 mg  1 Tablet Oral DAILY PRN    spironolactone (ALDACTONE) tablet 50 mg  50 mg Oral DAILY    FLUoxetine (PROzac) capsule 40 mg  40 mg Oral DAILY    albumin human 25% (BUMINATE) solution 12.5 g  12.5 g IntraVENous Q6H    0.9% sodium chloride infusion  200 mL/hr IntraVENous CONTINUOUS       Social History:  Social History     Tobacco Use    Smoking status: Never Smoker    Smokeless tobacco: Never Used   Substance Use Topics    Alcohol use: Not Currently       Pt denies any history of drug use, blood transfusions, or tattoos. Family History:  No family history on file. Review of Systems:  A detailed 10 system ROS is obtained, with pertinent positives as listed above. All others are negative. Diet:      Objective:     Physical Exam:  Vitals:  Visit Vitals  BP (!) 97/50 (BP 1 Location: Right upper arm, BP Patient Position: At rest;Supine) Comment: nurse notify    Pulse 88   Temp 98.4 °F (36.9 °C)   Resp 20   Ht 5' 1\" (1.549 m)   Wt 55.5 kg (122 lb 5.7 oz)   SpO2 96%   Breastfeeding No   BMI 23.12 kg/m²     Gen:  Pt is alert, cooperative, no acute distress, JAUNDICED  Skin:  Extremities and face reveal no rashes. HEENT: Sclerae ICTERIC. Anastasia Sida Extra-occular muscles are intact. No oral ulcers. No abnormal pigmentation of the lips.   The neck is supple. Cardiovascular: Regular rate and rhythm. No murmurs, gallops, or rubs. Respiratory:  Comfortable breathing with no accessory muscle use. Clear breath sounds anteriorly with no wheezes, rales, or rhonchi. GI:  Abdomen Distended, soft, Non-tender. Normal active bowel sounds. No enlargement of the liver or spleen. No masses palpable. Rectal:  Deferred  Musculoskeletal:  No pitting edema of the lower legs. Neurological:  Gross memory appears intact. Patient is alert and oriented. Psychiatric:  Mood appears appropriate with judgement intact. Lymphatic:  No cervical or supraclavicular adenopathy. Laboratory:    Recent Labs     07/24/21  0620 07/23/21  2314 07/23/21 2008   WBC 10.3  --  13.1*   HGB 10.4*  --  11.7   HCT 29.4*  --  34.3*     --  256   MCV 85.2  --  87.3   *  --  127*   K 3.9  --  3.4*     --  96*   CO2 23  --  23   BUN 9  --  9   CREA 0.60  --  0.93   CA 8.0*  --  8.8   MG  --   --  1.9   GLU 83  --  94   *  --  214*   *  --  131*   ALT 62  --  77*   TBILI 3.0*  --  3.7*   CBIL  --   --  1.4*   ALB 2.7*  --  3.3*   TP 5.6*  --  7.1   PTP  --  17.1*  --    INR  --  1.3  --       Gastric Emptying Study     INDICATION: Nausea and vomiting     Imaging of the abdomen was performed for 4 hours after oral ingestion of eggs  labeled with 1 mCi of technetium sulfur colloid     FINDINGS:   95% of activity remains in stomach at 1 hour  22% of activity remains in stomach at 2 hours  0% of activity remains in stomach at 4 hours     IMPRESSION  Normal Gastric emptying study. CT ABD PELV W CONT     Result Date: 6/26/2021  CT ABDOMEN AND PELVIS WITH CONTRAST 6/26/2021 4:23 PM History: ; Pt states she has stage 4 cirrhosis and has had severe back pain, nausea and vomiting. TECHNIQUE: The patient received oral contrast and 100 mL Isovue-370 nonionic IV contrast. Axial images were obtained through the abdomen and pelvis. Coronal reformatted images were generated.   All CT scans at this facility used dose modulation, interactive reconstruction and/or weight based dosing when appropriate to reduce radiation dose to as low as reasonably achievable. Comparison: April 16, 2021 Findings: Included portions of the lung bases are clear. ABDOMEN: Gallbladder: Normal. Liver: Cirrhotic liver morphology. No focal hepatic masses. Main portal vein is patent. A moderate volume ascites is present. Distal esophageal and perigastric varices are present. Pancreas: Normal. Spleen: There are no focal splenic lesions. The spleen measures 12.5 cm in length. Adrenal glands: Normal. Kidneys: The kidneys enhance symmetrically with contrast and there is no hydronephrosis. Bowel: A surgical staple line is present around the cecal tip. Mild wall thickening of the ascending colon may be changes associated with low oncotic pressure and ascites. Underlying colitis is not excluded but less likely. Small bowel loops are normal in caliber. Retroperitoneum:No adenopathy. Abdominal aorta is normal in caliber. PELVIS: The uterus is present. A few calcified pelvic phleboliths are present. The bladder is normal in appearance. Bones: There are no aggressive osseous lesions.      Cirrhosis with esophageal and gastric varices and moderate volume ascites.       -------------------------------    Performed by:     Name: Kelly Pastrana   Accession Number:   [id number]   MR #   [id number]   Date Obtained:   4/8/2021       * * *DIAGNOSIS* * * * * * * * * * * * * * * * * * * * * * * * * * * * * * *            \"LIVER, NEEDLE BIOPSY\":  LIVER HAVING CHANGES CONSISTENT WITH STAGE 4   OF 4 FIBROSIS (CIRRHOSIS) IN ASSOCIATION WITH FOCAL MINIMAL PATCHY   INFILTRATE OF PORTAL TRIADS WITHOUT SIGNIFICANT INVOLVEMENT OF LIMITING   PLATE.  FOCAL MILD FATTY METAMORPHOSIS.           tls/4/9/2021     * * *Electronically Signed Out* * *         Sign Out Date: 4/9/2021 Mignon Youssef M.D.           * * *MICROSCOPIC DESCRIPTION* * * * * * * * * * * * * * * * * * * * * * *            Microscopic examination reveals tissue consistent with needle biopsy   of liver having focal mild fatty metamorphosis in association with   increased dense fibrous connective tissue focally forming bands with   apparent regenerative nodules.  Trichrome stain confirms bands of dense   fibrous connective tissue.  Greater than 6 portal triads are noted. Inflammation is minimal and patchy composed primarily of small round   lymphocytes without significant involvement of limiting plate.  Rare   polymorphonuclear leukocytes are noted.  Special stains for iron have no   significant increase in iron stores.  Reticulin stains have only 1-2 cells   between sinusoids.  PAS stains have no significant histologic   abnormalities.  Deeper cuts have similar findings.  Dr. Jimmy Frazier concurs. Assessment:     Principal Problem:    Liver failure without hepatic coma ([facility] (7/23/2021)    Active Problems:    Cirrhosis of liver with ascites ([facility] [address] (7/24/2021)    Patient is a 36 y.o. female with PMH including but not limited to GERD, Cryptogenic Cirrhosis, Ascites, Esophageal Varices who is seen in consultation at the request of Dr. Pipe Daley for cirrhosis and ascites. Patient has been followed by Dr. Alejandra Dupree and Malgorzata Cosby. Has an appt with [facility] coming up. She has had complete liver serology work up and Liver biopsy. S/P Paracentesis on 7/2/2021 with 1.8L removed. TB 3.0, , ALT 62, , ammonia 27, Na 131, WBC 10.3, HGB 10.4, PlT 198, Creatinine 0.6. INR 1.3. MELD: 14, MELD-NA: 19. Blood cultures and Urine culture with no growth to date. She reports coughing up clear mucus. CXR showed right lower lobe atelectasis or infiltrate and small pleural effusion, new from prior CT on 6/26/2021. In April 2021, her MELD was 10. Plan:     Continue with Aldactone 50 and lasix 20. Received albumin today.   Follow labs  Paracentesis with fluid studies  She is requesting tessalon pearls. I will discuss this with Dr. Shira Beltrán. Continue with plans for evaluation at [facility] in September  ADD INR onto tomorrow AM labs, per family request      Jason Blanca NP    Patient is seen and examined in collaboration with Dr. Shira Beltrán. .  Assessment and plan as per Dr. Shira Beltrán. Shabbir Colin

## 2021-07-24 NOTE — H&P
[facility] Hospitalist Service  History and Physical    Patient ID:  Lorri Floyd  female  1980  [id number]    Admission Date: 7/23/2021  Chief Complaint: Abdominal distention and jaundice  Reason for Admission: Liver failure    ASSESSMENT & PLAN:    [facility] Problems    Diagnosis Date Noted    Cirrhosis of liver with ascites ([facility] [address] 07/24/2021    Liver failure without hepatic coma ([facility] [address] 07/23/2021     Principal Problem:    Liver failure without hepatic coma ([facility] [address] (7/23/2021)      Cirrhosis of liver with ascites ([facility] [address] (7/24/2021)  Gentle IV fluids as she has appears to be intravascularly depleted. Consult GI. Patient is awaiting an appointment at [facility] to discuss transplant. That appointment is in September. She does need another paracentesis, her last was 2 weeks ago. Disposition: admit to inpatient [facility]  Diet: Regular  VTE ppx: Lovenox   CODE STATUS: Full    Surrogate decision-maker:     HISTORY OF PRESENT ILLNESS:  Patient was discussed with the ER provider prior to seeing the patient. Patient is a 36 y.o. female with who presented to the ED with complaints of increasing abdominal distention and bloating. She has fairly recently diagnosed stage IV liver failure and is awaiting an appointment at [facility] to discuss transplant options. He states she started having nausea with gagging over the last few days. She has not had any vomiting per se. She did have a paracentesis about 2 weeks ago, she feels her abdomen is now more distended than it was at that time. She has now developed yellowing in her eyes, which is new. She reports she has not had any urine in over 12 hours and does not feel the urge to go. She had a liver biopsy about 3 months ago, which showed advanced cirrhosis. However the cause of her liver cirrhosis is unknown. She is feeling short of breath secondary to ascites, but is not hypoxic.   She denies any fever/chills, change in bowel movements, chest pain, dysuria, or hematuria. REVIEW OF SYSTEMS:  A 14 point review of systems was taken and pertinent positive and negative as per HPI. Allergies   Allergen Reactions    Sulfa (Sulfonamide Antibiotics) Rash       Prior to Admission Medications   Prescriptions Last Dose Informant Patient Reported? Taking? FLUoxetine (PROzac) 40 mg capsule   Yes Yes   Sig: Take 80 mg by mouth daily. cetirizine (ZYRTEC) 10 mg tablet   Yes Yes   Sig: Take 10 mg by mouth daily. diclofenac (VOLTAREN) 1 % gel Not Taking at Unknown time  No No   Sig: Apply 2 g to affected area four (4) times daily. Patient not taking: Reported on 2021   dicyclomine (BENTYL) 10 mg capsule Not Taking at Unknown time  No No   Sig: Take 1 Cap by mouth four (4) times daily as needed for Abdominal Cramps. Patient not taking: Reported on 2021   furosemide (LASIX) 20 mg tablet   Yes Yes   Si mg.   lidocaine (LIDODERM) 5 %   No Yes   Sig: Apply patch to the affected area for 12 hours a day and remove for 12 hours a day. magnesium oxide (MAG-OX) 400 mg tablet Not Taking at Unknown time  Yes No   Sig: Take  by mouth daily. Patient not taking: Reported on 2021   ondansetron (ZOFRAN ODT) 4 mg disintegrating tablet Not Taking at Unknown time  No No   Sig: Take 1 Tablet by mouth every eight (8) hours as needed for Nausea. Patient not taking: Reported on 2021   pantoprazole (PROTONIX) 20 mg tablet   Yes Yes   Sig: Take 20 mg by mouth daily. spironolactone (ALDACTONE) 100 mg tablet   Yes Yes   Si mg.   therapeutic multivitamin (THERAGRAN) tablet Not Taking at Unknown time  Yes No   Sig: Take 1 Tab by mouth daily. Patient not taking: Reported on 2021   valACYclovir (VALTREX) 1 gram tablet   Yes Yes   Sig: Take 1,000 mg by mouth daily.       Facility-Administered Medications: None       Past Medical History:   Diagnosis Date    Depression     medications     GERD (gastroesophageal reflux disease)     medications    Liver disease 2021    patient states that \"something is wrong with my liver\" but states has not been diagnosed.  Low iron      Past Surgical History:   Procedure Laterality Date    HX APPENDECTOMY      HX  SECTION      HX DILATION AND CURETTAGE      HX TONSILLECTOMY         Social History     Tobacco Use    Smoking status: Never Smoker    Smokeless tobacco: Never Used   Substance Use Topics    Alcohol use: Not Currently       FAMILY HISTORY:  Reviewed and non-contributory to admitting problem, unless otherwise stated in the HPI    No family history on file. PHYSICAL EXAM:    Patient Vitals for the past 24 hrs:   Temp Pulse Resp BP SpO2   21 2339  94 22 (!) 95/51 97 %   21 1950 99.7 °F (37.6 °C) 90 17 103/68 98 %     Visit Vitals  BP (!) 95/51   Pulse 94   Temp 99.7 °F (37.6 °C)   Resp 22   Ht 5' 1\" (1.549 m)   Wt 54.9 kg (121 lb)   SpO2 97%   BMI 22.86 kg/m²       General:    WD and WN, No apparent distress. Eyes:  PERRL; EOMI; sclera normal/non-icteric  HENT:  Normocephalic, without obvious abnormality; Oropharynx is clear with tacky mucous membranes   Resp:    Clear to auscultation bilaterally, diminished in the bases. Shallow breathing, resp are even and appear labored although she is not hypoxic  CVS/Heart: Regular rate and rhythm,  No LE edema    GI:    Firm, generalized tenderness. + distended. Bowel sounds normal.     Musc/SK: Muscle strength is appropriate for age/condition; No cyanosis.  No clubbing  Skin:  No obvious rash/lesions  Neurologic: CN II - XII are grossly intact - Eye exam as noted above; non focal  Psych: Alert and oriented x 4;  Judgement and insight are normal      Intake/Output last 3 shifts:      Labs:  CMP:   Lab Results   Component Value Date/Time     (L) 2021 08:08 PM    K 3.4 (L) 2021 08:08 PM    CL 96 (L) 2021 08:08 PM    CO2 23 2021 08:08 PM    AGAP 8 2021 08:08 PM    GLU 94 2021 08:08 PM    BUN 9 07/23/2021 08:08 PM    CREA 0.93 07/23/2021 08:08 PM    GFRAA >60 07/23/2021 08:08 PM    GFRNA >60 07/23/2021 08:08 PM    CA 8.8 07/23/2021 08:08 PM    MG 1.9 07/23/2021 08:08 PM    ALB 3.3 (L) 07/23/2021 08:08 PM    TBILI 3.7 (H) 07/23/2021 08:08 PM    TP 7.1 07/23/2021 08:08 PM    GLOB 3.8 (H) 07/23/2021 08:08 PM    AGRAT 0.9 (L) 07/23/2021 08:08 PM    ALT 77 (H) 07/23/2021 08:08 PM         CBC:    Lab Results   Component Value Date/Time    WBC 13.1 (H) 07/23/2021 08:08 PM    HGB 11.7 07/23/2021 08:08 PM    HCT 34.3 (L) 07/23/2021 08:08 PM     07/23/2021 08:08 PM       Lab Results   Component Value Date/Time    INR 1.3 07/23/2021 11:14 PM    INR 1.2 04/16/2021 09:43 AM    Prothrombin time 17.1 (H) 07/23/2021 11:14 PM    Prothrombin time 15.0 (H) 04/16/2021 09:43 AM       ABG:  No results found for: PH, PHI, PCO2, PCO2I, PO2, PO2I, HCO3, HCO3I, FIO2, FIO2I        No results found for: CPK, RCK1, RCK2, RCK3, RCK4, CKMB, CKNDX, CKND1, TROPT, TROIQ, BNPP, BNP    Imaging & Other Studies:  NM GASTRIC EMPTY STDY    Result Date: 7/9/2021  Gastric Emptying Study INDICATION: Nausea and vomiting Imaging of the abdomen was performed for 4 hours after oral ingestion of eggs labeled with 1 mCi of technetium sulfur colloid FINDINGS: 95% of activity remains in stomach at 1 hour 22% of activity remains in stomach at 2 hours 0% of activity remains in stomach at 4 hours     Normal Gastric emptying study. CT ABD PELV W CONT    Result Date: 6/26/2021  CT ABDOMEN AND PELVIS WITH CONTRAST 6/26/2021 4:23 PM History: ; Pt states she has stage 4 cirrhosis and has had severe back pain, nausea and vomiting. TECHNIQUE: The patient received oral contrast and 100 mL Isovue-370 nonionic IV contrast. Axial images were obtained through the abdomen and pelvis. Coronal reformatted images were generated.   All CT scans at this facility used dose modulation, interactive reconstruction and/or weight based dosing when appropriate to reduce radiation dose to as low as reasonably achievable. Comparison: April 16, 2021 Findings: Included portions of the lung bases are clear. ABDOMEN: Gallbladder: Normal. Liver: Cirrhotic liver morphology. No focal hepatic masses. Main portal vein is patent. A moderate volume ascites is present. Distal esophageal and perigastric varices are present. Pancreas: Normal. Spleen: There are no focal splenic lesions. The spleen measures 12.5 cm in length. Adrenal glands: Normal. Kidneys: The kidneys enhance symmetrically with contrast and there is no hydronephrosis. Bowel: A surgical staple line is present around the cecal tip. Mild wall thickening of the ascending colon may be changes associated with low oncotic pressure and ascites. Underlying colitis is not excluded but less likely. Small bowel loops are normal in caliber. Retroperitoneum:No adenopathy. Abdominal aorta is normal in caliber. PELVIS: The uterus is present. A few calcified pelvic phleboliths are present. The bladder is normal in appearance. Bones: There are no aggressive osseous lesions. Cirrhosis with esophageal and gastric varices and moderate volume ascites. XR CHEST PORT    Result Date: 7/23/2021  EXAM: Chest x-ray. INDICATION: Cough. COMPARISON: Prior CT abdomen on June 26, 2021. TECHNIQUE: Frontal view chest x-ray. FINDINGS: There is a new small right pleural effusion and mild right lung base atelectasis or infiltrate. The left lung and pleural space are clear. The heart size, mediastinal contour and pulmonary vasculature are normal. No pneumothorax is seen. The bony structures are intact. Mild right lower lobe atelectasis or infiltrate and small pleural effusion, new from the prior CT abdomen on June 26, 2021.      EKG Results     None          Active Problems:  Patient Active Problem List    Diagnosis Date Noted    Cirrhosis of liver with ascites ([address] 07/24/2021    Liver failure without hepatic coma ([address] 07/23/2021 Tierra López MD  [facility] Hospitalist Service  7/24/2021 12:16 AM

## 2021-07-25 LAB
ALBUMIN SERPL-MCNC: 2.8 G/DL (ref 3.5–5)
ALBUMIN/GLOB SERPL: 1 {RATIO} (ref 1.2–3.5)
ALP SERPL-CCNC: 138 U/L (ref 50–130)
ALT SERPL-CCNC: 53 U/L (ref 12–65)
ANION GAP SERPL CALC-SCNC: 5 MMOL/L (ref 7–16)
AST SERPL-CCNC: 100 U/L (ref 15–37)
BASOPHILS # BLD: 0 K/UL (ref 0–0.2)
BASOPHILS NFR BLD: 0 % (ref 0–2)
BILIRUB SERPL-MCNC: 2.7 MG/DL (ref 0.2–1.1)
BUN SERPL-MCNC: 6 MG/DL (ref 6–23)
CALCIUM SERPL-MCNC: 7.7 MG/DL (ref 8.3–10.4)
CHLORIDE SERPL-SCNC: 106 MMOL/L (ref 98–107)
CO2 SERPL-SCNC: 24 MMOL/L (ref 21–32)
CREAT SERPL-MCNC: 0.53 MG/DL (ref 0.6–1)
DIFFERENTIAL METHOD BLD: ABNORMAL
EOSINOPHIL # BLD: 0.2 K/UL (ref 0–0.8)
EOSINOPHIL NFR BLD: 3 % (ref 0.5–7.8)
ERYTHROCYTE [DISTWIDTH] IN BLOOD BY AUTOMATED COUNT: 19.5 % (ref 11.9–14.6)
GLOBULIN SER CALC-MCNC: 2.7 G/DL (ref 2.3–3.5)
GLUCOSE SERPL-MCNC: 86 MG/DL (ref 65–100)
HCT VFR BLD AUTO: 27.5 % (ref 35.8–46.3)
HGB BLD-MCNC: 9.5 G/DL (ref 11.7–15.4)
IMM GRANULOCYTES # BLD AUTO: 0 K/UL (ref 0–0.5)
IMM GRANULOCYTES NFR BLD AUTO: 0 % (ref 0–5)
INR PPP: 1.4
LYMPHOCYTES # BLD: 1.5 K/UL (ref 0.5–4.6)
LYMPHOCYTES NFR BLD: 18 % (ref 13–44)
MAGNESIUM SERPL-MCNC: 2 MG/DL (ref 1.8–2.4)
MCH RBC QN AUTO: 30.1 PG (ref 26.1–32.9)
MCHC RBC AUTO-ENTMCNC: 34.5 G/DL (ref 31.4–35)
MCV RBC AUTO: 87 FL (ref 79.6–97.8)
MONOCYTES # BLD: 0.6 K/UL (ref 0.1–1.3)
MONOCYTES NFR BLD: 7 % (ref 4–12)
NEUTS SEG # BLD: 5.8 K/UL (ref 1.7–8.2)
NEUTS SEG NFR BLD: 71 % (ref 43–78)
NRBC # BLD: 0 K/UL (ref 0–0.2)
PLATELET # BLD AUTO: 178 K/UL (ref 150–450)
PMV BLD AUTO: 10.8 FL (ref 9.4–12.3)
POTASSIUM SERPL-SCNC: 4 MMOL/L (ref 3.5–5.1)
PROT SERPL-MCNC: 5.5 G/DL (ref 6.3–8.2)
PROTHROMBIN TIME: 18.1 SEC (ref 12.5–14.7)
RBC # BLD AUTO: 3.16 M/UL (ref 4.05–5.2)
SODIUM SERPL-SCNC: 135 MMOL/L (ref 136–145)
WBC # BLD AUTO: 8.2 K/UL (ref 4.3–11.1)

## 2021-07-25 PROCEDURE — P9047 ALBUMIN (HUMAN), 25%, 50ML: HCPCS | Performed by: NURSE PRACTITIONER

## 2021-07-25 PROCEDURE — 74011250636 HC RX REV CODE- 250/636: Performed by: EMERGENCY MEDICINE

## 2021-07-25 PROCEDURE — 74011000258 HC RX REV CODE- 258: Performed by: HOSPITALIST

## 2021-07-25 PROCEDURE — 36415 COLL VENOUS BLD VENIPUNCTURE: CPT

## 2021-07-25 PROCEDURE — 85025 COMPLETE CBC W/AUTO DIFF WBC: CPT

## 2021-07-25 PROCEDURE — 74011250637 HC RX REV CODE- 250/637: Performed by: HOSPITALIST

## 2021-07-25 PROCEDURE — 80053 COMPREHEN METABOLIC PANEL: CPT

## 2021-07-25 PROCEDURE — 74011250637 HC RX REV CODE- 250/637: Performed by: FAMILY MEDICINE

## 2021-07-25 PROCEDURE — 74011250636 HC RX REV CODE- 250/636: Performed by: FAMILY MEDICINE

## 2021-07-25 PROCEDURE — 74011250637 HC RX REV CODE- 250/637: Performed by: INTERNAL MEDICINE

## 2021-07-25 PROCEDURE — 74011250636 HC RX REV CODE- 250/636: Performed by: NURSE PRACTITIONER

## 2021-07-25 PROCEDURE — 65270000029 HC RM PRIVATE

## 2021-07-25 PROCEDURE — 74011250636 HC RX REV CODE- 250/636: Performed by: HOSPITALIST

## 2021-07-25 PROCEDURE — 85610 PROTHROMBIN TIME: CPT

## 2021-07-25 PROCEDURE — P9047 ALBUMIN (HUMAN), 25%, 50ML: HCPCS | Performed by: HOSPITALIST

## 2021-07-25 PROCEDURE — 83735 ASSAY OF MAGNESIUM: CPT

## 2021-07-25 RX ORDER — AZITHROMYCIN 250 MG/1
500 TABLET, FILM COATED ORAL ONCE
Status: COMPLETED | OUTPATIENT
Start: 2021-07-25 | End: 2021-07-25

## 2021-07-25 RX ORDER — AZITHROMYCIN 250 MG/1
250 TABLET, FILM COATED ORAL DAILY
Status: DISCONTINUED | OUTPATIENT
Start: 2021-07-26 | End: 2021-07-28 | Stop reason: HOSPADM

## 2021-07-25 RX ORDER — ALBUMIN HUMAN 250 G/1000ML
25 SOLUTION INTRAVENOUS 2 TIMES DAILY
Status: COMPLETED | OUTPATIENT
Start: 2021-07-25 | End: 2021-07-25

## 2021-07-25 RX ORDER — CETIRIZINE HCL 10 MG
10 TABLET ORAL DAILY
Status: DISCONTINUED | OUTPATIENT
Start: 2021-07-26 | End: 2021-07-28 | Stop reason: HOSPADM

## 2021-07-25 RX ORDER — PROMETHAZINE HYDROCHLORIDE 25 MG/1
12.5 TABLET ORAL
Status: DISCONTINUED | OUTPATIENT
Start: 2021-07-25 | End: 2021-07-28 | Stop reason: HOSPADM

## 2021-07-25 RX ORDER — ENOXAPARIN SODIUM 100 MG/ML
40 INJECTION SUBCUTANEOUS DAILY
Status: DISCONTINUED | OUTPATIENT
Start: 2021-07-26 | End: 2021-07-28 | Stop reason: HOSPADM

## 2021-07-25 RX ORDER — BENZONATATE 100 MG/1
100 CAPSULE ORAL
Status: DISCONTINUED | OUTPATIENT
Start: 2021-07-25 | End: 2021-07-27

## 2021-07-25 RX ORDER — SODIUM CHLORIDE 0.9 % (FLUSH) 0.9 %
5-40 SYRINGE (ML) INJECTION AS NEEDED
Status: DISCONTINUED | OUTPATIENT
Start: 2021-07-25 | End: 2021-07-28 | Stop reason: HOSPADM

## 2021-07-25 RX ORDER — FLUOXETINE HYDROCHLORIDE 20 MG/1
40 CAPSULE ORAL DAILY
Status: DISCONTINUED | OUTPATIENT
Start: 2021-07-26 | End: 2021-07-28 | Stop reason: HOSPADM

## 2021-07-25 RX ORDER — MORPHINE SULFATE 4 MG/ML
4 INJECTION INTRAVENOUS
Status: DISCONTINUED | OUTPATIENT
Start: 2021-07-25 | End: 2021-07-28 | Stop reason: HOSPADM

## 2021-07-25 RX ORDER — SODIUM CHLORIDE 0.9 % (FLUSH) 0.9 %
5-40 SYRINGE (ML) INJECTION EVERY 8 HOURS
Status: DISCONTINUED | OUTPATIENT
Start: 2021-07-26 | End: 2021-07-28 | Stop reason: HOSPADM

## 2021-07-25 RX ORDER — VALACYCLOVIR HYDROCHLORIDE 500 MG/1
1000 TABLET, FILM COATED ORAL DAILY
Status: DISCONTINUED | OUTPATIENT
Start: 2021-07-26 | End: 2021-07-28 | Stop reason: HOSPADM

## 2021-07-25 RX ORDER — SENNOSIDES 8.6 MG/1
1 TABLET ORAL DAILY PRN
Status: DISCONTINUED | OUTPATIENT
Start: 2021-07-25 | End: 2021-07-28 | Stop reason: HOSPADM

## 2021-07-25 RX ORDER — ALBUMIN HUMAN 250 G/1000ML
25 SOLUTION INTRAVENOUS 2 TIMES DAILY
Status: COMPLETED | OUTPATIENT
Start: 2021-07-26 | End: 2021-07-26

## 2021-07-25 RX ORDER — AZITHROMYCIN 250 MG/1
250 TABLET, FILM COATED ORAL DAILY
Status: DISCONTINUED | OUTPATIENT
Start: 2021-07-26 | End: 2021-07-25

## 2021-07-25 RX ORDER — LIDOCAINE 4 G/100G
1 PATCH TOPICAL EVERY 24 HOURS
Status: DISCONTINUED | OUTPATIENT
Start: 2021-07-26 | End: 2021-07-28 | Stop reason: HOSPADM

## 2021-07-25 RX ORDER — SPIRONOLACTONE 25 MG/1
50 TABLET ORAL DAILY
Status: DISCONTINUED | OUTPATIENT
Start: 2021-07-26 | End: 2021-07-28 | Stop reason: HOSPADM

## 2021-07-25 RX ORDER — ONDANSETRON 2 MG/ML
4 INJECTION INTRAMUSCULAR; INTRAVENOUS
Status: DISCONTINUED | OUTPATIENT
Start: 2021-07-25 | End: 2021-07-28 | Stop reason: HOSPADM

## 2021-07-25 RX ORDER — PANTOPRAZOLE SODIUM 40 MG/1
40 TABLET, DELAYED RELEASE ORAL
Status: DISCONTINUED | OUTPATIENT
Start: 2021-07-26 | End: 2021-07-28 | Stop reason: HOSPADM

## 2021-07-25 RX ORDER — FUROSEMIDE 20 MG/1
20 TABLET ORAL DAILY
Status: DISCONTINUED | OUTPATIENT
Start: 2021-07-26 | End: 2021-07-28 | Stop reason: HOSPADM

## 2021-07-25 RX ORDER — ALBUMIN HUMAN 250 G/1000ML
25 SOLUTION INTRAVENOUS 2 TIMES DAILY
Status: DISCONTINUED | OUTPATIENT
Start: 2021-07-25 | End: 2021-07-25

## 2021-07-25 RX ADMIN — ALBUMIN (HUMAN) 25 G: 0.25 INJECTION, SOLUTION INTRAVENOUS at 18:08

## 2021-07-25 RX ADMIN — BENZONATATE 100 MG: 100 CAPSULE ORAL at 16:56

## 2021-07-25 RX ADMIN — ONDANSETRON 4 MG: 2 INJECTION INTRAMUSCULAR; INTRAVENOUS at 18:04

## 2021-07-25 RX ADMIN — ALBUMIN (HUMAN) 12.5 G: 0.25 INJECTION, SOLUTION INTRAVENOUS at 05:50

## 2021-07-25 RX ADMIN — MORPHINE SULFATE 4 MG: 4 INJECTION INTRAVENOUS at 16:49

## 2021-07-25 RX ADMIN — Medication 10 ML: at 06:00

## 2021-07-25 RX ADMIN — FUROSEMIDE 20 MG: 20 TABLET ORAL at 08:19

## 2021-07-25 RX ADMIN — CEFTRIAXONE 1 G: 1 INJECTION, POWDER, FOR SOLUTION INTRAMUSCULAR; INTRAVENOUS at 11:43

## 2021-07-25 RX ADMIN — SODIUM CHLORIDE 200 ML/HR: 900 INJECTION, SOLUTION INTRAVENOUS at 02:50

## 2021-07-25 RX ADMIN — ALBUMIN (HUMAN) 12.5 G: 0.25 INJECTION, SOLUTION INTRAVENOUS at 00:13

## 2021-07-25 RX ADMIN — SODIUM CHLORIDE 200 ML/HR: 900 INJECTION, SOLUTION INTRAVENOUS at 08:22

## 2021-07-25 RX ADMIN — CETIRIZINE HYDROCHLORIDE 10 MG: 10 TABLET, FILM COATED ORAL at 08:19

## 2021-07-25 RX ADMIN — BENZONATATE 100 MG: 100 CAPSULE ORAL at 00:22

## 2021-07-25 RX ADMIN — BENZONATATE 100 MG: 100 CAPSULE ORAL at 08:19

## 2021-07-25 RX ADMIN — AZITHROMYCIN MONOHYDRATE 500 MG: 250 TABLET ORAL at 11:42

## 2021-07-25 RX ADMIN — PANTOPRAZOLE SODIUM 40 MG: 40 TABLET, DELAYED RELEASE ORAL at 08:19

## 2021-07-25 RX ADMIN — Medication 10 ML: at 21:35

## 2021-07-25 RX ADMIN — PHYTONADIONE 10 MG: 10 INJECTION, EMULSION INTRAMUSCULAR; INTRAVENOUS; SUBCUTANEOUS at 16:52

## 2021-07-25 RX ADMIN — FLUOXETINE 40 MG: 20 CAPSULE ORAL at 08:19

## 2021-07-25 RX ADMIN — VALACYCLOVIR HYDROCHLORIDE 1000 MG: 500 TABLET, FILM COATED ORAL at 08:19

## 2021-07-25 RX ADMIN — SPIRONOLACTONE 50 MG: 25 TABLET ORAL at 08:19

## 2021-07-25 RX ADMIN — ALBUMIN (HUMAN) 25 G: 0.25 INJECTION, SOLUTION INTRAVENOUS at 11:43

## 2021-07-25 NOTE — PROGRESS NOTES
[facility] Progress Note         Admit Date:  7/23/2021    Today's Date:  7/25/2021    CC:  Ascites, Cirrhosis     Subjective:     Patient reports LLQ pain with coughing. Reports early satiety. Denies any nausea/vomiting. No BM overnight. Medications:   Current Facility-Administered Medications   Medication Dose Route Frequency    albumin human 25% (BUMINATE) solution 25 g  25 g IntraVENous BID    cetirizine (ZYRTEC) tablet 10 mg  10 mg Oral DAILY    lidocaine 4 % patch 1 Patch  1 Patch TransDERmal Q24H    pantoprazole (PROTONIX) tablet 40 mg  40 mg Oral DAILY    valACYclovir (VALTREX) tablet 1,000 mg  1,000 mg Oral DAILY    sodium chloride (NS) flush 5-40 mL  5-40 mL IntraVENous Q8H    sodium chloride (NS) flush 5-40 mL  5-40 mL IntraVENous PRN    promethazine (PHENERGAN) tablet 12.5 mg  12.5 mg Oral Q6H PRN    Or    ondansetron (ZOFRAN) injection 4 mg  4 mg IntraVENous Q6H PRN    enoxaparin (LOVENOX) injection 40 mg  40 mg SubCUTAneous DAILY    senna (SENOKOT) tablet 8.6 mg  1 Tablet Oral DAILY PRN    [Held by provider] spironolactone (ALDACTONE) tablet 50 mg  50 mg Oral DAILY    FLUoxetine (PROzac) capsule 40 mg  40 mg Oral DAILY    furosemide (LASIX) tablet 20 mg  20 mg Oral DAILY    benzonatate (TESSALON) capsule 100 mg  100 mg Oral TID PRN    morphine injection 4 mg  4 mg IntraVENous Q4H PRN       Review of Systems:  ROS was obtained, with pertinent positives as listed above. No chest pain or SOB. Diet:      Objective:   Vitals:  Visit Vitals  BP 93/61 (BP 1 Location: Left upper arm, BP Patient Position: At rest;Lying)   Pulse 80   Temp 98.5 °F (36.9 °C)   Resp 19   Ht 5' 1\" (1.549 m)   Wt 55.5 kg (122 lb 5.7 oz)   SpO2 91%   Breastfeeding No   BMI 23.12 kg/m²     Intake/Output:  No intake/output data recorded. No intake/output data recorded.   Exam:  General appearance: alert, cooperative, no distress  Lungs: clear to auscultation bilaterally anteriorly  Heart: regular rate and rhythm  Abdomen: soft, non-tender. Bowel sounds normal. No masses, no organomegaly  Extremities: extremities normal, atraumatic, no cyanosis or edema  Neuro:  alert and oriented    Data Review (Labs):    Recent Labs     07/25/21  0514 07/24/21  0620 07/23/21  2314 07/23/21 2008   WBC 8.2 10.3  --  13.1*   HGB 9.5* 10.4*  --  11.7   HCT 27.5* 29.4*  --  34.3*    198  --  256   MCV 87.0 85.2  --  87.3   * 131*  --  127*   K 4.0 3.9  --  3.4*    100  --  96*   CO2 24 23  --  23   BUN 6 9  --  9   CREA 0.53* 0.60  --  0.93   CA 7.7* 8.0*  --  8.8   MG 2.0  --   --  1.9   GLU 86 83  --  94   * 161*  --  214*   * 100*  --  131*   ALT 53 62  --  77*   TBILI 2.7* 3.0*  --  3.7*   CBIL  --   --   --  1.4*   ALB 2.8* 2.7*  --  3.3*   TP 5.5* 5.6*  --  7.1   PTP 18.1*  --  17.1*  --    INR 1.4  --  1.3  --        Assessment:     Principal Problem:    Liver failure without hepatic coma (HCC) (7/23/2021)    Active Problems:    Cirrhosis of liver with ascites ([address] (7/24/2021)      Patient is a 36 y.o. female with PMH including but not limited to GERD, Cryptogenic Cirrhosis, Ascites, Esophageal Varices who is seen in consultation at the request of Dr. Akua Valdez for cirrhosis and ascites. Patient has been followed by Dr. Case Root and Franca Lamb. Has an appt with [facility] coming up. She has had complete liver serology work up and Liver biopsy. S/P Paracentesis on 7/2/2021 with 1.8L removed.      TB 3.0, , ALT 62, , ammonia 27, Na 131, WBC 10.3, HGB 10.4, PlT 198, Creatinine 0.6. INR 1.3. MELD: 14, MELD-NA: 19. Blood cultures and Urine culture with no growth to date. She reports coughing up clear mucus. CXR showed right lower lobe atelectasis or infiltrate and small pleural effusion, new from prior CT on 6/26/2021.          7/25: TB 2.7, , ALT 53, . INR 1.4. Having abdominal pain with coughing. Plan:     Continue with Aldactone 50 and lasix 20.  Received albumin today.  Follow labs  Paracentesis with fluid studies will be performed tomorrow  Continue with plans for evaluation at [facility] in September  On tessalon pearls for cough but not improving       Belkys Maria NP    Patient is seen and examined in collaboration with Dr. Jessica Bartlett. .  Assessment and plan as per Dr. Jessica Bartlett.

## 2021-07-25 NOTE — PROGRESS NOTES
Patient reported 4 out of 10 pain only when coughing. Given benzonatate cap 100mg per MD order. Will reassess.

## 2021-07-25 NOTE — PROGRESS NOTES
Comprehensive Nutrition Assessment    Type and Reason for Visit: Initial, Positive nutrition screen  Best Practice Alert for Malnutrition Screening Tool: Recently Lost Weight Without Trying: Yes, If Yes, How Much Weight Loss: 2-13 lbs, Eating Poorly Due to Decreased Appetite: Yes    Nutrition Recommendations/Plan:    Continue with current diet   Discontinue Ensure Enlive per pt request   Start Ensure clear with meals     Malnutrition Assessment:  Malnutrition Status: At risk for malnutrition (specify)    Nutrition Assessment:   Nutrition History: Patient reports low appetite for 2 months. She states she does experience nausea and vomiting on and off. She thinks she has lost about 10 lbs in those 2 months. Cultural/Worship/Ethnic Food Preference(s): None   Nutrition Background: Patient with liver failure without hepatic coma, cirrhosis of liver with ascites. Patient presented 2 weeks ago for paracentesis, and has return due to continued abdominal distention. Plan for paracentesis Monday  Daily Update:  Patient report that since liver failure started 2 months ago having poor po and inability to eat much. She reports that a variety of barriers affect her, nausea and vomiting, abdominal  Pain, type of food. She thinks she has lost about 10 lbs however no weight loss history per EMR. Patient reports not drinking ensure enlive and would like to avoid unless needed. However patient willing to try ensure clear as it sounds better, also would like to try magic cup once. Nutrition Related Findings:   No physical findings of malnutrition      Current Nutrition Therapies:  ADULT ORAL NUTRITION SUPPLEMENT Breakfast, Lunch, Dinner; Standard High Calorie/High Protein  ADULT DIET Regular;  Low Sodium (2 gm)    Current Intake:   Average Meal Intake: Unable to assess Average Supplement Intake: Refusing to take      Anthropometric Measures:  Height: 5' 1\" (154.9 cm)  Current Body Wt: 55.5 kg (122 lb 5.7 oz), Weight source: Bed scale  BMI: 23.1, Normal weight (BMI 18.5-24. 9)     Ideal Body Weight (lbs) (Calculated): 105 lbs (48 kg), 116.5 %  Usual Body Wt:  , Percent weight change:            Edema: No data recorded   Estimated Daily Nutrient Needs:  Energy (kcal/day): 2622-3199 (Kcal/kg (25-30), Weight Used: Current (55.5 kg))  Protein (g/day): 55-67 (1-1.2 g/kg) Weight Used: (Current)  Fluid (ml/day):   (1 ml/kcal)    Nutrition Diagnosis:   · Inadequate oral intake related to  (liver failure, ascites) as evidenced by poor intake prior to admission, weight loss, vomiting, nausea    Nutrition Interventions:   Food and/or Nutrient Delivery: Continue current diet, Modify oral nutrition supplement     Coordination of Nutrition Care: Continue to monitor while inpatient    Goals: Active Goal: Intake >75% of nutritional needs within 7 days    Nutrition Monitoring and Evaluation:      Food/Nutrient Intake Outcomes: Food and nutrient intake, Supplement intake  Physical Signs/Symptoms Outcomes: Nausea/vomiting, GI status, Biochemical data    Discharge Planning:     Too soon to determine    Electronically signed by Petra Okeefe MS, RD, LD on 7/25/2021 at 10:06 AM.  Contact: [phone]

## 2021-07-25 NOTE — PROGRESS NOTES
Hospitalist Progress Note     Name:  Twan Dunne  Age:40 y.o. Sex:female   :  1980    MRN:  [id number]   Admit Date:  2021    Presenting Complaint: Jaundice and Fatigue    Initial Admission Diagnosis: Liver failure without hepatic coma [facility]) [K72.90]     Hospital Course/Interval history: This is a 36year old CF PMH stage IV liver cirrhosis with last MELD score 18, depression admitted  for ascites. Last paracentesis 2 weeks ago, she has an upcoming appointment at [facility] for transplant options. Patient was evaluated by GI. Plans for paracentesis tomorrow in the ER. Ascitic fluid analysis, cell count cultures to be sent. She has cough and chest x-ray on admission shows right lower lobe infiltrate. Patient started on ceftriaxone and azithromycin for possible pneumonia. Subjective (21):  Patient complains of cough and abdominal pain with coughing. No fever no chills. No chest pain no palpitations. Patient reports that her blood pressure is always low average 90/60 to sometimes dropping to 85/50. Assessment & Plan     1. 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 Appreciate res. Plan for repeat paracentesis in a.m. in IR. Patient will also follow-up with evaluation for transplant at [facility] in September. INR is 1.4.    2. Suspected community-acquired pneumonia  Start ceftriaxone and azithromycin. 3. Depression:  stable  Home medications    Dispo/Discharge Planning: Hopefully home in 48 hours. Diet:  ADULT DIET Regular;  Low Sodium (2 gm)  ADULT ORAL NUTRITION SUPPLEMENT Breakfast, Dinner; Clear Liquid  ADULT ORAL NUTRITION SUPPLEMENT Lunch; Frozen Supplement  DVT PPx: Lovenox SQ  Code status: Full Code    Objective:     Patient Vitals for the past 24 hrs:   Temp Pulse Resp BP SpO2   21 1030 98 °F (36.7 °C) 86 19 94/63 92 %   07/25/21 0700 98.5 °F (36.9 °C) 80 19 93/61 91 %   07/25/21 0313 98.2 °F (36.8 °C) 88 18 (!) 99/57 90 %   07/24/21 2332 98.8 °F (37.1 °C) 84 18 (!) 103/59 95 %   07/24/21 2023 98.9 °F (37.2 °C) 84 20 112/64 94 %   07/24/21 1520 98 °F (36.7 °C) 87 20 (!) 93/54 96 %     Oxygen Therapy  O2 Sat (%): 92 % (07/25/21 1030)  Pulse via Oximetry: 89 beats per minute (07/24/21 0020)  O2 Device: None (Room air) (07/24/21 1901)    Body mass index is 23.12 kg/m². Physical Exam:   General:  No acute distress, speaking in full sentences, no use of accessory muscles   Lungs:  Clear to auscultation bilaterally   CV:  Regular rate and rhythm with normal S1 and S2   Abdomen:  Soft, distended, nontender, normoactive bowel sounds, no organomegaly  Extremities:  No cyanosis clubbing or edema   Neuro:  Nonfocal, A&O x3   Psych:  Normal affect     Data Review:  I have reviewed all labs, meds, and studies from the last 24 hours:    Labs:    Recent Results (from the past 24 hour(s))   MAGNESIUM    Collection Time: 07/25/21  5:14 AM   Result Value Ref Range    Magnesium 2.0 1.8 - 2.4 mg/dL   CBC WITH AUTOMATED DIFF    Collection Time: 07/25/21  5:14 AM   Result Value Ref Range    WBC 8.2 4.3 - 11.1 K/uL    RBC 3.16 (L) 4.05 - 5.2 M/uL    HGB 9.5 (L) 11.7 - 15.4 g/dL    HCT 27.5 (L) 35.8 - 46.3 %    MCV 87.0 79.6 - 97.8 FL    MCH 30.1 26.1 - 32.9 PG    MCHC 34.5 31.4 - 35.0 g/dL    RDW 19.5 (H) 11.9 - 14.6 %    PLATELET 248 573 - 893 K/uL    MPV 10.8 9.4 - 12.3 FL    ABSOLUTE NRBC 0.00 0.0 - 0.2 K/uL    DF AUTOMATED      NEUTROPHILS 71 43 - 78 %    LYMPHOCYTES 18 13 - 44 %    MONOCYTES 7 4.0 - 12.0 %    EOSINOPHILS 3 0.5 - 7.8 %    BASOPHILS 0 0.0 - 2.0 %    IMMATURE GRANULOCYTES 0 0.0 - 5.0 %    ABS. NEUTROPHILS 5.8 1.7 - 8.2 K/UL    ABS. LYMPHOCYTES 1.5 0.5 - 4.6 K/UL    ABS. MONOCYTES 0.6 0.1 - 1.3 K/UL    ABS. EOSINOPHILS 0.2 0.0 - 0.8 K/UL    ABS. BASOPHILS 0.0 0.0 - 0.2 K/UL    ABS. IMM.  GRANS. 0.0 0.0 - 0.5 K/UL   METABOLIC PANEL, COMPREHENSIVE    Collection Time: 07/25/21  5:14 AM   Result Value Ref Range    Sodium 135 (L) 136 - 145 mmol/L    Potassium 4.0 3.5 - 5.1 mmol/L    Chloride 106 98 - 107 mmol/L    CO2 24 21 - 32 mmol/L    Anion gap 5 (L) 7 - 16 mmol/L    Glucose 86 65 - 100 mg/dL    BUN 6 6 - 23 MG/DL    Creatinine 0.53 (L) 0.6 - 1.0 MG/DL    GFR est AA >60 >60 ml/min/1.73m2    GFR est non-AA >60 >60 ml/min/1.73m2    Calcium 7.7 (L) 8.3 - 10.4 MG/DL    Bilirubin, total 2.7 (H) 0.2 - 1.1 MG/DL    ALT (SGPT) 53 12 - 65 U/L    AST (SGOT) 100 (H) 15 - 37 U/L    Alk. phosphatase 138 (H) 50 - 130 U/L    Protein, total 5.5 (L) 6.3 - 8.2 g/dL    Albumin 2.8 (L) 3.5 - 5.0 g/dL    Globulin 2.7 2.3 - 3.5 g/dL    A-G Ratio 1.0 (L) 1.2 - 3.5     PROTHROMBIN TIME + INR    Collection Time: 07/25/21  5:14 AM   Result Value Ref Range    Prothrombin time 18.1 (H) 12.5 - 14.7 sec    INR 1.4         All Micro Results     Procedure Component Value Units Date/Time    CULTURE, URINE [[id number]] Collected: 07/24/21 0243    Order Status: Completed Specimen: Urine from Clean catch Updated: 07/25/21 0848     Special Requests: NO SPECIAL REQUESTS        Culture result: NO GROWTH 1 DAY       CULTURE, BLOOD [[id number]] Collected: 07/23/21 2314    Order Status: Completed Specimen: Blood Updated: 07/25/21 0734     Special Requests: --        RIGHT  Antecubital       Culture result: NO GROWTH 2 DAYS       CULTURE, BLOOD [[id number]] Collected: 07/23/21 2336    Order Status: Completed Specimen: Blood Updated: 07/25/21 0734     Special Requests: --        NO SPECIAL REQUESTS  LEFT  HAND       Culture result: NO GROWTH 2 DAYS             EKG Results     None          Other Studies:  No results found.     Current Meds:   Current Facility-Administered Medications   Medication Dose Route Frequency    albumin human 25% (BUMINATE) solution 25 g  25 g IntraVENous BID    cefTRIAXone (ROCEPHIN) 1 g in 0.9% sodium chloride (MBP/ADV) 50 mL MBP  1 g IntraVENous Q24H    [START ON 7/26/2021] azithromycin (ZITHROMAX) tablet 250 mg  250 mg Oral DAILY    cetirizine (ZYRTEC) tablet 10 mg  10 mg Oral DAILY    lidocaine 4 % patch 1 Patch  1 Patch TransDERmal Q24H    pantoprazole (PROTONIX) tablet 40 mg  40 mg Oral DAILY    valACYclovir (VALTREX) tablet 1,000 mg  1,000 mg Oral DAILY    sodium chloride (NS) flush 5-40 mL  5-40 mL IntraVENous Q8H    sodium chloride (NS) flush 5-40 mL  5-40 mL IntraVENous PRN    promethazine (PHENERGAN) tablet 12.5 mg  12.5 mg Oral Q6H PRN    Or    ondansetron (ZOFRAN) injection 4 mg  4 mg IntraVENous Q6H PRN    enoxaparin (LOVENOX) injection 40 mg  40 mg SubCUTAneous DAILY    senna (SENOKOT) tablet 8.6 mg  1 Tablet Oral DAILY PRN    spironolactone (ALDACTONE) tablet 50 mg  50 mg Oral DAILY    FLUoxetine (PROzac) capsule 40 mg  40 mg Oral DAILY    furosemide (LASIX) tablet 20 mg  20 mg Oral DAILY    benzonatate (TESSALON) capsule 100 mg  100 mg Oral TID PRN    morphine injection 4 mg  4 mg IntraVENous Q4H PRN       Problem List:  Hospital Problems as of 7/25/2021 Never Reviewed        Codes Class Noted - Resolved POA    Cirrhosis of liver with ascites (HCC) (Chronic) ICD-10-CM: K74.60, R18.8  ICD-9-CM: 571.5  7/24/2021 - Present Yes        * (Principal) Liver failure without hepatic coma (HCC) (Chronic) ICD-10-CM: K72.90  ICD-9-CM: 572.8  7/23/2021 - Present Yes                 Signed By: Lamont Spann MD   [address] [place], [place] [address]ist Service    July 25, 2021

## 2021-07-25 NOTE — PROGRESS NOTES
Patient discussed during rounds today. Plan is to discharge Tuesday. Patient will follow up with [facility] regarding transplant options.      Maggy Beasley LMSW    [facility]    * [email or web address]

## 2021-07-25 NOTE — PROGRESS NOTES
Problem: Falls - Risk of  Goal: *Absence of Falls  Description: Document Nat Redd Fall Risk and appropriate interventions in the flowsheet.   Outcome: Progressing Towards Goal  Note: Fall Risk Interventions:            Medication Interventions: Patient to call before getting OOB                   Problem: Patient Education: Go to Patient Education Activity  Goal: Patient/Family Education  Outcome: Progressing Towards Goal

## 2021-07-25 NOTE — PROGRESS NOTES
Chart screened by  for discharge planning and patient discussed during round. No needs identified at this time. Please consult  if any new issues arise.     Chantal Vieyra LMSW    [facility]    * [email or web address]

## 2021-07-26 ENCOUNTER — APPOINTMENT (OUTPATIENT)
Dept: ULTRASOUND IMAGING | Age: 41
DRG: 432 | End: 2021-07-26
Attending: PHYSICIAN ASSISTANT
Payer: COMMERCIAL

## 2021-07-26 LAB
ALBUMIN SERPL-MCNC: 3.7 G/DL (ref 3.5–5)
ALBUMIN/GLOB SERPL: 1.7 {RATIO} (ref 1.2–3.5)
ALP SERPL-CCNC: 128 U/L (ref 50–130)
ALT SERPL-CCNC: 52 U/L (ref 12–65)
ANION GAP SERPL CALC-SCNC: 9 MMOL/L (ref 7–16)
AST SERPL-CCNC: 96 U/L (ref 15–37)
BACTERIA SPEC CULT: NORMAL
BASOPHILS # BLD: 0.1 K/UL (ref 0–0.2)
BASOPHILS NFR BLD: 0 % (ref 0–2)
BILIRUB SERPL-MCNC: 3.4 MG/DL (ref 0.2–1.1)
BUN SERPL-MCNC: 6 MG/DL (ref 6–23)
CALCIUM SERPL-MCNC: 8.5 MG/DL (ref 8.3–10.4)
CHLORIDE SERPL-SCNC: 100 MMOL/L (ref 98–107)
CO2 SERPL-SCNC: 24 MMOL/L (ref 21–32)
CREAT SERPL-MCNC: 0.58 MG/DL (ref 0.6–1)
DIFFERENTIAL METHOD BLD: ABNORMAL
EOSINOPHIL # BLD: 0.4 K/UL (ref 0–0.8)
EOSINOPHIL NFR BLD: 3 % (ref 0.5–7.8)
ERYTHROCYTE [DISTWIDTH] IN BLOOD BY AUTOMATED COUNT: 19.9 % (ref 11.9–14.6)
GLOBULIN SER CALC-MCNC: 2.2 G/DL (ref 2.3–3.5)
GLUCOSE SERPL-MCNC: 80 MG/DL (ref 65–100)
HCT VFR BLD AUTO: 29 % (ref 35.8–46.3)
HGB BLD-MCNC: 9.9 G/DL (ref 11.7–15.4)
IMM GRANULOCYTES # BLD AUTO: 0.1 K/UL (ref 0–0.5)
IMM GRANULOCYTES NFR BLD AUTO: 1 % (ref 0–5)
INR PPP: 1.5
LYMPHOCYTES # BLD: 2.3 K/UL (ref 0.5–4.6)
LYMPHOCYTES NFR BLD: 16 % (ref 13–44)
MCH RBC QN AUTO: 29.6 PG (ref 26.1–32.9)
MCHC RBC AUTO-ENTMCNC: 34.1 G/DL (ref 31.4–35)
MCV RBC AUTO: 86.8 FL (ref 79.6–97.8)
MONOCYTES # BLD: 0.7 K/UL (ref 0.1–1.3)
MONOCYTES NFR BLD: 5 % (ref 4–12)
NEUTS SEG # BLD: 10.7 K/UL (ref 1.7–8.2)
NEUTS SEG NFR BLD: 76 % (ref 43–78)
NRBC # BLD: 0 K/UL (ref 0–0.2)
PLATELET # BLD AUTO: 204 K/UL (ref 150–450)
PMV BLD AUTO: 11.3 FL (ref 9.4–12.3)
POTASSIUM SERPL-SCNC: 3.7 MMOL/L (ref 3.5–5.1)
PROT SERPL-MCNC: 5.9 G/DL (ref 6.3–8.2)
PROTHROMBIN TIME: 18.6 SEC (ref 12.5–14.7)
RBC # BLD AUTO: 3.34 M/UL (ref 4.05–5.2)
SERVICE CMNT-IMP: NORMAL
SODIUM SERPL-SCNC: 133 MMOL/L (ref 136–145)
WBC # BLD AUTO: 14.1 K/UL (ref 4.3–11.1)

## 2021-07-26 PROCEDURE — 74011250636 HC RX REV CODE- 250/636: Performed by: HOSPITALIST

## 2021-07-26 PROCEDURE — 74011000258 HC RX REV CODE- 258: Performed by: HOSPITALIST

## 2021-07-26 PROCEDURE — 80053 COMPREHEN METABOLIC PANEL: CPT

## 2021-07-26 PROCEDURE — 74011250637 HC RX REV CODE- 250/637: Performed by: HOSPITALIST

## 2021-07-26 PROCEDURE — 76705 ECHO EXAM OF ABDOMEN: CPT

## 2021-07-26 PROCEDURE — 36415 COLL VENOUS BLD VENIPUNCTURE: CPT

## 2021-07-26 PROCEDURE — 85610 PROTHROMBIN TIME: CPT

## 2021-07-26 PROCEDURE — 85025 COMPLETE CBC W/AUTO DIFF WBC: CPT

## 2021-07-26 PROCEDURE — 65270000029 HC RM PRIVATE

## 2021-07-26 PROCEDURE — 2709999900 HC NON-CHARGEABLE SUPPLY

## 2021-07-26 PROCEDURE — P9047 ALBUMIN (HUMAN), 25%, 50ML: HCPCS | Performed by: HOSPITALIST

## 2021-07-26 RX ADMIN — AZITHROMYCIN MONOHYDRATE 250 MG: 250 TABLET ORAL at 09:43

## 2021-07-26 RX ADMIN — ALBUMIN (HUMAN) 25 G: 0.25 INJECTION, SOLUTION INTRAVENOUS at 09:35

## 2021-07-26 RX ADMIN — PANTOPRAZOLE SODIUM 40 MG: 40 TABLET, DELAYED RELEASE ORAL at 05:55

## 2021-07-26 RX ADMIN — Medication 10 ML: at 05:57

## 2021-07-26 RX ADMIN — VALACYCLOVIR HYDROCHLORIDE 1000 MG: 500 TABLET, FILM COATED ORAL at 09:44

## 2021-07-26 RX ADMIN — CETIRIZINE HYDROCHLORIDE 10 MG: 10 TABLET, FILM COATED ORAL at 09:43

## 2021-07-26 RX ADMIN — Medication 5 ML: at 22:32

## 2021-07-26 RX ADMIN — SPIRONOLACTONE 50 MG: 25 TABLET ORAL at 09:43

## 2021-07-26 RX ADMIN — CEFTRIAXONE 1 G: 1 INJECTION, POWDER, FOR SOLUTION INTRAMUSCULAR; INTRAVENOUS at 12:02

## 2021-07-26 RX ADMIN — FUROSEMIDE 20 MG: 20 TABLET ORAL at 09:43

## 2021-07-26 RX ADMIN — FLUOXETINE 40 MG: 20 CAPSULE ORAL at 09:44

## 2021-07-26 NOTE — PROGRESS NOTES
Problem: Falls - Risk of  Goal: *Absence of Falls  Description: Document Veronique Salvador Fall Risk and appropriate interventions in the flowsheet.   Outcome: Progressing Towards Goal  Note: Fall Risk Interventions:            Medication Interventions: Patient to call before getting OOB                   Problem: Patient Education: Go to Patient Education Activity  Goal: Patient/Family Education  Outcome: Progressing Towards Goal     Problem: Nutrition Deficit  Goal: *Optimize nutritional status  Outcome: Progressing Towards Goal

## 2021-07-26 NOTE — PROGRESS NOTES
[facility] Progress Note         Admit Date:  7/23/2021    Today's Date:  7/26/2021    CC:  Cirrhosis/ascites    Subjective:     Patient feeling better today. Just found out she has an appointment with [facility] in one week. Only has abdominal pain when she coughs. Still coughing some--more so when talking. Nonproductive. Last BM was two days ago and loose. No blood/melena. Unable to tolerate high dose diuretics because of hypotension. Medications:   Current Facility-Administered Medications   Medication Dose Route Frequency    azithromycin (ZITHROMAX) tablet 250 mg  250 mg Oral DAILY    cefTRIAXone (ROCEPHIN) 1 g in 0.9% sodium chloride (MBP/ADV) 50 mL MBP  1 g IntraVENous Q24H    cetirizine (ZYRTEC) tablet 10 mg  10 mg Oral DAILY    enoxaparin (LOVENOX) injection 40 mg  40 mg SubCUTAneous DAILY    FLUoxetine (PROzac) capsule 40 mg  40 mg Oral DAILY    furosemide (LASIX) tablet 20 mg  20 mg Oral DAILY    lidocaine 4 % patch 1 Patch  1 Patch TransDERmal Q24H    pantoprazole (PROTONIX) tablet 40 mg  40 mg Oral ACB    sodium chloride (NS) flush 5-40 mL  5-40 mL IntraVENous Q8H    spironolactone (ALDACTONE) tablet 50 mg  50 mg Oral DAILY    valACYclovir (VALTREX) tablet 1,000 mg  1,000 mg Oral DAILY    benzonatate (TESSALON) capsule 100 mg  100 mg Oral TID PRN    morphine injection 4 mg  4 mg IntraVENous Q4H PRN    promethazine (PHENERGAN) tablet 12.5 mg  12.5 mg Oral Q6H PRN    Or    ondansetron (ZOFRAN) injection 4 mg  4 mg IntraVENous Q6H PRN    senna (SENOKOT) tablet 8.6 mg  1 Tablet Oral DAILY PRN    sodium chloride (NS) flush 5-40 mL  5-40 mL IntraVENous PRN       Review of Systems:  ROS was obtained, with pertinent positives as listed above. No chest pain or SOB.     Diet:  2 gram sodium    Objective:   Vitals:  Visit Vitals  /62 (BP 1 Location: Right upper arm, BP Patient Position: At rest)   Pulse 85   Temp 98.9 °F (37.2 °C)   Resp 19   Ht 5' 1\" (1.549 m)   Wt 55.5 kg (122 lb 5.7 oz)   SpO2 93%   Breastfeeding No   BMI 23.12 kg/m²     Intake/Output:  No intake/output data recorded. No intake/output data recorded. Exam:  General appearance: alert, cooperative, no distress; jaundiced; spider angiomata of the chest  Lungs: clear to auscultation bilaterally anteriorly  Heart: regular rate and rhythm  Abdomen: soft, moderately distended but not tender. Bowel sounds normal. No masses, no organomegaly  Extremities: extremities normal, atraumatic, no cyanosis or edema  Neuro:  alert and oriented    Data Review (Labs):    Recent Labs     07/26/21  0524 07/25/21  0514 07/24/21  0620 07/23/21  2314 07/23/21 2008   WBC 14.1* 8.2 10.3  --  13.1*   HGB 9.9* 9.5* 10.4*  --  11.7   HCT 29.0* 27.5* 29.4*  --  34.3*    178 198  --  256   MCV 86.8 87.0 85.2  --  87.3   * 135* 131*  --  127*   K 3.7 4.0 3.9  --  3.4*    106 100  --  96*   CO2 24 24 23  --  23   BUN 6 6 9  --  9   CREA 0.58* 0.53* 0.60  --  0.93   CA 8.5 7.7* 8.0*  --  8.8   MG  --  2.0  --   --  1.9   GLU 80 86 83  --  94    138* 161*  --  214*   AST 96* 100* 100*  --  131*   ALT 52 53 62  --  77*   TBILI 3.4* 2.7* 3.0*  --  3.7*   CBIL  --   --   --   --  1.4*   ALB 3.7 2.8* 2.7*  --  3.3*   TP 5.9* 5.5* 5.6*  --  7.1   PTP 18.6* 18.1*  --  17.1*  --    INR 1.5 1.4  --  1.3  --        Assessment:     Principal Problem:    Liver failure without hepatic coma (HCC) (7/23/2021)    Active Problems:    Cirrhosis of liver with ascites ([address] (7/24/2021)      Patient is a 40 y.o. female patient of Dr. Kelli Zepeda (and also sees Neil Zepeda) with PMH including but not limited to GERD, Cryptogenic Cirrhosis, Mitali Lindsey is seen in consultation at the request of Dr. Adkins Grade and ascites. She has had complete liver serology work up and Liver biopsy.  S/P Paracentesis on 7/2/2021 with 1.8L removed.  Now with recurrent abdominal distention and unable to tolerate higher dose diuretics secondary to hypotension. MELD Na 19   Plan:   1) limited abdominal ultrasound ordered by IR to see if there is enough ascites to perform paracentesis (suspect there will be based on PE)  2) paracentesis for diagnostic and therapeutics if appropriate (per IR). Fluid to be sent for albumin, cell count, diff and protein)  3) 2 gram sodium diet  4) could probably stop lovenox as patient is auto-anticoagulated  5) has appointment to be seen at [facility] in one week   Patient is seen and examined in collaboration with Dr. Jory Mathews. Assessment and plan as per Dr. Leana Salguero. LOREN Montanez    GI-addendum--Patient seen and examined. Agree with above. U/S and physical exam shows obvious ascites. For paracentesis in the AM.  Patient has headaches when she takes Midodrine. Long acting Sandostatin may be worth a try (pending Spencer's input) to allow a higher dose of diuretic to be given. For paracentesis tomorrow. Will order a PA/lat CXR for tomorrow.   Thanks Rosa Mckeon MD

## 2021-07-26 NOTE — PROGRESS NOTES
Hospitalist Progress Note     Name:  Melvi Weir  Age:40 y.o. Sex:female   :  1980    MRN:  [id number]   Admit Date:  2021    Presenting Complaint: Jaundice and Fatigue    Initial Admission Diagnosis: Liver failure without hepatic coma [facility]) [K72.90]     Hospital Course/Interval history: This is a 36year old CF PMH stage IV liver cirrhosis with last MELD score 18, depression admitted  for ascites. Last paracentesis 2 weeks ago, she has an upcoming appointment at [facility] for transplant options. Patient was evaluated by GI. Plans for paracentesis tomorrow in the ER. Ascitic fluid analysis, cell count cultures to be sent. She has cough and chest x-ray on admission shows right lower lobe infiltrate. Patient started on ceftriaxone and azithromycin for possible pneumonia. Subjective (21): Patient reports feeling better this morning. Still has cough and abdominal pain with coughing. No fever no chills. Blood pressure improved. No nausea no vomiting. Ultrasound abdomen shows at least moderate volume ascites. Plan for IR guided paracentesis tomorrow. Assessment & Plan     1. 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 Appreciate res. Plan for repeat paracentesis in a.m. in IR. Patient will also follow-up with evaluation for transplant at [facility] in September. INR is 1.4.   INR is 1.5. Ultrasound abdomen shows at least moderate volume ascites. Plan for IR guided paracentesis tomorrow. 2. Suspected community-acquired pneumonia   Start ceftriaxone and azithromycin.  Day 2 Ceftriaxone, Azithromycin. 3. Depression:  stable  Home medications    Dispo/Discharge Planning: Hopefully home in 48 hours if pt continues to improve. Diet:  ADULT DIET Regular;  Low Sodium (2 gm)  ADULT ORAL NUTRITION SUPPLEMENT Breakfast, Dinner; Clear Liquid  ADULT ORAL NUTRITION SUPPLEMENT Lunch; Frozen Supplement  DIET NPO  DVT PPx: Lovenox SQ held as INR is 1.5, SCDs  Code status: Full Code    Objective:     Patient Vitals for the past 24 hrs:   Temp Pulse Resp BP SpO2   07/26/21 1545 98.8 °F (37.1 °C) 83 18 102/62 98 %   07/26/21 1049 99 °F (37.2 °C) 90 18 101/62 92 %   07/26/21 0707 98.9 °F (37.2 °C) 85 19 100/62 93 %   07/26/21 0354 98.6 °F (37 °C) 85 20 (!) 99/56 94 %   07/25/21 2348 98.9 °F (37.2 °C) 88 20 102/61 92 %   07/25/21 1919 98.4 °F (36.9 °C) 94 20 105/66 98 %     Oxygen Therapy  O2 Sat (%): 98 % (07/26/21 1545)  Pulse via Oximetry: 89 beats per minute (07/24/21 0020)  O2 Device: None (Room air) (07/26/21 1530)  Skin Assessment: Clean, dry, & intact (07/26/21 0130)  Skin Protection for O2 Device: No (07/26/21 0130)  O2 Flow Rate (L/min): 0.5 l/min (07/26/21 0130)    Body mass index is 23.12 kg/m².     Physical Exam:   General:  No acute distress, speaking in full sentences, no use of accessory muscles   Lungs:  Clear to auscultation bilaterally   CV:  Regular rate and rhythm with normal S1 and S2   Abdomen:  Soft, distended, nontender, normoactive bowel sounds, no organomegaly  Extremities:  No cyanosis clubbing or edema   Neuro:  Nonfocal, A&O x3   Psych:  Normal affect     Data Review:  I have reviewed all labs, meds, and studies from the last 24 hours:    Labs:    Recent Results (from the past 24 hour(s))   CBC WITH AUTOMATED DIFF    Collection Time: 07/26/21  5:24 AM   Result Value Ref Range    WBC 14.1 (H) 4.3 - 11.1 K/uL    RBC 3.34 (L) 4.05 - 5.2 M/uL    HGB 9.9 (L) 11.7 - 15.4 g/dL    HCT 29.0 (L) 35.8 - 46.3 %    MCV 86.8 79.6 - 97.8 FL    MCH 29.6 26.1 - 32.9 PG    MCHC 34.1 31.4 - 35.0 g/dL    RDW 19.9 (H) 11.9 - 14.6 %    PLATELET 204 837 - 971 K/uL    MPV 11.3 9.4 - 12.3 FL    ABSOLUTE NRBC 0.00 0.0 - 0.2 K/uL    DF AUTOMATED      NEUTROPHILS 76 43 - 78 %    LYMPHOCYTES 16 13 - 44 % MONOCYTES 5 4.0 - 12.0 %    EOSINOPHILS 3 0.5 - 7.8 %    BASOPHILS 0 0.0 - 2.0 %    IMMATURE GRANULOCYTES 1 0.0 - 5.0 %    ABS. NEUTROPHILS 10.7 (H) 1.7 - 8.2 K/UL    ABS. LYMPHOCYTES 2.3 0.5 - 4.6 K/UL    ABS. MONOCYTES 0.7 0.1 - 1.3 K/UL    ABS. EOSINOPHILS 0.4 0.0 - 0.8 K/UL    ABS. BASOPHILS 0.1 0.0 - 0.2 K/UL    ABS. IMM. GRANS. 0.1 0.0 - 0.5 K/UL   METABOLIC PANEL, COMPREHENSIVE    Collection Time: 07/26/21  5:24 AM   Result Value Ref Range    Sodium 133 (L) 136 - 145 mmol/L    Potassium 3.7 3.5 - 5.1 mmol/L    Chloride 100 98 - 107 mmol/L    CO2 24 21 - 32 mmol/L    Anion gap 9 7 - 16 mmol/L    Glucose 80 65 - 100 mg/dL    BUN 6 6 - 23 MG/DL    Creatinine 0.58 (L) 0.6 - 1.0 MG/DL    GFR est AA >60 >60 ml/min/1.73m2    GFR est non-AA >60 >60 ml/min/1.73m2    Calcium 8.5 8.3 - 10.4 MG/DL    Bilirubin, total 3.4 (H) 0.2 - 1.1 MG/DL    ALT (SGPT) 52 12 - 65 U/L    AST (SGOT) 96 (H) 15 - 37 U/L    Alk.  phosphatase 128 50 - 130 U/L    Protein, total 5.9 (L) 6.3 - 8.2 g/dL    Albumin 3.7 3.5 - 5.0 g/dL    Globulin 2.2 (L) 2.3 - 3.5 g/dL    A-G Ratio 1.7 1.2 - 3.5     PROTHROMBIN TIME + INR    Collection Time: 07/26/21  5:24 AM   Result Value Ref Range    Prothrombin time 18.6 (H) 12.5 - 14.7 sec    INR 1.5         All Micro Results     Procedure Component Value Units Date/Time    CULTURE, URINE [[id number]] Collected: 07/24/21 0243    Order Status: Completed Specimen: Urine from Clean catch Updated: 07/26/21 0908     Special Requests: NO SPECIAL REQUESTS        Culture result:       <10,000 COLONIES/mL MIXED SKIN JAMIE ISOLATED          CULTURE, BLOOD [[id number]] Collected: 07/23/21 2336    Order Status: Completed Specimen: Blood Updated: 07/26/21 0809     Special Requests: --        NO SPECIAL REQUESTS  LEFT  HAND       Culture result: NO GROWTH 3 DAYS       CULTURE, BLOOD [[id number]] Collected: 07/23/21 2314    Order Status: Completed Specimen: Blood Updated: 07/26/21 0809     Special Requests: -- RIGHT  Antecubital       Culture result: NO GROWTH 3 DAYS             EKG Results     None          Other Studies:  US ABD LTD    Result Date: 7/26/2021  EXAM: RIGHT UPPER QUADRANT  ULTRASOUND INDICATION: Cirrhosis, fluid check COMPARISON: CT 6/26/2021 TECHNIQUE: Limited ultrasound was performed of the abdomen. FINDINGS: At least moderate volume ascites, largest pocket located in the left lower quadrant. Partially imaged liver is cirrhotic. The spleen is enlarged. Right pleural effusion versus hepatic hydrothorax. At least moderate volume ascites.        Current Meds:   Current Facility-Administered Medications   Medication Dose Route Frequency    azithromycin (ZITHROMAX) tablet 250 mg  250 mg Oral DAILY    cefTRIAXone (ROCEPHIN) 1 g in 0.9% sodium chloride (MBP/ADV) 50 mL MBP  1 g IntraVENous Q24H    cetirizine (ZYRTEC) tablet 10 mg  10 mg Oral DAILY    [Held by provider] enoxaparin (LOVENOX) injection 40 mg  40 mg SubCUTAneous DAILY    FLUoxetine (PROzac) capsule 40 mg  40 mg Oral DAILY    furosemide (LASIX) tablet 20 mg  20 mg Oral DAILY    lidocaine 4 % patch 1 Patch  1 Patch TransDERmal Q24H    pantoprazole (PROTONIX) tablet 40 mg  40 mg Oral ACB    sodium chloride (NS) flush 5-40 mL  5-40 mL IntraVENous Q8H    spironolactone (ALDACTONE) tablet 50 mg  50 mg Oral DAILY    valACYclovir (VALTREX) tablet 1,000 mg  1,000 mg Oral DAILY    benzonatate (TESSALON) capsule 100 mg  100 mg Oral TID PRN    morphine injection 4 mg  4 mg IntraVENous Q4H PRN    promethazine (PHENERGAN) tablet 12.5 mg  12.5 mg Oral Q6H PRN    Or    ondansetron (ZOFRAN) injection 4 mg  4 mg IntraVENous Q6H PRN    senna (SENOKOT) tablet 8.6 mg  1 Tablet Oral DAILY PRN    sodium chloride (NS) flush 5-40 mL  5-40 mL IntraVENous PRN       Problem List:  Hospital Problems as of 7/26/2021 Never Reviewed        Codes Class Noted - Resolved POA    Cirrhosis of liver with ascites (HCC) (Chronic) ICD-10-CM: K74.60, R18.8  ICD-9-CM: 571.5  7/24/2021 - Present Yes        * (Principal) Liver failure without hepatic coma ([facility] [address] (Chronic) ICD-10-CM: K72.90  ICD-9-CM: 572.8  7/23/2021 - Present Yes                 Signed By: Mike Dobbs MD   [facility] Hospitalist Service    July 26, 2021

## 2021-07-27 ENCOUNTER — HOSPITAL ENCOUNTER (OUTPATIENT)
Dept: INTERVENTIONAL RADIOLOGY/VASCULAR | Age: 41
Discharge: HOME | End: 2021-07-27
Attending: PHYSICIAN ASSISTANT
Payer: COMMERCIAL

## 2021-07-27 ENCOUNTER — APPOINTMENT (OUTPATIENT)
Dept: GENERAL RADIOLOGY | Age: 41
DRG: 432 | End: 2021-07-27
Attending: INTERNAL MEDICINE
Payer: COMMERCIAL

## 2021-07-27 VITALS
OXYGEN SATURATION: 100 % | SYSTOLIC BLOOD PRESSURE: 97 MMHG | DIASTOLIC BLOOD PRESSURE: 55 MMHG | RESPIRATION RATE: 14 BRPM | TEMPERATURE: 97.5 F | HEART RATE: 83 BPM

## 2021-07-27 LAB
ALBUMIN SERPL-MCNC: 3.4 G/DL (ref 3.5–5)
ALBUMIN/GLOB SERPL: 1.6 {RATIO} (ref 1.2–3.5)
ALP SERPL-CCNC: 128 U/L (ref 50–130)
ALT SERPL-CCNC: 50 U/L (ref 12–65)
ANION GAP SERPL CALC-SCNC: 6 MMOL/L (ref 7–16)
APPEARANCE FLD: CLEAR
AST SERPL-CCNC: 96 U/L (ref 15–37)
BASOPHILS # BLD: 0 K/UL (ref 0–0.2)
BASOPHILS NFR BLD: 0 % (ref 0–2)
BILIRUB SERPL-MCNC: 3.3 MG/DL (ref 0.2–1.1)
BUN SERPL-MCNC: 5 MG/DL (ref 6–23)
CALCIUM SERPL-MCNC: 8.6 MG/DL (ref 8.3–10.4)
CHLORIDE SERPL-SCNC: 103 MMOL/L (ref 98–107)
CO2 SERPL-SCNC: 27 MMOL/L (ref 21–32)
COLOR FLD: YELLOW
CREAT SERPL-MCNC: 0.61 MG/DL (ref 0.6–1)
DIFFERENTIAL METHOD BLD: ABNORMAL
EOSINOPHIL # BLD: 0.2 K/UL (ref 0–0.8)
EOSINOPHIL NFR BLD: 2 % (ref 0.5–7.8)
ERYTHROCYTE [DISTWIDTH] IN BLOOD BY AUTOMATED COUNT: 19.6 % (ref 11.9–14.6)
GLOBULIN SER CALC-MCNC: 2.1 G/DL (ref 2.3–3.5)
GLUCOSE SERPL-MCNC: 98 MG/DL (ref 65–100)
HCT VFR BLD AUTO: 27.8 % (ref 35.8–46.3)
HGB BLD-MCNC: 9.5 G/DL (ref 11.7–15.4)
IMM GRANULOCYTES # BLD AUTO: 0 K/UL (ref 0–0.5)
IMM GRANULOCYTES NFR BLD AUTO: 0 % (ref 0–5)
LYMPHOCYTES # BLD: 1.6 K/UL (ref 0.5–4.6)
LYMPHOCYTES NFR BLD: 15 % (ref 13–44)
MCH RBC QN AUTO: 29.6 PG (ref 26.1–32.9)
MCHC RBC AUTO-ENTMCNC: 34.2 G/DL (ref 31.4–35)
MCV RBC AUTO: 86.6 FL (ref 79.6–97.8)
MONOCYTES # BLD: 0.8 K/UL (ref 0.1–1.3)
MONOCYTES NFR BLD: 8 % (ref 4–12)
NEUTS SEG # BLD: 8.1 K/UL (ref 1.7–8.2)
NEUTS SEG NFR BLD: 75 % (ref 43–78)
NRBC # BLD: 0 K/UL (ref 0–0.2)
NUC CELL # FLD: 87 /CU MM
PLATELET # BLD AUTO: 190 K/UL (ref 150–450)
PMV BLD AUTO: 11.3 FL (ref 9.4–12.3)
POTASSIUM SERPL-SCNC: 4.1 MMOL/L (ref 3.5–5.1)
PROT SERPL-MCNC: 5.5 G/DL (ref 6.3–8.2)
RBC # BLD AUTO: 3.21 M/UL (ref 4.05–5.2)
RBC # FLD: <1000 /CU MM
SODIUM SERPL-SCNC: 136 MMOL/L (ref 136–145)
SPECIMEN SOURCE FLD: NORMAL
WBC # BLD AUTO: 10.8 K/UL (ref 4.3–11.1)

## 2021-07-27 PROCEDURE — 89050 BODY FLUID CELL COUNT: CPT

## 2021-07-27 PROCEDURE — 77030014146 HC TY THORCENT/PARACENT BD -B

## 2021-07-27 PROCEDURE — 74011250637 HC RX REV CODE- 250/637: Performed by: HOSPITALIST

## 2021-07-27 PROCEDURE — P9047 ALBUMIN (HUMAN), 25%, 50ML: HCPCS | Performed by: HOSPITALIST

## 2021-07-27 PROCEDURE — 80053 COMPREHEN METABOLIC PANEL: CPT

## 2021-07-27 PROCEDURE — 0W9G3ZZ DRAINAGE OF PERITONEAL CAVITY, PERCUTANEOUS APPROACH: ICD-10-PCS | Performed by: RADIOLOGY

## 2021-07-27 PROCEDURE — 74011000258 HC RX REV CODE- 258: Performed by: HOSPITALIST

## 2021-07-27 PROCEDURE — 71046 X-RAY EXAM CHEST 2 VIEWS: CPT

## 2021-07-27 PROCEDURE — 74011250636 HC RX REV CODE- 250/636: Performed by: HOSPITALIST

## 2021-07-27 PROCEDURE — 36415 COLL VENOUS BLD VENIPUNCTURE: CPT

## 2021-07-27 PROCEDURE — 85025 COMPLETE CBC W/AUTO DIFF WBC: CPT

## 2021-07-27 PROCEDURE — 77030010507 HC ADH SKN DERMBND J&J -B

## 2021-07-27 PROCEDURE — 94760 N-INVAS EAR/PLS OXIMETRY 1: CPT

## 2021-07-27 PROCEDURE — 65270000029 HC RM PRIVATE

## 2021-07-27 PROCEDURE — 87205 SMEAR GRAM STAIN: CPT

## 2021-07-27 PROCEDURE — 49083 ABD PARACENTESIS W/IMAGING: CPT

## 2021-07-27 RX ORDER — ALBUMIN HUMAN 250 G/1000ML
25 SOLUTION INTRAVENOUS EVERY 6 HOURS
Status: COMPLETED | OUTPATIENT
Start: 2021-07-27 | End: 2021-07-27

## 2021-07-27 RX ORDER — GUAIFENESIN 100 MG/5ML
100 SOLUTION ORAL
Status: DISCONTINUED | OUTPATIENT
Start: 2021-07-27 | End: 2021-07-28 | Stop reason: HOSPADM

## 2021-07-27 RX ADMIN — Medication 10 ML: at 06:52

## 2021-07-27 RX ADMIN — GUAIFENESIN 100 MG: 200 SOLUTION ORAL at 22:38

## 2021-07-27 RX ADMIN — CETIRIZINE HYDROCHLORIDE 10 MG: 10 TABLET, FILM COATED ORAL at 11:50

## 2021-07-27 RX ADMIN — AZITHROMYCIN MONOHYDRATE 250 MG: 250 TABLET ORAL at 11:51

## 2021-07-27 RX ADMIN — Medication 10 ML: at 22:38

## 2021-07-27 RX ADMIN — CEFTRIAXONE 1 G: 1 INJECTION, POWDER, FOR SOLUTION INTRAMUSCULAR; INTRAVENOUS at 11:46

## 2021-07-27 RX ADMIN — BENZONATATE 100 MG: 100 CAPSULE ORAL at 12:48

## 2021-07-27 RX ADMIN — ALBUMIN (HUMAN) 25 G: 0.25 INJECTION, SOLUTION INTRAVENOUS at 17:42

## 2021-07-27 RX ADMIN — Medication 10 ML: at 15:27

## 2021-07-27 RX ADMIN — ALBUMIN (HUMAN) 25 G: 0.25 INJECTION, SOLUTION INTRAVENOUS at 12:16

## 2021-07-27 RX ADMIN — SPIRONOLACTONE 50 MG: 25 TABLET ORAL at 11:50

## 2021-07-27 RX ADMIN — FUROSEMIDE 20 MG: 20 TABLET ORAL at 11:53

## 2021-07-27 RX ADMIN — PANTOPRAZOLE SODIUM 40 MG: 40 TABLET, DELAYED RELEASE ORAL at 06:52

## 2021-07-27 RX ADMIN — FLUOXETINE 40 MG: 20 CAPSULE ORAL at 11:50

## 2021-07-27 RX ADMIN — GUAIFENESIN 100 MG: 200 SOLUTION ORAL at 16:34

## 2021-07-27 RX ADMIN — VALACYCLOVIR HYDROCHLORIDE 1000 MG: 500 TABLET, FILM COATED ORAL at 11:50

## 2021-07-27 NOTE — PROCEDURES
Department of Interventional Radiology  [phone]        Interventional Radiology Brief Procedure Note    Patient: Melvi Weir MRN: [id number]  SSN: [id number]    YOB: 1980  Age: 36 y.o.   Sex: female      Date of Procedure: 7/27/2021    Pre-Procedure Diagnosis: cryptogenic cirrhosis, ascites    Post-Procedure Diagnosis: SAME    Procedure(s): Paracentesis    Brief Description of Procedure: US guided paracentesis, 3370 ml thin yellow fluid removed    Performed By: Husam Adhikari PA-C     Assistants: None    Anesthesia:Lidocaine    Estimated Blood Loss: None    Specimens:  ascites    Implants:  None    Findings: large volume ascites     Complications: None    Recommendations: routine wound care     Follow Up: prn    Signed By: Husam Adhikari PA-C     July 27, 2021

## 2021-07-27 NOTE — PROGRESS NOTES
TRANSFER - IN REPORT:    Verbal report received from [address], RN(name) on [facility]  being received from IR(unit) for routine progression of care      Report consisted of patients Situation, Background, Assessment and   Recommendations(SBAR). Information from the following report(s) SBAR and Procedure Summary was reviewed with the receiving nurse. Opportunity for questions and clarification was provided. Assessment completed upon patients arrival to unit and care assumed. Pt returned to room 341.

## 2021-07-27 NOTE — PROGRESS NOTES
Pt transport here to transfer DT for paracentesis. Pt has no further questions at this time. Family at bedside.

## 2021-07-27 NOTE — PROGRESS NOTES
Pt is resting in bed quietly without difficulty. No acute distress noted. Pt is noted to have a dry and non-productive cough. Pt denies c/o pain and other needs. Call light within reach. Pt is aware to call for assistance.

## 2021-07-27 NOTE — PROGRESS NOTES
[facility] Progress Note         Admit Date:  7/23/2021    Today's Date:  7/27/2021    CC:  Cirrhosis/ascites    Subjective:     Patient chart reviewed from [facility]. She is being seen later today by Dr. Shirin Baer. She is reportedly feeling better after having paracentesis earlier today. Medications:   Current Facility-Administered Medications   Medication Dose Route Frequency    albumin human 25% (BUMINATE) solution 25 g  25 g IntraVENous Q6H    guaiFENesin (ROBITUSSIN) 100 mg/5 mL oral liquid 100 mg  100 mg Oral Q4H PRN    azithromycin (ZITHROMAX) tablet 250 mg  250 mg Oral DAILY    cefTRIAXone (ROCEPHIN) 1 g in 0.9% sodium chloride (MBP/ADV) 50 mL MBP  1 g IntraVENous Q24H    cetirizine (ZYRTEC) tablet 10 mg  10 mg Oral DAILY    [Held by provider] enoxaparin (LOVENOX) injection 40 mg  40 mg SubCUTAneous DAILY    FLUoxetine (PROzac) capsule 40 mg  40 mg Oral DAILY    furosemide (LASIX) tablet 20 mg  20 mg Oral DAILY    lidocaine 4 % patch 1 Patch  1 Patch TransDERmal Q24H    pantoprazole (PROTONIX) tablet 40 mg  40 mg Oral ACB    sodium chloride (NS) flush 5-40 mL  5-40 mL IntraVENous Q8H    spironolactone (ALDACTONE) tablet 50 mg  50 mg Oral DAILY    valACYclovir (VALTREX) tablet 1,000 mg  1,000 mg Oral DAILY    morphine injection 4 mg  4 mg IntraVENous Q4H PRN    promethazine (PHENERGAN) tablet 12.5 mg  12.5 mg Oral Q6H PRN    Or    ondansetron (ZOFRAN) injection 4 mg  4 mg IntraVENous Q6H PRN    senna (SENOKOT) tablet 8.6 mg  1 Tablet Oral DAILY PRN    sodium chloride (NS) flush 5-40 mL  5-40 mL IntraVENous PRN       Review of Systems:  ROS was obtained, with pertinent positives as listed above. No chest pain or SOB.     Diet:  2 gram sodium    Objective:   Vitals:  Visit Vitals  BP 98/61 Comment: MD wants med given   Pulse 85   Temp 98.5 °F (36.9 °C)   Resp 14   Ht 5' 1\" (1.549 m)   Wt 55.3 kg (122 lb)   SpO2 93% Comment: RA   Breastfeeding No   BMI 23.05 kg/m² Intake/Output:  07/27 0701 - 07/27 1900  In: 150 [I.V.:150]  Out: 3370   No intake/output data recorded. Exam:  General appearance: alert, cooperative, no distress; jaundiced; spider angiomata of the chest  Lungs: clear to auscultation bilaterally anteriorly  Heart: regular rate and rhythm  Abdomen: soft, moderately distended but not tender. Bowel sounds normal. No masses, no organomegaly  Extremities: extremities normal, atraumatic, no cyanosis or edema  Neuro:  alert and oriented    Data Review (Labs):    Recent Labs     07/27/21  0523 07/26/21  0524 07/25/21  0514   WBC 10.8 14.1* 8.2   HGB 9.5* 9.9* 9.5*   HCT 27.8* 29.0* 27.5*    204 178   MCV 86.6 86.8 87.0    133* 135*   K 4.1 3.7 4.0    100 106   CO2 27 24 24   BUN 5* 6 6   CREA 0.61 0.58* 0.53*   CA 8.6 8.5 7.7*   MG  --   --  2.0   GLU 98 80 86    128 138*   AST 96* 96* 100*   ALT 50 52 53   TBILI 3.3* 3.4* 2.7*   ALB 3.4* 3.7 2.8*   TP 5.5* 5.9* 5.5*   PTP  --  18.6* 18.1*   INR  --  1.5 1.4       RUQ US 7/26/21 FINDINGS:  At least moderate volume ascites, largest pocket located in the left lower quadrant.     Partially imaged liver is cirrhotic. The spleen is enlarged. Right pleural effusion versus hepatic hydrothorax. IMPRESSION: At least moderate volume ascites. Paracentesis 7/27/21: 3370 ml thin yellow fluid removed   Ref. Range 7/27/2021 09:40   BODY FLUID TYPE Latest Units:   ASCITIC FLUID   FLUID APPEARANCE Latest Units:   CLEAR   FLUID COLOR Latest Units:   YELLOW   FLUID RBC CT.  Latest Units: /cu mm <1,000   FLUID WBC COUNT Latest Units: /cu mm 87     Assessment:     Principal Problem:    Liver failure without hepatic coma ([facility] [address] (7/23/2021)    Active Problems:    Cirrhosis of liver with ascites ([address] (7/24/2021)      Patient is a 40 y.o. female patient of Dr. Ruthann Pinzon (and also sees [facility]) with PMH including but not limited to GERD, Cryptogenic Cirrhosis, Ascites, Esophageal Varices, seen in GI consultation 7/24 for cirrhosis and ascites. She has had complete liver serology work up and Liver biopsy.  S/P Paracentesis on 7/2/2021 with 1.8L removed. She was found to have recurrent abdominal distention and unable to tolerate higher dose diuretics secondary to hypotension. RUQ US with obvious ascites. She is s/p repeat paracentesis 7/27/21 with 3370ml fluid removed. Fluid studies pending. MELD Na 7/27/21: 16  Plan:     - Follow up ascitic fluid studies (albumin, cell count, diff and protein). IV albumin ordered per primary team.  - 2 gram sodium diet  - Continue diuretic therapy (currently Lasix 20mg, Aldactone 50mg every day). She has reportedly been unable to tolerate high dose diuretics because of hypotension. Follow for changes in renal function. Currently Cr WNL. - PPI daily, supportive care. - Has appointment to be seen at [facility] in one week  - Pt reported HA with Midodrine. Have recommended consideration for long acting Sandostatin (pending [place]'s input) to allow higher dose of diuretic to be given. - Suspected community acquired PNA: per primary team, 7/27 Day 3 Ceftriaxone, Azithromycin. Repeat chest Xray    Patient is seen and examined in collaboration with Dr. Kareem North. Assessment and plan as per Dr. Enmanuel Vieyra. Addendum--Feels better after paracentesis noted. Still with some dyspnea though lungs relatively clear. Will increase activity and follow up final fluid studies. Discharge pending hospitalist decision re: need for IV antibiotics. Appt. At [facility] in six days.   Thanks Jodi Long MD    .

## 2021-07-27 NOTE — PROGRESS NOTES
TRANSFER - OUT REPORT:    Verbal report given to Johanne Elmore RN on Marian Danielle  being transferred to 3rd [facility] for routine post - op       Report consisted of patients Situation, Background, Assessment and   Recommendations(SBAR). Information from the following report(s) SBAR, Kardex, Procedure Summary and MAR was reviewed with the receiving nurse. Lines:   Peripheral IV 07/26/21 Posterior;Right Forearm (Active)   Site Assessment Clean, dry, & intact 07/27/21 0736   Phlebitis Assessment 0 07/27/21 0736   Infiltration Assessment 0 07/27/21 0736   Dressing Status Clean, dry, & intact 07/27/21 0736   Dressing Type Transparent 07/27/21 0736   Hub Color/Line Status Capped 07/27/21 0736        Opportunity for questions and clarification was provided. Patient transported with:   O2 @ 2 liters  3,370 ml of ascitic fluid removed. No albumin given, and sample sent to the lab.

## 2021-07-27 NOTE — PROGRESS NOTES
CM participated in interdisciplinary rounds today with hospitalist, dietary, nursing, and PT. Pt had paracentesis today. Plan home  Wednesday if cleared by GI. No dc needs anticipated.    DON PuckettW

## 2021-07-27 NOTE — PROGRESS NOTES
Hospitalist Progress Note     Name:  Meggan Mcmullen  Age:40 y.o. Sex:female   :  1980    MRN:  [id number]   Admit Date:  2021    Presenting Complaint: Jaundice and Fatigue    Initial Admission Diagnosis: Liver failure without hepatic coma [facility]) [K72.90]     Hospital Course/Interval history: This is a 36year old CF PMH stage IV liver cirrhosis with last MELD score 18, depression admitted  for ascites. Last paracentesis 2 weeks ago, she has an upcoming appointment at [facility] for transplant options. Patient was evaluated by GI. Plans for paracentesis tomorrow in the ER. Ascitic fluid analysis, cell count cultures to be sent. She has cough and chest x-ray on admission shows right lower lobe infiltrate. Patient started on ceftriaxone and azithromycin for possible pneumonia. Subjective (21): Patient reports she is feeling better after paracentesis this morning. Still has some cough. No chest pain no abdominal pain. No shortness of breath. No nausea no vomiting. Assessment & Plan     1. 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 Appreciate res. Plan for repeat paracentesis in a.m. in IR. Patient will also follow-up with evaluation for transplant at [facility] in September. INR is 1.4.   INR is 1.5. Ultrasound abdomen shows at least moderate volume ascites. Plan for IR guided paracentesis tomorrow.  patient had paracentesis done in IR this morning. Ascitic fluid analysis not suggestive of infection. Will await culture results. Ordered IV albumin to prevent reexpansion of ascitic fluid. 2. Suspected community-acquired pneumonia   Start ceftriaxone and azithromycin.  Day 3 Ceftriaxone, Azithromycin. Repeat chest Xray.      3. Depression:  stable  Home medications    Dispo/Discharge Planning: Hopefully home in 24 hours if pt continues to improve and cleared by GI. Diet:  ADULT ORAL NUTRITION SUPPLEMENT Breakfast, Dinner; Clear Liquid  ADULT ORAL NUTRITION SUPPLEMENT Lunch; Frozen Supplement  ADULT DIET Regular; Low Sodium (2 gm)  DVT PPx: Lovenox SQ held as INR is 1.5, SCDs  Code status: Full Code    Objective:     Patient Vitals for the past 24 hrs:   Temp Pulse Resp BP SpO2   07/27/21 1150    98/61    07/27/21 1145 98.5 °F (36.9 °C) 85 14 98/61 98 %   07/27/21 0716 99.1 °F (37.3 °C) 87 16 100/61 93 %   07/27/21 0537 98 °F (36.7 °C) 88 18 (!) 100/58 93 %   07/26/21 2312 99.8 °F (37.7 °C) 86 18 99/62 92 %   07/26/21 1917 99.9 °F (37.7 °C) 96 18 106/63 92 %   07/26/21 1545 98.8 °F (37.1 °C) 83 18 102/62 98 %     Oxygen Therapy  O2 Sat (%): 98 % (07/27/21 1145)  Pulse via Oximetry: 89 beats per minute (07/24/21 0020)  O2 Device: Nasal cannula (07/27/21 1150)  Skin Assessment: Clean, dry, & intact (07/26/21 0130)  Skin Protection for O2 Device: No (07/26/21 0130)  O2 Flow Rate (L/min): 1 l/min (07/27/21 1150)    Body mass index is 23.05 kg/m².     Physical Exam:   General:  No acute distress, speaking in full sentences, no use of accessory muscles   Lungs:  Clear to auscultation bilaterally   CV:  Regular rate and rhythm with normal S1 and S2   Abdomen:  Soft, less distended after paracetesis, nontender, normoactive bowel sounds, no organomegaly  Extremities:  No cyanosis clubbing or edema   Neuro:  Nonfocal, A&O x3   Psych:  Normal affect     Data Review:  I have reviewed all labs, meds, and studies from the last 24 hours:    Labs:    Recent Results (from the past 24 hour(s))   CBC WITH AUTOMATED DIFF    Collection Time: 07/27/21  5:23 AM   Result Value Ref Range    WBC 10.8 4.3 - 11.1 K/uL    RBC 3.21 (L) 4.05 - 5.2 M/uL    HGB 9.5 (L) 11.7 - 15.4 g/dL    HCT 27.8 (L) 35.8 - 46.3 %    MCV 86.6 79.6 - 97.8 FL    MCH 29.6 26.1 - 32.9 PG    MCHC 34.2 31.4 - 35.0 g/dL    RDW 19.6 (H) 11.9 - 14.6 % PLATELET 633 761 - 924 K/uL    MPV 11.3 9.4 - 12.3 FL    ABSOLUTE NRBC 0.00 0.0 - 0.2 K/uL    DF AUTOMATED      NEUTROPHILS 75 43 - 78 %    LYMPHOCYTES 15 13 - 44 %    MONOCYTES 8 4.0 - 12.0 %    EOSINOPHILS 2 0.5 - 7.8 %    BASOPHILS 0 0.0 - 2.0 %    IMMATURE GRANULOCYTES 0 0.0 - 5.0 %    ABS. NEUTROPHILS 8.1 1.7 - 8.2 K/UL    ABS. LYMPHOCYTES 1.6 0.5 - 4.6 K/UL    ABS. MONOCYTES 0.8 0.1 - 1.3 K/UL    ABS. EOSINOPHILS 0.2 0.0 - 0.8 K/UL    ABS. BASOPHILS 0.0 0.0 - 0.2 K/UL    ABS. IMM. GRANS. 0.0 0.0 - 0.5 K/UL   METABOLIC PANEL, COMPREHENSIVE    Collection Time: 07/27/21  5:23 AM   Result Value Ref Range    Sodium 136 136 - 145 mmol/L    Potassium 4.1 3.5 - 5.1 mmol/L    Chloride 103 98 - 107 mmol/L    CO2 27 21 - 32 mmol/L    Anion gap 6 (L) 7 - 16 mmol/L    Glucose 98 65 - 100 mg/dL    BUN 5 (L) 6 - 23 MG/DL    Creatinine 0.61 0.6 - 1.0 MG/DL    GFR est AA >60 >60 ml/min/1.73m2    GFR est non-AA >60 >60 ml/min/1.73m2    Calcium 8.6 8.3 - 10.4 MG/DL    Bilirubin, total 3.3 (H) 0.2 - 1.1 MG/DL    ALT (SGPT) 50 12 - 65 U/L    AST (SGOT) 96 (H) 15 - 37 U/L    Alk.  phosphatase 128 50 - 130 U/L    Protein, total 5.5 (L) 6.3 - 8.2 g/dL    Albumin 3.4 (L) 3.5 - 5.0 g/dL    Globulin 2.1 (L) 2.3 - 3.5 g/dL    A-G Ratio 1.6 1.2 - 3.5     CELL COUNT, BODY FLUID    Collection Time: 07/27/21  9:40 AM   Result Value Ref Range    BODY FLUID TYPE ASCITIC FLUID       FLUID COLOR YELLOW      FLUID APPEARANCE CLEAR      FLUID RBC CT. <1,000 /cu mm    FLUID WBC COUNT 87 /cu mm       All Micro Results     Procedure Component Value Units Date/Time    CULTURE, BLOOD [[id number]] Collected: 07/23/21 2314    Order Status: Completed Specimen: Blood Updated: 07/27/21 0802     Special Requests: --        RIGHT  Antecubital       Culture result: NO GROWTH 4 DAYS       CULTURE, BLOOD [[id number]] Collected: 07/23/21 2336    Order Status: Completed Specimen: Blood Updated: 07/27/21 0802     Special Requests: --        NO SPECIAL REQUESTS  LEFT  HAND       Culture result: NO GROWTH 4 DAYS       CULTURE, URINE [[id number]] Collected: 07/24/21 0243    Order Status: Completed Specimen: Urine from Clean catch Updated: 07/26/21 0908     Special Requests: NO SPECIAL REQUESTS        Culture result:       <10,000 COLONIES/mL MIXED SKIN JAMIE ISOLATED                EKG Results     None          Other Studies:  No results found.     Current Meds:   Current Facility-Administered Medications   Medication Dose Route Frequency    albumin human 25% (BUMINATE) solution 25 g  25 g IntraVENous Q6H    azithromycin (ZITHROMAX) tablet 250 mg  250 mg Oral DAILY    cefTRIAXone (ROCEPHIN) 1 g in 0.9% sodium chloride (MBP/ADV) 50 mL MBP  1 g IntraVENous Q24H    cetirizine (ZYRTEC) tablet 10 mg  10 mg Oral DAILY    [Held by provider] enoxaparin (LOVENOX) injection 40 mg  40 mg SubCUTAneous DAILY    FLUoxetine (PROzac) capsule 40 mg  40 mg Oral DAILY    furosemide (LASIX) tablet 20 mg  20 mg Oral DAILY    lidocaine 4 % patch 1 Patch  1 Patch TransDERmal Q24H    pantoprazole (PROTONIX) tablet 40 mg  40 mg Oral ACB    sodium chloride (NS) flush 5-40 mL  5-40 mL IntraVENous Q8H    spironolactone (ALDACTONE) tablet 50 mg  50 mg Oral DAILY    valACYclovir (VALTREX) tablet 1,000 mg  1,000 mg Oral DAILY    benzonatate (TESSALON) capsule 100 mg  100 mg Oral TID PRN    morphine injection 4 mg  4 mg IntraVENous Q4H PRN    promethazine (PHENERGAN) tablet 12.5 mg  12.5 mg Oral Q6H PRN    Or    ondansetron (ZOFRAN) injection 4 mg  4 mg IntraVENous Q6H PRN    senna (SENOKOT) tablet 8.6 mg  1 Tablet Oral DAILY PRN    sodium chloride (NS) flush 5-40 mL  5-40 mL IntraVENous PRN       Problem List:  Hospital Problems as of 7/27/2021 Never Reviewed        Codes Class Noted - Resolved POA    Cirrhosis of liver with ascites (HCC) (Chronic) ICD-10-CM: K74.60, R18.8  ICD-9-CM: 571.5  7/24/2021 - Present Yes        * (Principal) Liver failure without hepatic coma (HCC) (Chronic) ICD-10-CM: K72.90  ICD-9-CM: 572.8  7/23/2021 - Present Yes                 Signed By: Chuck Dai MD   [address] [place] [address] Service    July 27, 2021

## 2021-07-28 VITALS
HEIGHT: 61 IN | BODY MASS INDEX: 23.03 KG/M2 | WEIGHT: 122 LBS | DIASTOLIC BLOOD PRESSURE: 52 MMHG | OXYGEN SATURATION: 95 % | TEMPERATURE: 97.8 F | HEART RATE: 87 BPM | RESPIRATION RATE: 16 BRPM | SYSTOLIC BLOOD PRESSURE: 93 MMHG

## 2021-07-28 LAB
ALBUMIN SERPL-MCNC: 3.5 G/DL (ref 3.5–5)
ALBUMIN/GLOB SERPL: 1.6 {RATIO} (ref 1.2–3.5)
ALP SERPL-CCNC: 110 U/L (ref 50–130)
ALT SERPL-CCNC: 44 U/L (ref 12–65)
ANION GAP SERPL CALC-SCNC: 6 MMOL/L (ref 7–16)
AST SERPL-CCNC: 88 U/L (ref 15–37)
BACTERIA SPEC CULT: NORMAL
BACTERIA SPEC CULT: NORMAL
BASOPHILS # BLD: 0 K/UL (ref 0–0.2)
BASOPHILS NFR BLD: 0 % (ref 0–2)
BILIRUB SERPL-MCNC: 3.6 MG/DL (ref 0.2–1.1)
BUN SERPL-MCNC: 5 MG/DL (ref 6–23)
CALCIUM SERPL-MCNC: 8.7 MG/DL (ref 8.3–10.4)
CHLORIDE SERPL-SCNC: 103 MMOL/L (ref 98–107)
CO2 SERPL-SCNC: 27 MMOL/L (ref 21–32)
CREAT SERPL-MCNC: 0.52 MG/DL (ref 0.6–1)
DIFFERENTIAL METHOD BLD: ABNORMAL
EOSINOPHIL # BLD: 0.2 K/UL (ref 0–0.8)
EOSINOPHIL NFR BLD: 3 % (ref 0.5–7.8)
ERYTHROCYTE [DISTWIDTH] IN BLOOD BY AUTOMATED COUNT: 19.8 % (ref 11.9–14.6)
GLOBULIN SER CALC-MCNC: 2.2 G/DL (ref 2.3–3.5)
GLUCOSE SERPL-MCNC: 91 MG/DL (ref 65–100)
HCT VFR BLD AUTO: 26.7 % (ref 35.8–46.3)
HGB BLD-MCNC: 9.3 G/DL (ref 11.7–15.4)
IMM GRANULOCYTES # BLD AUTO: 0 K/UL (ref 0–0.5)
IMM GRANULOCYTES NFR BLD AUTO: 0 % (ref 0–5)
INR PPP: 1.5
LYMPHOCYTES # BLD: 1.5 K/UL (ref 0.5–4.6)
LYMPHOCYTES NFR BLD: 17 % (ref 13–44)
MCH RBC QN AUTO: 30.2 PG (ref 26.1–32.9)
MCHC RBC AUTO-ENTMCNC: 34.8 G/DL (ref 31.4–35)
MCV RBC AUTO: 86.7 FL (ref 79.6–97.8)
MONOCYTES # BLD: 0.6 K/UL (ref 0.1–1.3)
MONOCYTES NFR BLD: 6 % (ref 4–12)
NEUTS SEG # BLD: 6.7 K/UL (ref 1.7–8.2)
NEUTS SEG NFR BLD: 74 % (ref 43–78)
NRBC # BLD: 0 K/UL (ref 0–0.2)
PLATELET # BLD AUTO: 163 K/UL (ref 150–450)
PMV BLD AUTO: 11.6 FL (ref 9.4–12.3)
POTASSIUM SERPL-SCNC: 4.1 MMOL/L (ref 3.5–5.1)
PROT SERPL-MCNC: 5.7 G/DL (ref 6.3–8.2)
PROTHROMBIN TIME: 19.1 SEC (ref 12.5–14.7)
RBC # BLD AUTO: 3.08 M/UL (ref 4.05–5.2)
SERVICE CMNT-IMP: NORMAL
SERVICE CMNT-IMP: NORMAL
SODIUM SERPL-SCNC: 136 MMOL/L (ref 136–145)
WBC # BLD AUTO: 9.1 K/UL (ref 4.3–11.1)

## 2021-07-28 PROCEDURE — 80053 COMPREHEN METABOLIC PANEL: CPT

## 2021-07-28 PROCEDURE — 85025 COMPLETE CBC W/AUTO DIFF WBC: CPT

## 2021-07-28 PROCEDURE — 74011250637 HC RX REV CODE- 250/637: Performed by: HOSPITALIST

## 2021-07-28 PROCEDURE — 36415 COLL VENOUS BLD VENIPUNCTURE: CPT

## 2021-07-28 PROCEDURE — 85610 PROTHROMBIN TIME: CPT

## 2021-07-28 RX ORDER — CEFPODOXIME PROXETIL 200 MG/1
100 TABLET, FILM COATED ORAL 2 TIMES DAILY
Qty: 3 TABLET | Refills: 0 | Status: SHIPPED | OUTPATIENT
Start: 2021-07-28 | End: 2021-07-31

## 2021-07-28 RX ORDER — AZITHROMYCIN 250 MG/1
250 TABLET, FILM COATED ORAL ONCE
Qty: 1 TABLET | Refills: 0 | Status: SHIPPED | OUTPATIENT
Start: 2021-07-29 | End: 2021-07-29

## 2021-07-28 RX ADMIN — PANTOPRAZOLE SODIUM 40 MG: 40 TABLET, DELAYED RELEASE ORAL at 05:46

## 2021-07-28 RX ADMIN — FLUOXETINE 40 MG: 20 CAPSULE ORAL at 09:05

## 2021-07-28 RX ADMIN — VALACYCLOVIR HYDROCHLORIDE 1000 MG: 500 TABLET, FILM COATED ORAL at 09:05

## 2021-07-28 RX ADMIN — FUROSEMIDE 20 MG: 20 TABLET ORAL at 09:16

## 2021-07-28 RX ADMIN — SPIRONOLACTONE 50 MG: 25 TABLET ORAL at 09:16

## 2021-07-28 RX ADMIN — CETIRIZINE HYDROCHLORIDE 10 MG: 10 TABLET, FILM COATED ORAL at 09:05

## 2021-07-28 RX ADMIN — GUAIFENESIN 100 MG: 200 SOLUTION ORAL at 09:16

## 2021-07-28 RX ADMIN — AZITHROMYCIN MONOHYDRATE 250 MG: 250 TABLET ORAL at 09:05

## 2021-07-28 RX ADMIN — Medication 10 ML: at 05:24

## 2021-07-28 NOTE — PROGRESS NOTES
[facility] Progress Note         Admit Date:  7/23/2021    Today's Date:  7/28/2021    CC:  Cirrhosis/ascites    Subjective:       Patient feels fine and anticipating discharge home today. No complaints. Medications:   Current Facility-Administered Medications   Medication Dose Route Frequency    guaiFENesin (ROBITUSSIN) 100 mg/5 mL oral liquid 100 mg  100 mg Oral Q4H PRN    azithromycin (ZITHROMAX) tablet 250 mg  250 mg Oral DAILY    cefTRIAXone (ROCEPHIN) 1 g in 0.9% sodium chloride (MBP/ADV) 50 mL MBP  1 g IntraVENous Q24H    cetirizine (ZYRTEC) tablet 10 mg  10 mg Oral DAILY    [Held by provider] enoxaparin (LOVENOX) injection 40 mg  40 mg SubCUTAneous DAILY    FLUoxetine (PROzac) capsule 40 mg  40 mg Oral DAILY    furosemide (LASIX) tablet 20 mg  20 mg Oral DAILY    lidocaine 4 % patch 1 Patch  1 Patch TransDERmal Q24H    pantoprazole (PROTONIX) tablet 40 mg  40 mg Oral ACB    sodium chloride (NS) flush 5-40 mL  5-40 mL IntraVENous Q8H    spironolactone (ALDACTONE) tablet 50 mg  50 mg Oral DAILY    valACYclovir (VALTREX) tablet 1,000 mg  1,000 mg Oral DAILY    morphine injection 4 mg  4 mg IntraVENous Q4H PRN    promethazine (PHENERGAN) tablet 12.5 mg  12.5 mg Oral Q6H PRN    Or    ondansetron (ZOFRAN) injection 4 mg  4 mg IntraVENous Q6H PRN    senna (SENOKOT) tablet 8.6 mg  1 Tablet Oral DAILY PRN    sodium chloride (NS) flush 5-40 mL  5-40 mL IntraVENous PRN       Review of Systems:  ROS was obtained, with pertinent positives as listed above. No chest pain or SOB. Diet:  2 gram sodium    Objective:   Vitals:  Visit Vitals  BP (!) 95/54 (BP 1 Location: Left upper arm, BP Patient Position: At rest;Supine) Comment: nurse notified    Pulse 81   Temp 98.2 °F (36.8 °C)   Resp 16   Ht 5' 1\" (1.549 m)   Wt 55.3 kg (122 lb)   SpO2 94%   Breastfeeding No   BMI 23.05 kg/m²     Intake/Output:  No intake/output data recorded.   07/26 1901 - 07/28 0700  In: 150 [I.V.:150]  Out: 9124 Exam:  General appearance: alert, cooperative, no distress; jaundiced; spider angiomata of the chest    Neuro:  alert and oriented    Data Review (Labs):    Recent Labs     07/28/21  0834 07/28/21  0522 07/27/21  0523 07/26/21  0524   WBC  --  9.1 10.8 14.1*   HGB  --  9.3* 9.5* 9.9*   HCT  --  26.7* 27.8* 29.0*   PLT  --  163 190 204   MCV  --  86.7 86.6 86.8   NA  --  136 136 133*   K  --  4.1 4.1 3.7   CL  --  103 103 100   CO2  --  27 27 24   BUN  --  5* 5* 6   CREA  --  0.52* 0.61 0.58*   CA  --  8.7 8.6 8.5   GLU  --  91 98 80   AP  --  110 128 128   AST  --  88* 96* 96*   ALT  --  44 50 52   TBILI  --  3.6* 3.3* 3.4*   ALB  --  3.5 3.4* 3.7   TP  --  5.7* 5.5* 5.9*   PTP 19.1*  --   --  18.6*   INR 1.5  --   --  1.5       RUQ US 7/26/21 FINDINGS:  At least moderate volume ascites, largest pocket located in the left lower quadrant. Partially imaged liver is cirrhotic. The spleen is enlarged. Right pleural effusion versus hepatic hydrothorax. IMPRESSION: At least moderate volume ascites. Paracentesis 7/27/21: 3370 ml thin yellow fluid removed   Ref. Range 7/27/2021 09:40   BODY FLUID TYPE Latest Units:   ASCITIC FLUID   FLUID APPEARANCE Latest Units:   CLEAR   FLUID COLOR Latest Units:   YELLOW   FLUID RBC CT. Latest Units: /cu mm <1,000   FLUID WBC COUNT Latest Units: /cu mm 87     Assessment:     Principal Problem:    Liver failure without hepatic coma ([facility] [address] (7/23/2021)    Active Problems:    Cirrhosis of liver with ascites ([facility] [address] (7/24/2021)      Patient is a 36 y.o. female patient of Dr. Colin Bess (and also sees Prieto Russell) with PMH including but not limited to GERD, Cryptogenic Cirrhosis, Ascites, Esophageal Varices, seen in GI consultation 7/24 for cirrhosis and ascites. She has had complete liver serology work up and Liver biopsy. S/P Paracentesis on 7/2/2021 with 1.8L removed.  She was found to have recurrent abdominal distention and unable to tolerate higher dose diuretics secondary to hypotension. RUQ US with obvious ascites. She is s/p repeat paracentesis 7/27/21 with 3370ml fluid removed. MELD Na 7/27/21: 16  Plan:     - Follow up ascitic fluid studies--no evidence of infection. Culture, albumin and protein pending  - 2 gram sodium diet  - Continue diuretic therapy (currently Lasix 20mg, Aldactone 50mg every day). She has reportedly been unable to tolerate high dose diuretics because of hypotension. Follow for changes in renal function. Currently Cr WNL. - PPI daily, supportive care. - Has appointment to be seen at [facility] in 5 days  - Pt reported HA with Midodrine. Have recommended consideration for long acting Sandostatin (pending [place]'s input) to allow higher dose of diuretic to be given. - Suspected community acquired PNA: per primary team,  Ceftriaxone, Azithromycin. Repeat chest Xray 7/27 with unchanged mild right basilar opacity    Per patient, being discharged today per primary team. We will arrange outpatient follow up appointment with Dr. Mukesh Edmond (and she will also keep appointment with [facility] next week)     LOREN Melgar    .   GI--Above noted--patient has been discharged. Will see back in the office.   Thanks Osvaldo Donovan MD

## 2021-07-28 NOTE — DISCHARGE SUMMARY
[address] [facility] Hospitalist Discharge Summary     Name:  Cosmo Cadena    Age:40 y.o. Sex:female  :  1980       MRN:  [id number]       Admitting Physician: Jv Fitzpatrick MD Admit Date: 2021  8:54 PM   Attending Physician: No att. providers found  Primary Care Physician: Yasmeen Rizzo MD       Discharge Physician: Helen Tucker MD  Discharge date: 21   Discharged Condition: Stable    Indication for Admission:   Chief Complaint   Patient presents with    Jaundice    Fatigue        Reasons for hospitalization:  Hospital Problems as of 2021 Never Reviewed        Codes Class Noted - Resolved POA    Cirrhosis of liver with ascites ([facility] [address] (Chronic) ICD-10-CM: K74.60, R18.8  ICD-9-CM: 571.5  2021 - Present Yes        * (Principal) Liver failure without hepatic coma ([facility] [address] (Chronic) ICD-10-CM: K72.90  ICD-9-CM: 572.8  2021 - Present Yes                 Discharge Diagnosis:     Cryptogenic liver cirrhosis POA with ascites  Community-acquired pneumonia POA on antibiotics  Depression    Did Patient have Sepsis (YES OR NO): NO      Hospital Course/Interval history: This is a 36year old CF PMH stage IV liver cirrhosis with last MELD score 18, depression admitted  for ascites. Last paracentesis 2 weeks ago, she has an upcoming appointment at [facility] for transplant options. Patient was evaluated by GI. Patient underwent paracentesis on . Ascitic fluid analysis, cell count cultures pending. Preliminary report not suggestive of infection. She has cough and chest x-ray on admission shows right lower lobe infiltrate. Patient started on ceftriaxone and azithromycin for pneumonia. Subsequently switched to Cefpodoxime and azithromycin to complete course of antibiotics. Patient has hypotension at baseline with systolic ranging between 21-41 and is currently on p.o. Lasix, spironolactone.    recommends outpatient follow-up as well as with [place] for evaluation for liver transplant. She is discharged home today in a stable condition to follow with primary care physician in 3 to 5 days, GI in 1 to 2 weeks, hepatologist at [facility] in 1 week. Consults:   IP CONSULT TO GASTROENTEROLOGY  IP CONSULT TO INTERVENTIONAL RADIOLOGY     Disposition: Home or Self Care  Diet:   DIET ADULT ORAL NUTRITION SUPPLEMENT  DIET ADULT ORAL NUTRITION SUPPLEMENT  DIET ADULT  Code Status: Full Code      Follow up labs/imaging: Ascitic fluid studies. Follow up with PCP in 3 to 5 days, GI in 1 to 2 weeks, hepatologist at [facility] in 1 week. Pending labs/studies: Ascitic fluid studies. Discharge Medication List as of 7/28/2021 12:29 PM      START taking these medications    Details   azithromycin (ZITHROMAX) 250 mg tablet Take 1 Tablet by mouth once for 1 dose., Print, Disp-1 Tablet, R-0      cefpodoxime (VANTIN) 200 mg tablet Take 0.5 Tablets by mouth two (2) times a day for 3 days. , Print, Disp-3 Tablet, R-0         CONTINUE these medications which have NOT CHANGED    Details   furosemide (LASIX) 20 mg tablet Take 20 mg by mouth daily. , Historical Med      spironolactone (ALDACTONE) 50 mg tablet Take 50 mg by mouth daily. , Historical Med      ondansetron (ZOFRAN ODT) 4 mg disintegrating tablet Take 1 Tablet by mouth every eight (8) hours as needed for Nausea. , Print, Disp-8 Tablet, R-2      lidocaine (LIDODERM) 5 % Apply patch to the affected area for 12 hours a day and remove for 12 hours a day., Normal, Disp-30 Each, R-0      cetirizine (ZYRTEC) 10 mg tablet Take 10 mg by mouth daily. , Historical Med      FLUoxetine (PROzac) 40 mg capsule Take 40 mg by mouth daily. , Historical Med      magnesium oxide (MAG-OX) 400 mg tablet Take  by mouth daily. , Historical Med      therapeutic multivitamin (THERAGRAN) tablet Take 1 Tab by mouth daily. , Historical Med      valACYclovir (VALTREX) 1 gram tablet Take 1,000 mg by mouth daily. , Historical Med      pantoprazole (PROTONIX) 40 mg tablet Take 40 mg by mouth daily., Historical Med         STOP taking these medications       dicyclomine (BENTYL) 10 mg capsule Comments:   Reason for Stopping:         diclofenac (VOLTAREN) 1 % gel Comments:   Reason for Stopping:               Medications Discontinued During This Encounter   Medication Reason    FLUoxetine (PROzac) capsule 80 mg     spironolactone (ALDACTONE) tablet 100 mg     0.9% sodium chloride infusion     0.9% sodium chloride infusion     albumin human 25% (BUMINATE) solution 25 g     enoxaparin (LOVENOX) injection 40 mg     promethazine (PHENERGAN) tablet 12.5 mg     ondansetron (ZOFRAN) injection 4 mg     senna (SENOKOT) tablet 8.6 mg     sodium chloride (NS) flush 5-40 mL     sodium chloride (NS) flush 5-40 mL     azithromycin (ZITHROMAX) tablet 250 mg     benzonatate (TESSALON) capsule 100 mg     cefTRIAXone (ROCEPHIN) 1 g in 0.9% sodium chloride (MBP/ADV) 50 mL MBP     cetirizine (ZYRTEC) tablet 10 mg     FLUoxetine (PROzac) capsule 40 mg     furosemide (LASIX) tablet 20 mg     lidocaine 4 % patch 1 Patch     morphine injection 4 mg     pantoprazole (PROTONIX) tablet 40 mg     spironolactone (ALDACTONE) tablet 50 mg     valACYclovir (VALTREX) tablet 1,000 mg     phytonadione (vitamin K1) (AQUA-MEPHYTON) 10 mg in 0.9% sodium chloride 50 mL IVPB ERROR    benzonatate (TESSALON) capsule 100 mg     dicyclomine (BENTYL) 10 mg capsule Discontinued at Discharge    diclofenac (VOLTAREN) 1 % gel Discontinued at Discharge         Follow Up Orders:   Follow-up Appointments   Procedures    FOLLOW UP VISIT Appointment in: 3 - 5 Days F/U WITH PCP IN 3-5 DAYS F/U WITH GI IN 2 WEEKS     F/U WITH PCP IN 3-5 DAYS  F/U WITH GI IN 2 WEEKS     Standing Status:   Standing     Number of Occurrences:   1     Order Specific Question:   Appointment in     Answer:   3 - 5 Days         Follow-up Information     Follow up With Specialties Details Why Oren Reyes MD Internal Medicine On 8/2/2021 Apt time 11:30 am [address] [facility]      Abigail Alva MD Gastroenterology  Called  office spoke  to Bruna she states the nurse will call patient to schedule a 2 week hospital follow-up MARILYNN Grey [address]  [phone]              Discharge Exam:    Patient Vitals for the past 24 hrs:   Temp Pulse Resp BP SpO2   07/28/21 1131   16 (!) 93/52 95 %   07/28/21 1035 97.8 °F (36.6 °C) 87      07/28/21 0705 98.2 °F (36.8 °C) 81 16 (!) 95/54 94 %   07/28/21 0405 99.1 °F (37.3 °C) 79 16 (!) 96/59 95 %   07/27/21 2323 99.1 °F (37.3 °C) 89 16 (!) 96/56 94 %   07/27/21 2040    (!) 95/56    07/27/21 1916 99.1 °F (37.3 °C) 86 16 (!) 93/55 95 %   07/27/21 1537 99.4 °F (37.4 °C) 91 16 (!) 95/58 92 %   07/27/21 1517     93 %     Oxygen Therapy  O2 Sat (%): 95 % (07/28/21 1131)  Pulse via Oximetry: 89 beats per minute (07/24/21 0020)  O2 Device: None (Room air) (07/28/21 0900)  Skin Assessment: Clean, dry, & intact (07/26/21 0130)  Skin Protection for O2 Device: No (07/26/21 0130)  O2 Flow Rate (L/min): 0 l/min (07/27/21 1502)    Estimated body mass index is 23.05 kg/m² as calculated from the following:    Height as of this encounter: 5' 1\" (1.549 m). Weight as of this encounter: 55.3 kg (122 lb). No intake or output data in the 24 hours ending 07/28/21 1445    *Note that automatically entered I/Os may not be accurate; dependent on patient compliance with collection and accurate  by assistants.     Physical Exam:   General:  No acute distress, speaking in full sentences, no use of accessory muscles   HEENT:  Pupils equal and reactive to light and accommodation, oropharynx is clear   Neck:   Supple, no lymphadenopathy, no JVD   Lungs:  Clear to auscultation bilaterally   CV:  Regular rate and rhythm with normal S1 and S2   Abdomen: Soft, nontender, nondistended, normoactive bowel sounds   Extremities:  No cyanosis clubbing or edema   Neuro:  Nonfocal, A&O x3   Psych:  Normal affect       All Labs from Last 24 Hrs:  Recent Results (from the past 24 hour(s))   CBC WITH AUTOMATED DIFF    Collection Time: 07/28/21  5:22 AM   Result Value Ref Range    WBC 9.1 4.3 - 11.1 K/uL    RBC 3.08 (L) 4.05 - 5.2 M/uL    HGB 9.3 (L) 11.7 - 15.4 g/dL    HCT 26.7 (L) 35.8 - 46.3 %    MCV 86.7 79.6 - 97.8 FL    MCH 30.2 26.1 - 32.9 PG    MCHC 34.8 31.4 - 35.0 g/dL    RDW 19.8 (H) 11.9 - 14.6 %    PLATELET 136 337 - 281 K/uL    MPV 11.6 9.4 - 12.3 FL    ABSOLUTE NRBC 0.00 0.0 - 0.2 K/uL    DF AUTOMATED      NEUTROPHILS 74 43 - 78 %    LYMPHOCYTES 17 13 - 44 %    MONOCYTES 6 4.0 - 12.0 %    EOSINOPHILS 3 0.5 - 7.8 %    BASOPHILS 0 0.0 - 2.0 %    IMMATURE GRANULOCYTES 0 0.0 - 5.0 %    ABS. NEUTROPHILS 6.7 1.7 - 8.2 K/UL    ABS. LYMPHOCYTES 1.5 0.5 - 4.6 K/UL    ABS. MONOCYTES 0.6 0.1 - 1.3 K/UL    ABS. EOSINOPHILS 0.2 0.0 - 0.8 K/UL    ABS. BASOPHILS 0.0 0.0 - 0.2 K/UL    ABS. IMM. GRANS. 0.0 0.0 - 0.5 K/UL   METABOLIC PANEL, COMPREHENSIVE    Collection Time: 07/28/21  5:22 AM   Result Value Ref Range    Sodium 136 136 - 145 mmol/L    Potassium 4.1 3.5 - 5.1 mmol/L    Chloride 103 98 - 107 mmol/L    CO2 27 21 - 32 mmol/L    Anion gap 6 (L) 7 - 16 mmol/L    Glucose 91 65 - 100 mg/dL    BUN 5 (L) 6 - 23 MG/DL    Creatinine 0.52 (L) 0.6 - 1.0 MG/DL    GFR est AA >60 >60 ml/min/1.73m2    GFR est non-AA >60 >60 ml/min/1.73m2    Calcium 8.7 8.3 - 10.4 MG/DL    Bilirubin, total 3.6 (H) 0.2 - 1.1 MG/DL    ALT (SGPT) 44 12 - 65 U/L    AST (SGOT) 88 (H) 15 - 37 U/L    Alk.  phosphatase 110 50 - 130 U/L    Protein, total 5.7 (L) 6.3 - 8.2 g/dL    Albumin 3.5 3.5 - 5.0 g/dL    Globulin 2.2 (L) 2.3 - 3.5 g/dL    A-G Ratio 1.6 1.2 - 3.5     PROTHROMBIN TIME + INR    Collection Time: 07/28/21  8:34 AM   Result Value Ref Range    Prothrombin time 19.1 (H) 12.5 - 14.7 sec    INR 1.5         All Micro Results     Procedure Component Value Units Date/Time    CULTURE, BLOOD [[id number]] Collected: 07/23/21 2336    Order Status: Completed Specimen: Blood Updated: 07/28/21 0734     Special Requests: --        NO SPECIAL REQUESTS  LEFT  HAND       Culture result: NO GROWTH 5 DAYS       CULTURE, BLOOD [[id number]] Collected: 07/23/21 2314    Order Status: Completed Specimen: Blood Updated: 07/28/21 0734     Special Requests: --        RIGHT  Antecubital       Culture result: NO GROWTH 5 DAYS       CULTURE, URINE [[id number]] Collected: 07/24/21 0243    Order Status: Completed Specimen: Urine from Clean catch Updated: 07/26/21 0908     Special Requests: NO SPECIAL REQUESTS        Culture result:       <10,000 COLONIES/mL MIXED SKIN JAMIE ISOLATED                SARS-CoV-2 Lab Results  \"Novel Coronavirus\" Test: No results found for: COV2NT   \"Emergent Disease\" Test: No results found for: EDPR  \"SARS-COV-2\" Test: No results found for: XGCOVT  Rapid Test:   No results found for: COVR         Diagnostic Imaging/Tests:   XR CHEST PA LAT    Result Date: 7/27/2021  Improved inflation of the lungs with unchanged right basilar opacity. US ABD LTD    Result Date: 7/26/2021  At least moderate volume ascites. XR CHEST PORT    Result Date: 7/23/2021  Mild right lower lobe atelectasis or infiltrate and small pleural effusion, new from the prior CT abdomen on June 26, 2021. IR PARACENTESIS ABD W IMAGE    Result Date: 0/54/7271  Uncomplicated ultrasound guided paracentesis         Echocardiogram/EKG results:  No results found for this visit on 07/23/21. EKG Results     None          No results found for this or any previous visit. Procedures done this admission:  * No surgery found *        Time spent in patient discharge planning and coordination 41 minutes.       Signed By: Lamont Spann MD   [address] [place], [place] [address]ist Service    July 28, 2021  2:45 PM

## 2021-07-28 NOTE — PROGRESS NOTES
Problem: Falls - Risk of  Goal: *Absence of Falls  Description: Document Manju Vásquez Fall Risk and appropriate interventions in the flowsheet.   Outcome: Resolved/Met  Note: Fall Risk Interventions:            Medication Interventions: Evaluate medications/consider consulting pharmacy                   Problem: Patient Education: Go to Patient Education Activity  Goal: Patient/Family Education  Outcome: Resolved/Met     Problem: Nutrition Deficit  Goal: *Optimize nutritional status  Outcome: Resolved/Met

## 2021-07-29 LAB
BACTERIA SPEC CULT: NORMAL
GRAM STN SPEC: NORMAL
GRAM STN SPEC: NORMAL
SERVICE CMNT-IMP: NORMAL

## 2021-08-09 ENCOUNTER — HOSPITAL ENCOUNTER (OUTPATIENT)
Dept: INTERVENTIONAL RADIOLOGY/VASCULAR | Age: 41
Discharge: HOME | End: 2021-08-09
Attending: INTERNAL MEDICINE
Payer: COMMERCIAL

## 2021-08-09 VITALS
SYSTOLIC BLOOD PRESSURE: 94 MMHG | HEART RATE: 94 BPM | OXYGEN SATURATION: 98 % | DIASTOLIC BLOOD PRESSURE: 52 MMHG | RESPIRATION RATE: 14 BRPM | WEIGHT: 122 LBS | BODY MASS INDEX: 23.03 KG/M2 | HEIGHT: 61 IN | TEMPERATURE: 97.3 F

## 2021-08-09 DIAGNOSIS — K74.69 OTHER CIRRHOSIS OF LIVER (HCC): ICD-10-CM

## 2021-08-09 PROCEDURE — 77030014146 HC TY THORCENT/PARACENT BD -B

## 2021-08-09 PROCEDURE — 77030010507 HC ADH SKN DERMBND J&J -B

## 2021-08-09 PROCEDURE — 49083 ABD PARACENTESIS W/IMAGING: CPT

## 2021-08-09 PROCEDURE — 74011250636 HC RX REV CODE- 250/636: Performed by: INTERNAL MEDICINE

## 2021-08-09 PROCEDURE — P9047 ALBUMIN (HUMAN), 25%, 50ML: HCPCS | Performed by: INTERNAL MEDICINE

## 2021-08-09 RX ORDER — ALBUMIN HUMAN 250 G/1000ML
12.5 SOLUTION INTRAVENOUS ONCE
Status: COMPLETED | OUTPATIENT
Start: 2021-08-09 | End: 2021-08-09

## 2021-08-09 RX ADMIN — ALBUMIN (HUMAN) 12.5 G: 0.25 INJECTION, SOLUTION INTRAVENOUS at 08:20

## 2021-08-09 RX ADMIN — ALBUMIN (HUMAN) 12.5 G: 0.25 INJECTION, SOLUTION INTRAVENOUS at 08:39

## 2021-08-09 NOTE — DISCHARGE INSTRUCTIONS
[address]  Department of Interventional Radiology  [facility]  [phone] Office  [phone] Fax    PARACENTESIS DISCHARGE INSTRUCTIONS    General Information:  During this procedure, the doctor will insert a needle into the abdomen to drain fluid. After the procedure, you will be able to take a deep breath much easier. The site of the puncture may ooze the first day. This will decrease and eventually stop. Paracentesis (draining fluid from the abdomen) sometimes makes patients hypotensive (low blood pressure). Your doctor may order for you to receive fluids or albumin (a volume booster) during the procedure through an IV site. Home Care Instructions:  Keep the puncture site clean and dry. No tub baths or swimming until puncture site heals. Showering is acceptable. Resume your normal diet, and resume your normal activity slowly and as you tolerate. If you are short of breath, rest. If shortness of breath does not ease, please call your ordering doctor. Fluid can re-accumulate in the chest and/or in the abdomen. If this should occur, your doctor needs to know as you may need to have the procedure done again. Call If:     You should call your Physician and/or the Radiology Nurse if you notice any signs of infection, like pus draining, or if it is swollen or reddened. Also call if you have a fever, or if you are bleeding from the puncture site more than a small amount on the dressing. Call if the puncture site keeps draining fluid. Some oozing is to be expected, but should slow and then stop. Call if you feel like you have pressure in your abdomen. SEEK IMMEDIATE CARE OR CALL 911 IF YOU SUDDENLY HAVE TROUBLE BREATHING, OR IF YOUR LIPS TURN BLUE, OR IF YOU NOTICE BLOOD IN YOUR SPUTUM. Follow-Up Instructions: Please see your ordering doctor as he/she has requested. To Reach Us:     If you have any questions about your procedure, please call the Interventional Radiology department at [phone]. After business hours (5pm) and weekends, call the answering service at [phone] and ask for the Radiologist on call to be paged. Si tiene Preguntas acerca del procedimiento, por favor llame al departamento de Radiología Intervencional al [phone]. Después de horas de oficina (5 pm) y los fines de [place], llamar al Henri Danielle al [phone] y pregunte por el Radiologo de Maira Beck. Interventional Radiology General Nurse Discharge    After general anesthesia or intravenous sedation, for 24 hours or while taking prescription Narcotics:  · Limit your activities  · Do not drive and operate hazardous machinery  · Do not make important personal or business decisions  · Do  not drink alcoholic beverages  · If you have not urinated within 8 hours after discharge, please contact your surgeon on call. * Please give a list of your current medications to your Primary Care Provider. * Please update this list whenever your medications are discontinued, doses are     changed, or new medications (including over-the-counter products) are added. * Please carry medication information at all times in case of emergency situations. These are general instructions for a healthy lifestyle:    No smoking/ No tobacco products/ Avoid exposure to second hand smoke  Surgeon General's Warning:  Quitting smoking now greatly reduces serious risk to your health. Obesity, smoking, and sedentary lifestyle greatly increases your risk for illness  A healthy diet, regular physical exercise & weight monitoring are important for maintaining a healthy lifestyle    You may be retaining fluid if you have a history of heart failure or if you experience any of the following symptoms:  Weight gain of 3 pounds or more overnight or 5 pounds in a week, increased swelling in our hands or feet or shortness of breath while lying flat in bed.   Please call your doctor as soon as you notice any of these symptoms; do not wait until your next office visit. Recognize signs and symptoms of STROKE:  F-face looks uneven    A-arms unable to move or move unevenly    S-speech slurred or non-existent    T-time-call 911 as soon as signs and symptoms begin-DO NOT go       Back to bed or wait to see if you get better-TIME IS BRAIN.       Date: 8/9/2021  Discharging Nurse: Tremayne Duran RN

## 2021-08-09 NOTE — PROGRESS NOTES
Discharge instructions given and reviewed with patient. Questions answered. Patient denies feeling weakness or dizziness upon standing.

## 2021-08-23 ENCOUNTER — HOSPITAL ENCOUNTER (OUTPATIENT)
Dept: INTERVENTIONAL RADIOLOGY/VASCULAR | Age: 41
Discharge: HOME | End: 2021-08-23
Attending: INTERNAL MEDICINE
Payer: COMMERCIAL

## 2021-08-23 VITALS
TEMPERATURE: 97.9 F | HEART RATE: 96 BPM | SYSTOLIC BLOOD PRESSURE: 87 MMHG | RESPIRATION RATE: 18 BRPM | DIASTOLIC BLOOD PRESSURE: 54 MMHG | BODY MASS INDEX: 21.34 KG/M2 | OXYGEN SATURATION: 98 % | HEIGHT: 61 IN | WEIGHT: 113 LBS

## 2021-08-23 DIAGNOSIS — K74.69 OTHER CIRRHOSIS OF LIVER (HCC): ICD-10-CM

## 2021-08-23 PROCEDURE — 2709999900 HC NON-CHARGEABLE SUPPLY

## 2021-08-23 PROCEDURE — 77030010507 HC ADH SKN DERMBND J&J -B

## 2021-08-23 PROCEDURE — 49083 ABD PARACENTESIS W/IMAGING: CPT

## 2021-08-23 PROCEDURE — 77030014146 HC TY THORCENT/PARACENT BD -B

## 2021-08-23 NOTE — PROGRESS NOTES
Discharge instructions reviewed with patient, time allotted for questions, next appointment provided. Pt discharged to home.

## 2021-08-23 NOTE — DISCHARGE INSTRUCTIONS
[address]  Department of Interventional Radiology  [facility]  [phone] Office  [phone] Fax    PARACENTESIS DISCHARGE INSTRUCTIONS    General Information:  During this procedure, the doctor will insert a needle into the abdomen to drain fluid. After the procedure, you will be able to take a deep breath much easier. The site of the puncture may ooze the first day. This will decrease and eventually stop. Paracentesis (draining fluid from the abdomen) sometimes makes patients hypotensive (low blood pressure). Your doctor may order for you to receive fluids or albumin (a volume booster) during the procedure through an IV site. Home Care Instructions:  Keep the puncture site clean and dry. No tub baths or swimming until puncture site heals. Showering is acceptable. Resume your normal diet, and resume your normal activity slowly and as you tolerate. If you are short of breath, rest. If shortness of breath does not ease, please call your ordering doctor. Fluid can re-accumulate in the chest and/or in the abdomen. If this should occur, your doctor needs to know as you may need to have the procedure done again. Call If:     You should call your Physician and/or the Radiology Nurse if you notice any signs of infection, like pus draining, or if it is swollen or reddened. Also call if you have a fever, or if you are bleeding from the puncture site more than a small amount on the dressing. Call if the puncture site keeps draining fluid. Some oozing is to be expected, but should slow and then stop. Call if you feel like you have pressure in your abdomen. SEEK IMMEDIATE CARE OR CALL 911 IF YOU SUDDENLY HAVE TROUBLE BREATHING, OR IF YOUR LIPS TURN BLUE, OR IF YOU NOTICE BLOOD IN YOUR SPUTUM. Follow-Up Instructions: Please see your ordering doctor as he/she has requested. To Reach Us:   If you have any questions about your procedure, please call the Interventional Radiology department at [phone]. After business hours (5pm) and weekends, call the answering service at [phone] and ask for the Radiologist on call to be paged. Si tiene Preguntas acerca del procedimiento, por favor llame al departamento de Radiología Intervencional al [phone]. Después de horas de oficina (5 pm) y los fines de [place], llamar al Michael Danielle al [phone] y pregunte por el Radiologo de Maira Beck. Interventional Radiology General Nurse Discharge    After general anesthesia or intravenous sedation, for 24 hours or while taking prescription Narcotics:  · Limit your activities  · Do not drive and operate hazardous machinery  · Do not make important personal or business decisions  · Do  not drink alcoholic beverages  · If you have not urinated within 8 hours after discharge, please contact your surgeon on call. * Please give a list of your current medications to your Primary Care Provider. * Please update this list whenever your medications are discontinued, doses are     changed, or new medications (including over-the-counter products) are added. * Please carry medication information at all times in case of emergency situations. These are general instructions for a healthy lifestyle:    No smoking/ No tobacco products/ Avoid exposure to second hand smoke  Surgeon General's Warning:  Quitting smoking now greatly reduces serious risk to your health. Obesity, smoking, and sedentary lifestyle greatly increases your risk for illness  A healthy diet, regular physical exercise & weight monitoring are important for maintaining a healthy lifestyle    You may be retaining fluid if you have a history of heart failure or if you experience any of the following symptoms:  Weight gain of 3 pounds or more overnight or 5 pounds in a week, increased swelling in our hands or feet or shortness of breath while lying flat in bed.   Please call your doctor as soon as you notice any of these symptoms; do not wait until your next office visit. Recognize signs and symptoms of STROKE:  F-face looks uneven    A-arms unable to move or move unevenly    S-speech slurred or non-existent    T-time-call 911 as soon as signs and symptoms begin-DO NOT go       Back to bed or wait to see if you get better-TIME IS BRAIN.

## 2021-08-28 ENCOUNTER — HOSPITAL ENCOUNTER (INPATIENT)
Age: 41
LOS: 4 days | Discharge: NURSING, REHAB OR LONG-TERM CARE | DRG: 432 | End: 2021-09-02
Attending: EMERGENCY MEDICINE | Admitting: FAMILY MEDICINE
Payer: COMMERCIAL

## 2021-08-28 DIAGNOSIS — R18.8 ASCITES: ICD-10-CM

## 2021-08-28 LAB
ALBUMIN SERPL-MCNC: 3.1 G/DL (ref 3.5–5)
ALBUMIN/GLOB SERPL: 0.8 {RATIO} (ref 1.2–3.5)
ALP SERPL-CCNC: 194 U/L (ref 50–136)
ALT SERPL-CCNC: 71 U/L (ref 12–65)
ANION GAP SERPL CALC-SCNC: 10 MMOL/L (ref 7–16)
AST SERPL-CCNC: 147 U/L (ref 15–37)
BACTERIA URNS QL MICRO: 0 /HPF
BASOPHILS # BLD: 0 K/UL (ref 0–0.2)
BASOPHILS NFR BLD: 0 % (ref 0–2)
BILIRUB SERPL-MCNC: 5.2 MG/DL (ref 0.2–1.1)
BUN SERPL-MCNC: 18 MG/DL (ref 6–23)
CALCIUM SERPL-MCNC: 9.3 MG/DL (ref 8.3–10.4)
CASTS URNS QL MICRO: 0 /LPF
CHLORIDE SERPL-SCNC: 95 MMOL/L (ref 98–107)
CO2 SERPL-SCNC: 22 MMOL/L (ref 21–32)
CREAT SERPL-MCNC: 0.98 MG/DL (ref 0.6–1)
CRYSTALS URNS QL MICRO: 0 /LPF
DIFFERENTIAL METHOD BLD: ABNORMAL
EOSINOPHIL # BLD: 0.2 K/UL (ref 0–0.8)
EOSINOPHIL NFR BLD: 1 % (ref 0.5–7.8)
EPI CELLS #/AREA URNS HPF: NORMAL /HPF
ERYTHROCYTE [DISTWIDTH] IN BLOOD BY AUTOMATED COUNT: 22.7 % (ref 11.9–14.6)
GLOBULIN SER CALC-MCNC: 3.9 G/DL (ref 2.3–3.5)
GLUCOSE SERPL-MCNC: 111 MG/DL (ref 65–100)
HCG UR QL: NEGATIVE
HCT VFR BLD AUTO: 34 % (ref 35.8–46.3)
HGB BLD-MCNC: 12.1 G/DL (ref 11.7–15.4)
IMM GRANULOCYTES # BLD AUTO: 0.1 K/UL (ref 0–0.5)
IMM GRANULOCYTES NFR BLD AUTO: 0 % (ref 0–5)
LACTATE SERPL-SCNC: 2.6 MMOL/L (ref 0.4–2)
LIPASE SERPL-CCNC: 578 U/L (ref 73–393)
LYMPHOCYTES # BLD: 1.8 K/UL (ref 0.5–4.6)
LYMPHOCYTES NFR BLD: 12 % (ref 13–44)
MCH RBC QN AUTO: 31.8 PG (ref 26.1–32.9)
MCHC RBC AUTO-ENTMCNC: 35.6 G/DL (ref 31.4–35)
MCV RBC AUTO: 89.2 FL (ref 79.6–97.8)
MONOCYTES # BLD: 1.1 K/UL (ref 0.1–1.3)
MONOCYTES NFR BLD: 8 % (ref 4–12)
MUCOUS THREADS URNS QL MICRO: 0 /LPF
NEUTS SEG # BLD: 11.6 K/UL (ref 1.7–8.2)
NEUTS SEG NFR BLD: 79 % (ref 43–78)
NRBC # BLD: 0 K/UL (ref 0–0.2)
PLATELET # BLD AUTO: 225 K/UL (ref 150–450)
PMV BLD AUTO: 10.5 FL (ref 9.4–12.3)
POTASSIUM SERPL-SCNC: 4.6 MMOL/L (ref 3.5–5.1)
PROT SERPL-MCNC: 7 G/DL (ref 6.3–8.2)
RBC # BLD AUTO: 3.81 M/UL (ref 4.05–5.2)
RBC #/AREA URNS HPF: NORMAL /HPF
SODIUM SERPL-SCNC: 127 MMOL/L (ref 136–145)
WBC # BLD AUTO: 14.7 K/UL (ref 4.3–11.1)
WBC URNS QL MICRO: NORMAL /HPF

## 2021-08-28 PROCEDURE — 85025 COMPLETE CBC W/AUTO DIFF WBC: CPT

## 2021-08-28 PROCEDURE — 96375 TX/PRO/DX INJ NEW DRUG ADDON: CPT

## 2021-08-28 PROCEDURE — 81025 URINE PREGNANCY TEST: CPT

## 2021-08-28 PROCEDURE — 81015 MICROSCOPIC EXAM OF URINE: CPT

## 2021-08-28 PROCEDURE — 96374 THER/PROPH/DIAG INJ IV PUSH: CPT

## 2021-08-28 PROCEDURE — 74011250636 HC RX REV CODE- 250/636: Performed by: EMERGENCY MEDICINE

## 2021-08-28 PROCEDURE — 84145 PROCALCITONIN (PCT): CPT

## 2021-08-28 PROCEDURE — 99284 EMERGENCY DEPT VISIT MOD MDM: CPT

## 2021-08-28 PROCEDURE — 80053 COMPREHEN METABOLIC PANEL: CPT

## 2021-08-28 PROCEDURE — 85610 PROTHROMBIN TIME: CPT

## 2021-08-28 PROCEDURE — 83690 ASSAY OF LIPASE: CPT

## 2021-08-28 PROCEDURE — 96376 TX/PRO/DX INJ SAME DRUG ADON: CPT

## 2021-08-28 PROCEDURE — 83605 ASSAY OF LACTIC ACID: CPT

## 2021-08-28 RX ORDER — MORPHINE SULFATE 2 MG/ML
2 INJECTION, SOLUTION INTRAMUSCULAR; INTRAVENOUS
Status: COMPLETED | OUTPATIENT
Start: 2021-08-28 | End: 2021-08-28

## 2021-08-28 RX ORDER — MORPHINE SULFATE 4 MG/ML
4 INJECTION INTRAVENOUS
Status: COMPLETED | OUTPATIENT
Start: 2021-08-28 | End: 2021-08-28

## 2021-08-28 RX ORDER — ONDANSETRON 2 MG/ML
4 INJECTION INTRAMUSCULAR; INTRAVENOUS
Status: COMPLETED | OUTPATIENT
Start: 2021-08-28 | End: 2021-08-28

## 2021-08-28 RX ORDER — SODIUM CHLORIDE 0.9 % (FLUSH) 0.9 %
5-10 SYRINGE (ML) INJECTION AS NEEDED
Status: DISCONTINUED | OUTPATIENT
Start: 2021-08-28 | End: 2021-08-29 | Stop reason: SDUPTHER

## 2021-08-28 RX ORDER — SODIUM CHLORIDE 0.9 % (FLUSH) 0.9 %
5-10 SYRINGE (ML) INJECTION EVERY 8 HOURS
Status: DISCONTINUED | OUTPATIENT
Start: 2021-08-28 | End: 2021-08-29 | Stop reason: SDUPTHER

## 2021-08-28 RX ADMIN — ONDANSETRON 4 MG: 2 INJECTION INTRAMUSCULAR; INTRAVENOUS at 22:44

## 2021-08-28 RX ADMIN — MORPHINE SULFATE 4 MG: 4 INJECTION INTRAVENOUS at 23:40

## 2021-08-28 RX ADMIN — MORPHINE SULFATE 2 MG: 2 INJECTION, SOLUTION INTRAMUSCULAR; INTRAVENOUS at 22:33

## 2021-08-28 RX ADMIN — SODIUM CHLORIDE 1000 ML: 900 INJECTION, SOLUTION INTRAVENOUS at 22:05

## 2021-08-29 ENCOUNTER — APPOINTMENT (OUTPATIENT)
Dept: CT IMAGING | Age: 41
DRG: 432 | End: 2021-08-29
Attending: FAMILY MEDICINE
Payer: COMMERCIAL

## 2021-08-29 PROBLEM — E87.1 HYPONATREMIA: Status: ACTIVE | Noted: 2021-08-29

## 2021-08-29 PROBLEM — R10.9 ABDOMINAL PAIN: Status: ACTIVE | Noted: 2021-08-29

## 2021-08-29 PROBLEM — R79.89 ELEVATED LACTIC ACID LEVEL: Status: ACTIVE | Noted: 2021-08-29

## 2021-08-29 PROBLEM — R79.89 ELEVATED LFTS: Status: ACTIVE | Noted: 2021-08-29

## 2021-08-29 LAB
ALBUMIN SERPL-MCNC: 2.8 G/DL (ref 3.5–5)
ALBUMIN/GLOB SERPL: 0.9 {RATIO} (ref 1.2–3.5)
ALP SERPL-CCNC: 161 U/L (ref 50–136)
ALT SERPL-CCNC: 62 U/L (ref 12–65)
ANION GAP SERPL CALC-SCNC: 5 MMOL/L (ref 7–16)
AST SERPL-CCNC: 114 U/L (ref 15–37)
BILIRUB SERPL-MCNC: 5.1 MG/DL (ref 0.2–1.1)
BUN SERPL-MCNC: 19 MG/DL (ref 6–23)
CALCIUM SERPL-MCNC: 7.6 MG/DL (ref 8.3–10.4)
CHLORIDE SERPL-SCNC: 99 MMOL/L (ref 98–107)
CO2 SERPL-SCNC: 24 MMOL/L (ref 21–32)
CREAT SERPL-MCNC: 0.72 MG/DL (ref 0.6–1)
ERYTHROCYTE [DISTWIDTH] IN BLOOD BY AUTOMATED COUNT: 22.7 % (ref 11.9–14.6)
GLOBULIN SER CALC-MCNC: 3.2 G/DL (ref 2.3–3.5)
GLUCOSE SERPL-MCNC: 109 MG/DL (ref 65–100)
HCT VFR BLD AUTO: 31.3 % (ref 35.8–46.3)
HGB BLD-MCNC: 10.9 G/DL (ref 11.7–15.4)
INR PPP: 1.3
LACTATE SERPL-SCNC: 2.2 MMOL/L (ref 0.4–2)
MCH RBC QN AUTO: 32.1 PG (ref 26.1–32.9)
MCHC RBC AUTO-ENTMCNC: 34.8 G/DL (ref 31.4–35)
MCV RBC AUTO: 92.1 FL (ref 79.6–97.8)
NRBC # BLD: 0 K/UL (ref 0–0.2)
PLATELET # BLD AUTO: 206 K/UL (ref 150–450)
PMV BLD AUTO: 11 FL (ref 9.4–12.3)
POTASSIUM SERPL-SCNC: 4.4 MMOL/L (ref 3.5–5.1)
PROCALCITONIN SERPL-MCNC: 0.18 NG/ML
PROT SERPL-MCNC: 6 G/DL (ref 6.3–8.2)
PROTHROMBIN TIME: 17 SEC (ref 12.6–14.5)
RBC # BLD AUTO: 3.4 M/UL (ref 4.05–5.2)
SODIUM SERPL-SCNC: 128 MMOL/L (ref 136–145)
WBC # BLD AUTO: 15.2 K/UL (ref 4.3–11.1)

## 2021-08-29 PROCEDURE — 85027 COMPLETE CBC AUTOMATED: CPT

## 2021-08-29 PROCEDURE — 74011250636 HC RX REV CODE- 250/636: Performed by: FAMILY MEDICINE

## 2021-08-29 PROCEDURE — 83605 ASSAY OF LACTIC ACID: CPT

## 2021-08-29 PROCEDURE — 74176 CT ABD & PELVIS W/O CONTRAST: CPT

## 2021-08-29 PROCEDURE — 74011250637 HC RX REV CODE- 250/637: Performed by: FAMILY MEDICINE

## 2021-08-29 PROCEDURE — 80053 COMPREHEN METABOLIC PANEL: CPT

## 2021-08-29 PROCEDURE — 65270000029 HC RM PRIVATE

## 2021-08-29 PROCEDURE — 36415 COLL VENOUS BLD VENIPUNCTURE: CPT

## 2021-08-29 RX ORDER — VALACYCLOVIR HYDROCHLORIDE 500 MG/1
1000 TABLET, FILM COATED ORAL DAILY
Status: DISCONTINUED | OUTPATIENT
Start: 2021-08-29 | End: 2021-09-02 | Stop reason: HOSPADM

## 2021-08-29 RX ORDER — MORPHINE SULFATE 2 MG/ML
2 INJECTION, SOLUTION INTRAMUSCULAR; INTRAVENOUS
Status: DISCONTINUED | OUTPATIENT
Start: 2021-08-29 | End: 2021-08-29

## 2021-08-29 RX ORDER — ONDANSETRON 2 MG/ML
4 INJECTION INTRAMUSCULAR; INTRAVENOUS
Status: DISCONTINUED | OUTPATIENT
Start: 2021-08-29 | End: 2021-09-02 | Stop reason: HOSPADM

## 2021-08-29 RX ORDER — CETIRIZINE HCL 10 MG
10 TABLET ORAL DAILY
Status: DISCONTINUED | OUTPATIENT
Start: 2021-08-29 | End: 2021-09-02 | Stop reason: HOSPADM

## 2021-08-29 RX ORDER — FLUOXETINE HYDROCHLORIDE 20 MG/1
40 CAPSULE ORAL DAILY
Status: DISCONTINUED | OUTPATIENT
Start: 2021-08-29 | End: 2021-09-02 | Stop reason: HOSPADM

## 2021-08-29 RX ORDER — FUROSEMIDE 20 MG/1
20 TABLET ORAL DAILY
Status: DISCONTINUED | OUTPATIENT
Start: 2021-08-29 | End: 2021-09-01

## 2021-08-29 RX ORDER — LIDOCAINE 4 G/100G
1 PATCH TOPICAL EVERY 24 HOURS
Status: DISCONTINUED | OUTPATIENT
Start: 2021-08-29 | End: 2021-08-29

## 2021-08-29 RX ORDER — SODIUM CHLORIDE 0.9 % (FLUSH) 0.9 %
5-40 SYRINGE (ML) INJECTION EVERY 8 HOURS
Status: DISCONTINUED | OUTPATIENT
Start: 2021-08-29 | End: 2021-09-02 | Stop reason: HOSPADM

## 2021-08-29 RX ORDER — POLYETHYLENE GLYCOL 3350 17 G/17G
17 POWDER, FOR SOLUTION ORAL DAILY PRN
Status: DISCONTINUED | OUTPATIENT
Start: 2021-08-29 | End: 2021-08-30

## 2021-08-29 RX ORDER — MORPHINE SULFATE 2 MG/ML
2 INJECTION, SOLUTION INTRAMUSCULAR; INTRAVENOUS
Status: DISCONTINUED | OUTPATIENT
Start: 2021-08-29 | End: 2021-09-02 | Stop reason: HOSPADM

## 2021-08-29 RX ORDER — SPIRONOLACTONE 25 MG/1
50 TABLET ORAL DAILY
Status: DISCONTINUED | OUTPATIENT
Start: 2021-08-29 | End: 2021-09-01

## 2021-08-29 RX ORDER — PANTOPRAZOLE SODIUM 40 MG/1
40 TABLET, DELAYED RELEASE ORAL
Status: DISCONTINUED | OUTPATIENT
Start: 2021-08-29 | End: 2021-09-02 | Stop reason: HOSPADM

## 2021-08-29 RX ORDER — SODIUM CHLORIDE 0.9 % (FLUSH) 0.9 %
5-40 SYRINGE (ML) INJECTION AS NEEDED
Status: DISCONTINUED | OUTPATIENT
Start: 2021-08-29 | End: 2021-09-02 | Stop reason: HOSPADM

## 2021-08-29 RX ORDER — ONDANSETRON 4 MG/1
4 TABLET, ORALLY DISINTEGRATING ORAL
Status: DISCONTINUED | OUTPATIENT
Start: 2021-08-29 | End: 2021-09-02 | Stop reason: HOSPADM

## 2021-08-29 RX ORDER — HYDROCODONE BITARTRATE AND HOMATROPINE METHYLBROMIDE 1.5; 5 MG/5ML; MG/5ML
5 SYRUP ORAL
Status: DISCONTINUED | OUTPATIENT
Start: 2021-08-29 | End: 2021-09-02 | Stop reason: HOSPADM

## 2021-08-29 RX ADMIN — CETIRIZINE HYDROCHLORIDE 10 MG: 10 TABLET, FILM COATED ORAL at 08:36

## 2021-08-29 RX ADMIN — Medication 10 ML: at 06:24

## 2021-08-29 RX ADMIN — Medication 10 ML: at 21:13

## 2021-08-29 RX ADMIN — PANTOPRAZOLE SODIUM 40 MG: 40 TABLET, DELAYED RELEASE ORAL at 06:24

## 2021-08-29 RX ADMIN — VALACYCLOVIR HYDROCHLORIDE 1000 MG: 500 TABLET, FILM COATED ORAL at 08:36

## 2021-08-29 RX ADMIN — ONDANSETRON 4 MG: 2 INJECTION INTRAMUSCULAR; INTRAVENOUS at 20:27

## 2021-08-29 RX ADMIN — Medication 10 ML: at 02:15

## 2021-08-29 RX ADMIN — HYDROCODONE BITARTRATE AND HOMATROPINE METHYLBROMIDE 5 ML: 5; 1.5 SOLUTION ORAL at 21:12

## 2021-08-29 RX ADMIN — SPIRONOLACTONE 50 MG: 25 TABLET ORAL at 08:36

## 2021-08-29 RX ADMIN — FUROSEMIDE 20 MG: 20 TABLET ORAL at 08:36

## 2021-08-29 RX ADMIN — FLUOXETINE 40 MG: 20 CAPSULE ORAL at 08:36

## 2021-08-29 NOTE — PROGRESS NOTES
Hospitalist Progress Note     Admit Date:  2021  8:57 PM   Name:  Sarahi Goodson   Age:  36 y.o.  :  1980   MRN:  [id number]     Presenting Complaint: Abdominal Pain and Back Pain    Initial Admission Diagnosis: Abdominal pain [R10.9]     Assessment and Plan:   # Abdominal pain in setting of cryptogenic cirrhosis and ascites   - Just had paracentesis last week, 4L removed, now CT showing large volume reaccumulation, no other acute process. Afebrile, does have leukocytosis but she feels better today. Will con't to hold off on abx and plan for diagnostic/therapeutic paracentesis tomorrow, labs ordered. CBC tomorrow. # Cryptogenic cirrhosis with large volume ascites   - Seen by GI previously, has appointment in a few weeks at [facility], hopeful to be put on transplant list.    - Para as above, on diuretics, keep dosing the same for now and titrate based on BPs. # Elevated bilirubin/transaminitis   - 2/2 cirrhosis. # Elevated LA   - Improved, prone to lactic acidosis with liver disease. # HypoNa   - Likely from cirrhosis. Monitor. Discharge Planning: Home when able. Diet:  ADULT DIET Regular  DVT PPx:   Code status: Full Code    Active Hospital Problems    Diagnosis Date Noted    Abdominal pain 2021    Hyponatremia 2021    Elevated LFTs 2021    Elevated lactic acid level 2021    Cirrhosis of liver with ascites ([facility] [address] 2021       Hospital Course:   Ms. Azar Riggins is a very nice 37 y/o WF recently diagnosed with cryptogenic cirrhosis, s/p para x4 since 2021, who was admitted on  with progressive abdominal pain and concern for SBP. Afebrile, WBCs 14k, CT a/p w/o acute process. Held on antibiotics, paracentesis ordered. 24hr Events/Subjective (21):   : In bed, a little SOB now but says overall she feels better. Her abdo pain has improved, some trouble with deep breath due to abdominal distension.  Otherwise no chest pain, N/V/D or bleeding. Objective:     Patient Vitals for the past 24 hrs:   Temp Pulse Resp BP SpO2   08/29/21 1109 98.4 °F (36.9 °C) 95 19 99/63 94 %   08/29/21 0715 98 °F (36.7 °C) 97 19 103/63 94 %   08/29/21 0447 98 °F (36.7 °C) 99 19 100/63 95 %   08/29/21 0110 98.9 °F (37.2 °C) (!) 103 20 106/66 96 %   08/29/21 0031 -- 99 22 (!) 104/58 100 %   08/28/21 2307 -- 93 18 (!) 102/53 100 %   08/28/21 2243 -- -- -- -- 100 %   08/28/21 2040 99 °F (37.2 °C) 95 15 (!) 101/47 100 %     Oxygen Therapy  O2 Sat (%): 94 % (08/29/21 1109)  Pulse via Oximetry: 94 beats per minute (08/28/21 2243)  O2 Device: None (08/29/21 0031)    Estimated body mass index is 21.35 kg/m² as calculated from the following:    Height as of 8/23/21: 5' 1\" (1.549 m). Weight as of 8/23/21: 51.3 kg (113 lb). Intake/Output Summary (Last 24 hours) at 8/29/2021 1302  Last data filed at 8/28/2021 2307  Gross per 24 hour   Intake 1000 ml   Output --   Net 1000 ml         General:    Well nourished but thin. Mild SOB at rest.  Head:  Normocephalic, atraumatic  Eyes:  Sclerae appear normal.  Pupils equally round. HENT:  Nares appear normal, no drainage. Moist mucous membranes  Neck:  No restricted ROM. Trachea midline  CV:   RRR. No m/r/g. No JVD  Lungs:   CTAB. No wheezing, rhonchi, or rales. Appears even, mild SOB at rest.   Abdomen: Bowel sounds present. Soft, nontender. Distended, tense ascites. Extremities: Warm and dry. No cyanosis or clubbing. Trace b/l LE pitting edema. Skin:     No rashes. Normal turgor. Normal coloration  Neuro:  Cranial nerves II-XII grossly intact. Sensation intact  Psych:  Normal mood and affect.   Alert and oriented x3    Data Ordered and Personally Reviewed:    Last 24hr Labs:  Recent Results (from the past 24 hour(s))   CBC WITH AUTOMATED DIFF    Collection Time: 08/28/21  9:05 PM   Result Value Ref Range    WBC 14.7 (H) 4.3 - 11.1 K/uL    RBC 3.81 (L) 4.05 - 5.2 M/uL    HGB 12.1 11.7 - 15.4 g/dL    HCT 34.0 (L) 35.8 - 46.3 %    MCV 89.2 79.6 - 97.8 FL    MCH 31.8 26.1 - 32.9 PG    MCHC 35.6 (H) 31.4 - 35.0 g/dL    RDW 22.7 (H) 11.9 - 14.6 %    PLATELET 558 497 - 756 K/uL    MPV 10.5 9.4 - 12.3 FL    ABSOLUTE NRBC 0.00 0.0 - 0.2 K/uL    DF AUTOMATED      NEUTROPHILS 79 (H) 43 - 78 %    LYMPHOCYTES 12 (L) 13 - 44 %    MONOCYTES 8 4.0 - 12.0 %    EOSINOPHILS 1 0.5 - 7.8 %    BASOPHILS 0 0.0 - 2.0 %    IMMATURE GRANULOCYTES 0 0.0 - 5.0 %    ABS. NEUTROPHILS 11.6 (H) 1.7 - 8.2 K/UL    ABS. LYMPHOCYTES 1.8 0.5 - 4.6 K/UL    ABS. MONOCYTES 1.1 0.1 - 1.3 K/UL    ABS. EOSINOPHILS 0.2 0.0 - 0.8 K/UL    ABS. BASOPHILS 0.0 0.0 - 0.2 K/UL    ABS. IMM. GRANS. 0.1 0.0 - 0.5 K/UL   METABOLIC PANEL, COMPREHENSIVE    Collection Time: 08/28/21  9:05 PM   Result Value Ref Range    Sodium 127 (L) 136 - 145 mmol/L    Potassium 4.6 3.5 - 5.1 mmol/L    Chloride 95 (L) 98 - 107 mmol/L    CO2 22 21 - 32 mmol/L    Anion gap 10 7 - 16 mmol/L    Glucose 111 (H) 65 - 100 mg/dL    BUN 18 6 - 23 MG/DL    Creatinine 0.98 0.6 - 1.0 MG/DL    GFR est AA >60 >60 ml/min/1.73m2    GFR est non-AA >60 >60 ml/min/1.73m2    Calcium 9.3 8.3 - 10.4 MG/DL    Bilirubin, total 5.2 (H) 0.2 - 1.1 MG/DL    ALT (SGPT) 71 (H) 12 - 65 U/L    AST (SGOT) 147 (H) 15 - 37 U/L    Alk.  phosphatase 194 (H) 50 - 136 U/L    Protein, total 7.0 6.3 - 8.2 g/dL    Albumin 3.1 (L) 3.5 - 5.0 g/dL    Globulin 3.9 (H) 2.3 - 3.5 g/dL    A-G Ratio 0.8 (L) 1.2 - 3.5     LIPASE    Collection Time: 08/28/21  9:05 PM   Result Value Ref Range    Lipase 578 (H) 73 - 393 U/L   PROCALCITONIN    Collection Time: 08/28/21  9:05 PM   Result Value Ref Range    Procalcitonin 0.18 ng/mL   PROTHROMBIN TIME + INR    Collection Time: 08/28/21  9:05 PM   Result Value Ref Range    Prothrombin time 17.0 (H) 12.6 - 14.5 sec    INR 1.3     HCG URINE, QL. - POC    Collection Time: 08/28/21  9:56 PM   Result Value Ref Range    Pregnancy test,urine (POC) Negative NEG     URINE MICROSCOPIC    Collection Time: 08/28/21 10:37 PM   Result Value Ref Range    WBC 3-5 0 /hpf    RBC 3-5 0 /hpf    Epithelial cells 3-5 0 /hpf    Bacteria 0 0 /hpf    Casts 0 0 /lpf    Crystals, urine 0 0 /LPF    Mucus 0 0 /lpf   LACTIC ACID    Collection Time: 08/28/21 10:37 PM   Result Value Ref Range    Lactic acid 2.6 (H) 0.4 - 2.0 MMOL/L   LACTIC ACID    Collection Time: 08/29/21 12:31 AM   Result Value Ref Range    Lactic acid 2.2 (H) 0.4 - 2.0 MMOL/L   METABOLIC PANEL, COMPREHENSIVE    Collection Time: 08/29/21  4:41 AM   Result Value Ref Range    Sodium 128 (L) 136 - 145 mmol/L    Potassium 4.4 3.5 - 5.1 mmol/L    Chloride 99 98 - 107 mmol/L    CO2 24 21 - 32 mmol/L    Anion gap 5 (L) 7 - 16 mmol/L    Glucose 109 (H) 65 - 100 mg/dL    BUN 19 6 - 23 MG/DL    Creatinine 0.72 0.6 - 1.0 MG/DL    GFR est AA >60 >60 ml/min/1.73m2    GFR est non-AA >60 >60 ml/min/1.73m2    Calcium 7.6 (L) 8.3 - 10.4 MG/DL    Bilirubin, total 5.1 (H) 0.2 - 1.1 MG/DL    ALT (SGPT) 62 12 - 65 U/L    AST (SGOT) 114 (H) 15 - 37 U/L    Alk. phosphatase 161 (H) 50 - 136 U/L    Protein, total 6.0 (L) 6.3 - 8.2 g/dL    Albumin 2.8 (L) 3.5 - 5.0 g/dL    Globulin 3.2 2.3 - 3.5 g/dL    A-G Ratio 0.9 (L) 1.2 - 3.5     CBC W/O DIFF    Collection Time: 08/29/21  4:41 AM   Result Value Ref Range    WBC 15.2 (H) 4.3 - 11.1 K/uL    RBC 3.40 (L) 4.05 - 5.2 M/uL    HGB 10.9 (L) 11.7 - 15.4 g/dL    HCT 31.3 (L) 35.8 - 46.3 %    MCV 92.1 79.6 - 97.8 FL    MCH 32.1 26.1 - 32.9 PG    MCHC 34.8 31.4 - 35.0 g/dL    RDW 22.7 (H) 11.9 - 14.6 %    PLATELET 091 936 - 919 K/uL    MPV 11.0 9.4 - 12.3 FL    ABSOLUTE NRBC 0.00 0.0 - 0.2 K/uL       All Micro Results     Procedure Component Value Units Date/Time    CULTURE, BODY FLUID Dianne Perkins STAIN [[id number]]     Order Status: Sent Specimen:  Body Fluid from Ascitic Fluid           Current Meds:  Current Facility-Administered Medications   Medication Dose Route Frequency    cetirizine (ZYRTEC) tablet 10 mg  10 mg Oral DAILY    FLUoxetine (PROzac) capsule 40 mg  40 mg Oral DAILY    valACYclovir (VALTREX) tablet 1,000 mg  1,000 mg Oral DAILY    pantoprazole (PROTONIX) tablet 40 mg  40 mg Oral ACB    furosemide (LASIX) tablet 20 mg  20 mg Oral DAILY    spironolactone (ALDACTONE) tablet 50 mg  50 mg Oral DAILY    sodium chloride (NS) flush 5-40 mL  5-40 mL IntraVENous Q8H    sodium chloride (NS) flush 5-40 mL  5-40 mL IntraVENous PRN    polyethylene glycol (MIRALAX) packet 17 g  17 g Oral DAILY PRN    ondansetron (ZOFRAN ODT) tablet 4 mg  4 mg Oral Q8H PRN    Or    ondansetron (ZOFRAN) injection 4 mg  4 mg IntraVENous Q6H PRN    morphine injection 2 mg  2 mg IntraVENous Q2H PRN       Other Studies:    CT ABD PELV WO CONT    Result Date: 8/29/2021  EXAM: CT ABD PELV WO CONT HISTORY: Severe abdominal pain, elevated WBC, elevated LA. TECHNIQUE: Axial images of the abdomen and pelvis were performed without intravenous contrast. Images were obtained in the axial plane and coronal reformatted images were created and reviewed. Dose reduction technique used: Automated exposure control/Adjustment of the mA and/or kV according to patient size/Use of iterative reconstruction technique. COMPARISON: 6/26/2021 FINDINGS: Visualized lung bases show a small right pleural effusion. There is mild right basilar atelectasis. Mediastinum are unremarkable. The liver shows mildly nodular margins. There is no discrete mass, allowing for lack of intravenous contrast. The spleen, pancreas, adrenal glands, and kidneys, are within normal limits. The bladder is not distended and not assessed. Visualization of the gallbladder is limited. Distal esophagus and stomach are unremarkable. Small and large bowel are without evidence of obstruction. The appendix is surgically absent. Lack of intravenous contrast limits assessment of the bowel wall; however, as seen there is no obvious thickening. There is a moderate amount of retained fecal material in the colon.  There is a large volume of ascites. This has increased significantly. No evidence of free air. No obvious pathologic adenopathy. No abdominal aortic aneurysm. Osseous structures are without evidence of acute fracture or suspicious lesion. There is now a large volume of ascites. This has increased significantly in the interval. Interval development of a small right pleural effusion with mild right basilar atelectasis. No obvious bowel obstruction or visible bowel wall thickening. Other findings as above. Signed:  [facility] MD Ana    Part of this note may have been written by using a voice dictation software. The note has been proof read but may still contain some grammatical/other typographical errors.

## 2021-08-29 NOTE — ASSESSMENT & PLAN NOTE
LFTs are more elevated than recent with t.bili of 5.2 currently.   - Consult with GI  - Follow CMP  - Checking INR to calculate MELD score, *INR returned at 1.2, MELD score is 23

## 2021-08-29 NOTE — PROGRESS NOTES
08/29/21 0110   Dual Skin Pressure Injury Assessment   Dual Skin Pressure Injury Assessment WDL   Second Care Provider (Based on [address] [place] [address]) Geeta Coyne, RN   Skin Integumentary   Skin Integumentary (WDL) X    Pressure  Injury Documentation No Pressure Injury Noted-Pressure Ulcer Prevention Initiated   Skin Color Jaundiced  (to both sclera)   Skin Condition/Temp Warm;Dry   Skin Integrity Intact

## 2021-08-29 NOTE — PROGRESS NOTES
Problem: Falls - Risk of  Goal: *Absence of Falls  Description: Document Zurdo Sites Fall Risk and appropriate interventions in the flowsheet.   Outcome: Progressing Towards Goal  Note: Fall Risk Interventions:            Medication Interventions: Teach patient to arise slowly

## 2021-08-29 NOTE — ED TRIAGE NOTES
Pt to er c/o abd pain and back pain since approx 1300 today, sts cirrhosis of liver stage 4, waiting to be put on list for transplant.   4.3 liters of fluid removed on monday

## 2021-08-29 NOTE — PROGRESS NOTES
Am assessment completed. Pt is alert and oriented x 4. abd is distended. Active bowel sounds. Verbalizes needs well. Denies any pain at this time.

## 2021-08-29 NOTE — ASSESSMENT & PLAN NOTE
Patient has known cirrhosis, obvious distention on exam.  Likely with recurrence of ascites since last paracentesis on Monday.   - Ordered CT A/P   - Consult with GI  - Will likely need paracentesis  - Concern for possible SBP, but will defer antibiotics until able to obtain ascitic fluid sample, as patient is stable and not septic  - IV morphine prn severe pain

## 2021-08-29 NOTE — PROGRESS NOTES
SW reviewed patient's chart and conducted a baseline assessment. Discharge plan at this time is as follows:     Care Management Interventions  PCP Verified by CM: Yes  Mode of Transport at Discharge: Self  Transition of Care Consult (CM Consult): Discharge Planning  Current Support Network: Own Home  Confirm Follow Up Transport: Family  Discharge Location  Discharge Placement: Home with family assistance      *Please note that discharge plans can change throughout an inpatient admission.  Ensure that you are referring to the most recent social work/nurse case management note for current discharge plan*     Alejandra Ruffin, [address] [place] [address] Work   [facility]    * [email or web address]

## 2021-08-29 NOTE — PROGRESS NOTES
SAILAJA familiar with patient from previous admission. No needs from case management at this time. MD plans for diagnostic/therapeutic paracentesis tomorrow.      Kika Rich LMSW    [facility]    * [email or web address]

## 2021-08-29 NOTE — H&P
Hospitalist History and Physical   Admit Date:  2021  8:57 PM   Name:  Lizz Wilson   Age:  36 y.o. Sex:  female  :  1980   MRN:  [id number]     Presenting Complaint: abdominal pain  Reason(s) for Admission: Abdominal pain [R10.9]     History of Present Illness:   Lizz Wilson is a 36 y.o. female with medical history of cirrhosis who presented to ED with abdominal pain. States that the pain started all around her belly this afternoon. She underwent paracentesis on Monday with >4L removed. Denies any fevers. On ER workup, patient has a mild leukocytosis and elevated lactic acid. Hospitalist asked to admit. Patient appears very uncomfortable on interview. She describes pain all throughout her abdomen. She reports last BM was yesterday. Feels more swollen in the last 2 days as well. She reports the mild scleral icterus is chronic for her. Review of Systems:  10 systems reviewed and negative except as noted in HPI. Assessment & Plan:   Abdominal pain  Patient has known cirrhosis, obvious distention on exam.  Likely with recurrence of ascites since last paracentesis on Monday. - Ordered CT A/P   - Consult with GI  - Will likely need paracentesis  - Concern for possible SBP, but will defer antibiotics until able to obtain ascitic fluid sample, as patient is stable and not septic  - IV morphine prn severe pain    Cirrhosis of liver with ascites (HCC)  LFTs are more elevated than recent with t.bili of 5.2 currently.   - Consult with GI  - Follow CMP  - Checking INR to calculate MELD score, *INR returned at 1.2, MELD score is 23    * Elevated LFTs  LFTs are more elevated than recent with t.bili of 5.2.  - Monitor CMP    Elevated lactic acid level  Lactic acid is 2.6.  - IVFs  - Recheck LA in 6 hours after IVFs    Hyponatremia  Na is 127  - IVF  - Monitor Na level       Disposition: Inpatient    Diet: ADULT DIET Regular  VTE ppx: SCDs  Code status: FULL      Past medical history reviewed. Past Medical History:   Diagnosis Date    Depression     medications     GERD (gastroesophageal reflux disease)     medications    Liver disease 2021    patient states that \"something is wrong with my liver\" but states has not been diagnosed.  Low iron      Past surgical history reviewed. Past Surgical History:   Procedure Laterality Date    HX APPENDECTOMY      HX  SECTION      HX DILATION AND CURETTAGE      HX TONSILLECTOMY      IR PARACENTESIS ABD W IMAGE  2021    IR PARACENTESIS ABD W IMAGE  2021    IR PARACENTESIS ABD W IMAGE  2021      Allergies   Allergen Reactions    Sulfa (Sulfonamide Antibiotics) Rash      Social History     Tobacco Use    Smoking status: Never Smoker    Smokeless tobacco: Never Used   Substance Use Topics    Alcohol use: Not Currently      History reviewed. No pertinent family history. Family history reviewed and noncontributory to patient's acute condition; no relevant family history unless otherwise noted above. There is no immunization history on file for this patient. PTA Medications:  Current Outpatient Medications   Medication Instructions    cetirizine (ZYRTEC) 10 mg, Oral, DAILY    FLUoxetine (PROZAC) 40 mg, Oral, DAILY    furosemide (LASIX) 20 mg, Oral, DAILY    lidocaine (LIDODERM) 5 % Apply patch to the affected area for 12 hours a day and remove for 12 hours a day.     magnesium oxide (MAG-OX) 400 mg tablet DAILY    ondansetron (ZOFRAN ODT) 4 mg, Oral, EVERY 8 HOURS AS NEEDED    pantoprazole (PROTONIX) 40 mg, Oral, DAILY    spironolactone (ALDACTONE) 50 mg, Oral, DAILY    therapeutic multivitamin (THERAGRAN) tablet 1 Tablet, DAILY    valACYclovir (VALTREX) 1,000 mg, Oral, DAILY       Objective:     Patient Vitals for the past 24 hrs:   Temp Pulse Resp BP SpO2   21 0110 98.9 °F (37.2 °C) (!) 103 20 106/66 96 %   21 0031 -- 99 22 (!) 104/58 100 %   21 2307 -- 93 18 (!) 102/53 100 %   08/28/21 2243 -- -- -- -- 100 %   08/28/21 2040 99 °F (37.2 °C) 95 15 (!) 101/47 100 %     Oxygen Therapy  O2 Sat (%): 96 % (08/29/21 0110)  Pulse via Oximetry: 94 beats per minute (08/28/21 2243)  O2 Device: None (08/29/21 0031)    Estimated body mass index is 21.35 kg/m² as calculated from the following:    Height as of 8/23/21: 5' 1\" (1.549 m). Weight as of 8/23/21: 51.3 kg (113 lb). Intake/Output Summary (Last 24 hours) at 8/29/2021 0206  Last data filed at 8/28/2021 2307  Gross per 24 hour   Intake 1000 ml   Output --   Net 1000 ml         Physical Exam:  General:    Well nourished. Uncomfortable appearin. Head:  Normocephalic, atraumatic  Eyes:  Sclerae icteric. Pupils equally round. HENT:  Nares appear normal, no drainage. Moist mucous membranes  Neck:  No restricted ROM. Trachea midline  CV:   RRR. S1/S2 auscultated  Lungs:   CTAB. No wheezing, rhonchi, or rales. Appears even, unlabored  Abdomen: Bowel sounds present. Noticeably distended and firm. TTP throughout. Extremities: Warm and dry. No cyanosis or clubbing. No edema. Skin:     No rashes. Normal turgor. Normal coloration  Neuro:  Cranial nerves II-XII grossly intact. Sensation intact  Psych:  Normal mood and affect.   Alert and oriented x3    Data Ordered and Personally Reviewed:    Last 24hr Labs:  Recent Results (from the past 24 hour(s))   CBC WITH AUTOMATED DIFF    Collection Time: 08/28/21  9:05 PM   Result Value Ref Range    WBC 14.7 (H) 4.3 - 11.1 K/uL    RBC 3.81 (L) 4.05 - 5.2 M/uL    HGB 12.1 11.7 - 15.4 g/dL    HCT 34.0 (L) 35.8 - 46.3 %    MCV 89.2 79.6 - 97.8 FL    MCH 31.8 26.1 - 32.9 PG    MCHC 35.6 (H) 31.4 - 35.0 g/dL    RDW 22.7 (H) 11.9 - 14.6 %    PLATELET 234 516 - 216 K/uL    MPV 10.5 9.4 - 12.3 FL    ABSOLUTE NRBC 0.00 0.0 - 0.2 K/uL    DF AUTOMATED      NEUTROPHILS 79 (H) 43 - 78 %    LYMPHOCYTES 12 (L) 13 - 44 %    MONOCYTES 8 4.0 - 12.0 %    EOSINOPHILS 1 0.5 - 7.8 % BASOPHILS 0 0.0 - 2.0 %    IMMATURE GRANULOCYTES 0 0.0 - 5.0 %    ABS. NEUTROPHILS 11.6 (H) 1.7 - 8.2 K/UL    ABS. LYMPHOCYTES 1.8 0.5 - 4.6 K/UL    ABS. MONOCYTES 1.1 0.1 - 1.3 K/UL    ABS. EOSINOPHILS 0.2 0.0 - 0.8 K/UL    ABS. BASOPHILS 0.0 0.0 - 0.2 K/UL    ABS. IMM. GRANS. 0.1 0.0 - 0.5 K/UL   METABOLIC PANEL, COMPREHENSIVE    Collection Time: 08/28/21  9:05 PM   Result Value Ref Range    Sodium 127 (L) 136 - 145 mmol/L    Potassium 4.6 3.5 - 5.1 mmol/L    Chloride 95 (L) 98 - 107 mmol/L    CO2 22 21 - 32 mmol/L    Anion gap 10 7 - 16 mmol/L    Glucose 111 (H) 65 - 100 mg/dL    BUN 18 6 - 23 MG/DL    Creatinine 0.98 0.6 - 1.0 MG/DL    GFR est AA >60 >60 ml/min/1.73m2    GFR est non-AA >60 >60 ml/min/1.73m2    Calcium 9.3 8.3 - 10.4 MG/DL    Bilirubin, total 5.2 (H) 0.2 - 1.1 MG/DL    ALT (SGPT) 71 (H) 12 - 65 U/L    AST (SGOT) 147 (H) 15 - 37 U/L    Alk.  phosphatase 194 (H) 50 - 136 U/L    Protein, total 7.0 6.3 - 8.2 g/dL    Albumin 3.1 (L) 3.5 - 5.0 g/dL    Globulin 3.9 (H) 2.3 - 3.5 g/dL    A-G Ratio 0.8 (L) 1.2 - 3.5     LIPASE    Collection Time: 08/28/21  9:05 PM   Result Value Ref Range    Lipase 578 (H) 73 - 393 U/L   PROCALCITONIN    Collection Time: 08/28/21  9:05 PM   Result Value Ref Range    Procalcitonin 0.18 ng/mL   HCG URINE, QL. - POC    Collection Time: 08/28/21  9:56 PM   Result Value Ref Range    Pregnancy test,urine (POC) Negative NEG     URINE MICROSCOPIC    Collection Time: 08/28/21 10:37 PM   Result Value Ref Range    WBC 3-5 0 /hpf    RBC 3-5 0 /hpf    Epithelial cells 3-5 0 /hpf    Bacteria 0 0 /hpf    Casts 0 0 /lpf    Crystals, urine 0 0 /LPF    Mucus 0 0 /lpf   LACTIC ACID    Collection Time: 08/28/21 10:37 PM   Result Value Ref Range    Lactic acid 2.6 (H) 0.4 - 2.0 MMOL/L   LACTIC ACID    Collection Time: 08/29/21 12:31 AM   Result Value Ref Range    Lactic acid 2.2 (H) 0.4 - 2.0 MMOL/L       All Micro Results     Procedure Component Value Units Date/Time    CULTURE, BODY FLUID W Antonio Bowman [[id number]]     Order Status: Sent Specimen: Body Fluid from Ascitic Fluid           Other Studies:  No results found.       Medications Administered     morphine injection 2 mg     Admin Date  08/28/2021 Action  Given Dose  2 mg Route  IntraVENous Administered By  Kerri Ramos RN          morphine injection 4 mg     Admin Date  08/28/2021 Action  Given Dose  4 mg Route  IntraVENous Administered By  Carolyn Mederos RN          ondansetron [facility] injection 4 mg     Admin Date  08/28/2021 Action  Given Dose  4 mg Route  IntraVENous Administered By  Kerri Ramos RN          sodium chloride 0.9 % bolus infusion 1,000 mL     Admin Date  08/28/2021 Action  New Bag Dose  1,000 mL Rate  1,000 mL/hr Route  IntraVENous Administered By  Kerri Ramos RN                  Signed:  Calleen Jeans, MD

## 2021-08-29 NOTE — ED NOTES
TRANSFER - OUT REPORT:    Verbal report given to Bonnie MARTINEZ(name) on Omnchristel Dillavou  being transferred to [facility](unit) for routine progression of care       Report consisted of patients Situation, Background, Assessment and   Recommendations(SBAR). Information from the following report(s) SBAR, Kardex, ED Summary, Intake/Output, MAR and Recent Results was reviewed with the receiving nurse. Lines:   Peripheral IV 08/28/21 Anterior;Left;Proximal Forearm (Active)       Peripheral IV 08/29/21 Right Wrist (Active)   Site Assessment Clean, dry, & intact 08/29/21 0100   Phlebitis Assessment 0 08/29/21 0100   Infiltration Assessment 0 08/29/21 0100   Dressing Status Clean, dry, & intact 08/29/21 0100   Dressing Type Tape;Transparent 08/29/21 0100   Hub Color/Line Status Pink;Flushed;Patent 08/29/21 0100        Opportunity for questions and clarification was provided.       Patient transported with:   [employer]

## 2021-08-29 NOTE — PROGRESS NOTES
Consult made via text to GI, Dr. Melba Be for tomorrow. He is not on in the AM so consult will need to be called on Monday morning.

## 2021-08-30 ENCOUNTER — HOSPITAL ENCOUNTER (OUTPATIENT)
Dept: INTERVENTIONAL RADIOLOGY/VASCULAR | Age: 41
Discharge: HOME | End: 2021-08-30
Attending: INTERNAL MEDICINE
Payer: COMMERCIAL

## 2021-08-30 VITALS
DIASTOLIC BLOOD PRESSURE: 54 MMHG | TEMPERATURE: 97.6 F | HEART RATE: 101 BPM | OXYGEN SATURATION: 98 % | SYSTOLIC BLOOD PRESSURE: 99 MMHG | RESPIRATION RATE: 18 BRPM

## 2021-08-30 LAB
ALBUMIN FLD-MCNC: 0.3 G/DL
ALBUMIN SERPL-MCNC: 2.7 G/DL (ref 3.5–5)
ALBUMIN/GLOB SERPL: 0.9 {RATIO} (ref 1.2–3.5)
ALP SERPL-CCNC: 147 U/L (ref 50–130)
ALT SERPL-CCNC: 69 U/L (ref 12–65)
ANION GAP SERPL CALC-SCNC: 7 MMOL/L (ref 7–16)
APPEARANCE FLD: CLEAR
AST SERPL-CCNC: 129 U/L (ref 15–37)
BASOPHILS # BLD: 0 K/UL (ref 0–0.2)
BASOPHILS NFR BLD: 0 % (ref 0–2)
BILIRUB SERPL-MCNC: 6.7 MG/DL (ref 0.2–1.1)
BUN SERPL-MCNC: 18 MG/DL (ref 6–23)
CALCIUM SERPL-MCNC: 8.5 MG/DL (ref 8.3–10.4)
CHLORIDE SERPL-SCNC: 97 MMOL/L (ref 98–107)
CO2 SERPL-SCNC: 22 MMOL/L (ref 21–32)
COLOR FLD: YELLOW
CREAT SERPL-MCNC: 0.66 MG/DL (ref 0.6–1)
DIFFERENTIAL METHOD BLD: ABNORMAL
EOSINOPHIL # BLD: 0.3 K/UL (ref 0–0.8)
EOSINOPHIL NFR BLD: 2 % (ref 0.5–7.8)
ERYTHROCYTE [DISTWIDTH] IN BLOOD BY AUTOMATED COUNT: 22.9 % (ref 11.9–14.6)
GLOBULIN SER CALC-MCNC: 3.1 G/DL (ref 2.3–3.5)
GLUCOSE SERPL-MCNC: 108 MG/DL (ref 65–100)
HCT VFR BLD AUTO: 29.4 % (ref 35.8–46.3)
HGB BLD-MCNC: 10.6 G/DL (ref 11.7–15.4)
IMM GRANULOCYTES # BLD AUTO: 0.1 K/UL (ref 0–0.5)
IMM GRANULOCYTES NFR BLD AUTO: 1 % (ref 0–5)
LYMPHOCYTES # BLD: 2 K/UL (ref 0.5–4.6)
LYMPHOCYTES NFR BLD: 13 % (ref 13–44)
MCH RBC QN AUTO: 32.6 PG (ref 26.1–32.9)
MCHC RBC AUTO-ENTMCNC: 36.1 G/DL (ref 31.4–35)
MCV RBC AUTO: 90.5 FL (ref 79.6–97.8)
MONOCYTES # BLD: 1.6 K/UL (ref 0.1–1.3)
MONOCYTES NFR BLD: 11 % (ref 4–12)
NEUTS SEG # BLD: 11.2 K/UL (ref 1.7–8.2)
NEUTS SEG NFR BLD: 74 % (ref 43–78)
NRBC # BLD: 0 K/UL (ref 0–0.2)
NUC CELL # FLD: <100 /CU MM
PLATELET # BLD AUTO: 201 K/UL (ref 150–450)
PMV BLD AUTO: 11.8 FL (ref 9.4–12.3)
POTASSIUM SERPL-SCNC: 4.6 MMOL/L (ref 3.5–5.1)
PROT SERPL-MCNC: 5.8 G/DL (ref 6.3–8.2)
RBC # BLD AUTO: 3.25 M/UL (ref 4.05–5.2)
RBC # FLD: <1000 /CU MM
SODIUM SERPL-SCNC: 126 MMOL/L (ref 136–145)
SPECIMEN SOURCE FLD: NORMAL
SPECIMEN SOURCE FLD: NORMAL
WBC # BLD AUTO: 15.2 K/UL (ref 4.3–11.1)

## 2021-08-30 PROCEDURE — 85025 COMPLETE CBC W/AUTO DIFF WBC: CPT

## 2021-08-30 PROCEDURE — 82042 OTHER SOURCE ALBUMIN QUAN EA: CPT

## 2021-08-30 PROCEDURE — 89050 BODY FLUID CELL COUNT: CPT

## 2021-08-30 PROCEDURE — 77030014146 HC TY THORCENT/PARACENT BD -B

## 2021-08-30 PROCEDURE — 87205 SMEAR GRAM STAIN: CPT

## 2021-08-30 PROCEDURE — 77030010507 HC ADH SKN DERMBND J&J -B

## 2021-08-30 PROCEDURE — 74011250637 HC RX REV CODE- 250/637: Performed by: FAMILY MEDICINE

## 2021-08-30 PROCEDURE — 49083 ABD PARACENTESIS W/IMAGING: CPT

## 2021-08-30 PROCEDURE — 0W9G3ZZ DRAINAGE OF PERITONEAL CAVITY, PERCUTANEOUS APPROACH: ICD-10-PCS | Performed by: RADIOLOGY

## 2021-08-30 PROCEDURE — 65270000029 HC RM PRIVATE

## 2021-08-30 PROCEDURE — 36415 COLL VENOUS BLD VENIPUNCTURE: CPT

## 2021-08-30 PROCEDURE — 74011250637 HC RX REV CODE- 250/637: Performed by: PHYSICIAN ASSISTANT

## 2021-08-30 PROCEDURE — 80053 COMPREHEN METABOLIC PANEL: CPT

## 2021-08-30 RX ORDER — POLYETHYLENE GLYCOL 3350 17 G/17G
17 POWDER, FOR SOLUTION ORAL 2 TIMES DAILY
Status: DISCONTINUED | OUTPATIENT
Start: 2021-08-30 | End: 2021-09-02 | Stop reason: HOSPADM

## 2021-08-30 RX ADMIN — POLYETHYLENE GLYCOL 3350 17 G: 17 POWDER, FOR SOLUTION ORAL at 18:00

## 2021-08-30 RX ADMIN — FUROSEMIDE 20 MG: 20 TABLET ORAL at 09:16

## 2021-08-30 RX ADMIN — POLYETHYLENE GLYCOL 3350 17 G: 17 POWDER, FOR SOLUTION ORAL at 13:00

## 2021-08-30 RX ADMIN — FLUOXETINE 40 MG: 20 CAPSULE ORAL at 09:15

## 2021-08-30 RX ADMIN — Medication 10 ML: at 05:45

## 2021-08-30 RX ADMIN — CETIRIZINE HYDROCHLORIDE 10 MG: 10 TABLET, FILM COATED ORAL at 09:15

## 2021-08-30 RX ADMIN — HYDROCODONE BITARTRATE AND HOMATROPINE METHYLBROMIDE 5 ML: 5; 1.5 SOLUTION ORAL at 19:37

## 2021-08-30 RX ADMIN — PANTOPRAZOLE SODIUM 40 MG: 40 TABLET, DELAYED RELEASE ORAL at 06:01

## 2021-08-30 RX ADMIN — HYDROCODONE BITARTRATE AND HOMATROPINE METHYLBROMIDE 5 ML: 5; 1.5 SOLUTION ORAL at 09:15

## 2021-08-30 RX ADMIN — SPIRONOLACTONE 50 MG: 25 TABLET ORAL at 09:15

## 2021-08-30 RX ADMIN — Medication 10 ML: at 21:55

## 2021-08-30 RX ADMIN — HYDROCODONE BITARTRATE AND HOMATROPINE METHYLBROMIDE 5 ML: 5; 1.5 SOLUTION ORAL at 01:14

## 2021-08-30 RX ADMIN — HYDROCODONE BITARTRATE AND HOMATROPINE METHYLBROMIDE 5 ML: 5; 1.5 SOLUTION ORAL at 05:45

## 2021-08-30 RX ADMIN — VALACYCLOVIR HYDROCHLORIDE 1000 MG: 500 TABLET, FILM COATED ORAL at 09:15

## 2021-08-30 NOTE — PROGRESS NOTES
TRANSFER - OUT REPORT:    Verbal report given to Estella Martinez RN(name) on [facility]  In IR Recovery 7(unit) for routine post - op       Report consisted of patients Situation, Background, Assessment and   Recommendations(SBAR). Information from the following report(s) SBAR and Procedure Summary was reviewed with the receiving nurse. Opportunity for questions and clarification was provided. Pt tolerated procedure well. Visit Vitals  BP (!) 106/59   Pulse (!) 102   Temp 97.6 °F (36.4 °C)   Resp 18   SpO2 96%     Past Medical History:   Diagnosis Date    Depression     medications     GERD (gastroesophageal reflux disease)     medications    Liver disease 03/2021    patient states that \"something is wrong with my liver\" but states has not been diagnosed.     Low iron      Peripheral IV 08/28/21 Anterior;Left;Proximal Forearm (Active)   Site Assessment Clean, dry, & intact 08/30/21 0717   Phlebitis Assessment 0 08/30/21 0717   Infiltration Assessment 0 08/30/21 0717   Dressing Status Clean, dry, & intact 08/30/21 0717   Dressing Type Tape;Transparent 08/29/21 1915   Hub Color/Line Status Flushed;Patent 08/29/21 1915       Peripheral IV 08/29/21 Right Wrist (Active)   Site Assessment Clean, dry, & intact 08/30/21 0717   Phlebitis Assessment 0 08/30/21 0717   Infiltration Assessment 0 08/30/21 0717   Dressing Status Clean, dry, & intact 08/29/21 1915   Dressing Type Tape;Transparent 08/29/21 1915   Hub Color/Line Status Pink;Flushed;Patent 08/29/21 1915

## 2021-08-30 NOTE — PROCEDURES
Department of Interventional Radiology  [phone]        Interventional Radiology Brief Procedure Note    Patient: Lorri Floyd MRN: [id number]  SSN: [id number]    YOB: 1980  Age: 36 y.o. Sex: female      Date of Procedure: 8/30/2021    Pre-Procedure Diagnosis: Cirrhosis. Portal HTN. Recurrent ascites--4 previous paracentesis. Jaundice. Post-Procedure Diagnosis: SAME    Procedure(s): Paracentesis    Brief Description of Procedure: as above    Performed By: Claudeen Shack, MD     Assistants: None    Anesthesia:Lidocaine    Estimated Blood Loss: Less than 10ml    Specimens:  Cytology    Implants:  None    Findings: Moderate volume ascites     Complications: None    Recommendations: TIPS evaluation.       Follow Up: PRN    Signed By: Claudeen Shack, MD     August 30, 2021
Fahad Myles(Attending)

## 2021-08-30 NOTE — PROGRESS NOTES
Hospitalist Progress Note     Admit Date:  2021  8:57 PM   Name:  Rubina Mcknight   Age:  36 y.o.  :  1980   MRN:  [id number]     Presenting Complaint: Abdominal Pain and Back Pain    Initial Admission Diagnosis: Abdominal pain [R10.9]     Assessment and Plan:   # Abdominal pain in setting of cryptogenic cirrhosis and ascites              - S/p paracentesis , 4L removed, awaiting fluid analysis. Remains afebrile, WBCs the same. GI to see. # Cryptogenic cirrhosis with large volume ascites              - Seen by GI previously, has appointment in a few weeks at [facility], hopeful to be put on transplant list. TIPS eval per IR. S/p 4L para today. Diuretics, same dose due to low BPs. [facility] appt for transplant eval.       # Elevated bilirubin/transaminitis              - 2/2 cirrhosis.     # Elevated LA              - Improved, prone to lactic acidosis with liver disease.     # HypoNa              - Likely from cirrhosis. Monitor. Discharge Planning: Home when able. Diet:  ADULT DIET Regular; Low Sodium (2 gm)  DVT PPx: SCDs  Code status: Full Code    Active Hospital Problems    Diagnosis Date Noted    Abdominal pain 2021    Hyponatremia 2021    Elevated LFTs 2021    Elevated lactic acid level 2021    Cirrhosis of liver with ascites ([facility] [address] 2021       Hospital Course:   Ms. Annalee Nolen is a very nice 37 y/o WF recently diagnosed with cryptogenic cirrhosis, s/p para x4 since 2021, who was admitted on  with progressive abdominal pain and concern for SBP. Afebrile, WBCs 14k, CT a/p w/o acute process. Abx held given stability. Para on  with 4L removed. GI following. 24hr Events/Subjective (21):   : Back from paracentesis, 4L off, did well and abdo feels much better. Less distended. No vomiting or nausea. Tolerated broth but wants to keep that the same for now. No fevers, chest pain, diarrhea.     Objective:     Patient Vitals for the past 24 hrs:   Temp Pulse Resp BP SpO2   08/30/21 0717 98.8 °F (37.1 °C) 95 18 103/63 93 %   08/30/21 0301 98.4 °F (36.9 °C) 91 18 100/63 94 %   08/29/21 2306 98.7 °F (37.1 °C) 94 18 99/63 94 %   08/29/21 1915 99.3 °F (37.4 °C) 97 18 (!) 101/57 97 %   08/29/21 1605 98.3 °F (36.8 °C) 97 18 104/64 98 %     Oxygen Therapy  O2 Sat (%): 93 % (08/30/21 0717)  Pulse via Oximetry: 94 beats per minute (08/28/21 2243)  O2 Device: None (08/29/21 0031)    Estimated body mass index is 21.35 kg/m² as calculated from the following:    Height as of 8/23/21: 5' 1\" (1.549 m). Weight as of 8/23/21: 51.3 kg (113 lb). No intake or output data in the 24 hours ending 08/30/21 1221      General:    Well nourished, thin. No overt distress  Head:  Normocephalic, atraumatic  Eyes:  Sclerae appear jaundice. Pupils equally round. HENT:  Nares appear normal, no drainage. Moist mucous membranes  Neck:  No restricted ROM. Trachea midline  CV:   RRR. No m/r/g. No JVD  Lungs:   CTAB. No wheezing, rhonchi, or rales. Appears even, unlabored  Abdomen: Bowel sounds present. Soft, nontender. Mild distension but significantly improved from yesterday. Extremities: Warm and dry. No cyanosis or clubbing. No edema. Skin:     No rashes. Normal turgor. Normal coloration  Neuro:  Cranial nerves II-XII grossly intact. Sensation intact  Psych:  Normal mood and affect.   Alert and oriented x3    Data Ordered and Personally Reviewed:    Last 24hr Labs:  Recent Results (from the past 24 hour(s))   METABOLIC PANEL, COMPREHENSIVE    Collection Time: 08/30/21  4:55 AM   Result Value Ref Range    Sodium 126 (L) 136 - 145 mmol/L    Potassium 4.6 3.5 - 5.1 mmol/L    Chloride 97 (L) 98 - 107 mmol/L    CO2 22 21 - 32 mmol/L    Anion gap 7 7 - 16 mmol/L    Glucose 108 (H) 65 - 100 mg/dL    BUN 18 6 - 23 MG/DL    Creatinine 0.66 0.6 - 1.0 MG/DL    GFR est AA >60 >60 ml/min/1.73m2    GFR est non-AA >60 >60 ml/min/1.73m2    Calcium 8.5 8.3 - 10.4 MG/DL Bilirubin, total 6.7 (H) 0.2 - 1.1 MG/DL    ALT (SGPT) 69 (H) 12 - 65 U/L    AST (SGOT) 129 (H) 15 - 37 U/L    Alk. phosphatase 147 (H) 50 - 130 U/L    Protein, total 5.8 (L) 6.3 - 8.2 g/dL    Albumin 2.7 (L) 3.5 - 5.0 g/dL    Globulin 3.1 2.3 - 3.5 g/dL    A-G Ratio 0.9 (L) 1.2 - 3.5     CBC WITH AUTOMATED DIFF    Collection Time: 08/30/21  4:55 AM   Result Value Ref Range    WBC 15.2 (H) 4.3 - 11.1 K/uL    RBC 3.25 (L) 4.05 - 5.2 M/uL    HGB 10.6 (L) 11.7 - 15.4 g/dL    HCT 29.4 (L) 35.8 - 46.3 %    MCV 90.5 79.6 - 97.8 FL    MCH 32.6 26.1 - 32.9 PG    MCHC 36.1 (H) 31.4 - 35.0 g/dL    RDW 22.9 (H) 11.9 - 14.6 %    PLATELET 998 598 - 384 K/uL    MPV 11.8 9.4 - 12.3 FL    ABSOLUTE NRBC 0.00 0.0 - 0.2 K/uL    DF AUTOMATED      NEUTROPHILS 74 43 - 78 %    LYMPHOCYTES 13 13 - 44 %    MONOCYTES 11 4.0 - 12.0 %    EOSINOPHILS 2 0.5 - 7.8 %    BASOPHILS 0 0.0 - 2.0 %    IMMATURE GRANULOCYTES 1 0.0 - 5.0 %    ABS. NEUTROPHILS 11.2 (H) 1.7 - 8.2 K/UL    ABS. LYMPHOCYTES 2.0 0.5 - 4.6 K/UL    ABS. MONOCYTES 1.6 (H) 0.1 - 1.3 K/UL    ABS. EOSINOPHILS 0.3 0.0 - 0.8 K/UL    ABS. BASOPHILS 0.0 0.0 - 0.2 K/UL    ABS. IMM. GRANS. 0.1 0.0 - 0.5 K/UL       All Micro Results     Procedure Component Value Units Date/Time    CULTURE, BODY FLUID Kaela Webb [[id number]]     Order Status: Sent Specimen:  Body Fluid from Ascitic Fluid           Current Meds:  Current Facility-Administered Medications   Medication Dose Route Frequency    polyethylene glycol (MIRALAX) packet 17 g  17 g Oral BID    cetirizine (ZYRTEC) tablet 10 mg  10 mg Oral DAILY    FLUoxetine (PROzac) capsule 40 mg  40 mg Oral DAILY    valACYclovir (VALTREX) tablet 1,000 mg  1,000 mg Oral DAILY    pantoprazole (PROTONIX) tablet 40 mg  40 mg Oral ACB    furosemide (LASIX) tablet 20 mg  20 mg Oral DAILY    spironolactone (ALDACTONE) tablet 50 mg  50 mg Oral DAILY    sodium chloride (NS) flush 5-40 mL  5-40 mL IntraVENous Q8H    sodium chloride (NS) flush 5-40 mL 5-40 mL IntraVENous PRN    ondansetron (ZOFRAN ODT) tablet 4 mg  4 mg Oral Q8H PRN    Or    ondansetron (ZOFRAN) injection 4 mg  4 mg IntraVENous Q6H PRN    morphine injection 2 mg  2 mg IntraVENous Q4H PRN    HYDROcodone-homatropine (HYCODAN) 5-1.5 mg/5 mL (5 mL) oral solution 5 mL  5 mL Oral Q4H PRN       Other Studies:    No results found. Signed:  Jane Thompson MD    Part of this note may have been written by using a voice dictation software. The note has been proof read but may still contain some grammatical/other typographical errors.

## 2021-08-30 NOTE — PROGRESS NOTES
TRANSFER - OUT REPORT:    Verbal report given to Piter Tobar RN(name) on Obed Ortega  being transferred to [facility](unit) for routine progression of care. Report consisted of patients Situation, Background, Assessment and   Recommendations(SBAR). Information from the following report(s) SBAR and Procedure Summary was reviewed with the receiving nurse. Opportunity for questions and clarification was provided. Pt tolerated procedure well. Visit Vitals  BP (!) 106/59   Pulse (!) 102   Temp 97.6 °F (36.4 °C)   Resp 18   SpO2 96%     Past Medical History:   Diagnosis Date    Depression     medications     GERD (gastroesophageal reflux disease)     medications    Liver disease 03/2021    patient states that \"something is wrong with my liver\" but states has not been diagnosed.     Low iron      Peripheral IV 08/28/21 Anterior;Left;Proximal Forearm (Active)   Site Assessment Clean, dry, & intact 08/30/21 0717   Phlebitis Assessment 0 08/30/21 0717   Infiltration Assessment 0 08/30/21 0717   Dressing Status Clean, dry, & intact 08/30/21 0717   Dressing Type Tape;Transparent 08/29/21 1915   Hub Color/Line Status Flushed;Patent 08/29/21 1915       Peripheral IV 08/29/21 Right Wrist (Active)   Site Assessment Clean, dry, & intact 08/30/21 0717   Phlebitis Assessment 0 08/30/21 0717   Infiltration Assessment 0 08/30/21 0717   Dressing Status Clean, dry, & intact 08/29/21 1915   Dressing Type Tape;Transparent 08/29/21 1915   Hub Color/Line Status Pink;Flushed;Patent 08/29/21 1915

## 2021-08-30 NOTE — PROGRESS NOTES
Problem: Falls - Risk of  Goal: *Absence of Falls  Description: Document Jocelynhussain [place] Fall Risk and appropriate interventions in the flowsheet.   Outcome: Progressing Towards Goal  Note: Fall Risk Interventions:            Medication Interventions: Teach patient to arise slowly                   Problem: Patient Education: Go to Patient Education Activity  Goal: Patient/Family Education  Outcome: Progressing Towards Goal

## 2021-08-30 NOTE — PROGRESS NOTES
TRANSFER - IN REPORT:    Verbal report received from [place] RN(name) on [facility]  being received from ED(unit) for routine progression of care      Report consisted of patients Situation, Background, Assessment and   Recommendations(SBAR). Information from the following report(s) SBAR, Kardex, ED Summary, [facility] and Recent Results was reviewed with the receiving nurse. Opportunity for questions and clarification was provided. Assessment completed upon patients arrival to unit and care assumed.

## 2021-08-30 NOTE — PROGRESS NOTES
Interventional Radiology Post Paracentesis/Thoracentesis Note    8/30/2021    Procedure(s): Ultrasound guided Therapeutic/diagnostic Paracentesis Performed with 8 Faro[place] catheter total volume 4110 ml. Samples sent to lab. Preliminary Findings: large yellow. Complications: None    Plan:  Observation, check labs if drawn.           Chest X-Ray:  no

## 2021-08-30 NOTE — CONSULTS
[facility] Consult Note       Primary GI Physician: Dr. Ana Lilia Albarado and Cole Nunes    Referring Provider:  Dr. Claudell Rattler Date:  8/30/2021    Admit Date:  8/28/2021    Chief Complaint:  Abdominal pain, ascites    Subjective:     History of Present Illness:  Patient is a 36 y.o. female with PMH including but not limited to GERD, Cryptogenic Cirrhosis (dx 3/2021), Daniel Callaway is seen in consultation at the request of Dr. Jrodan García for abdominal pain, leukocytosis, lactic acidosis. She has had complete liver serology work up and Liver biopsy.      She had recent [facility] admission 7/23-7/28/21 for ascites. She had undergone Paracentesis on 7/2/2021 with 1.8L removed and 7/27/21 with 3370 ml removed.  Last paracentesis was 8/23 removing 4.3 L. She presented to the ER on 8/28/21 with complaint of abdominal and back pain that started earlier that day. In ER, she was noted to have mild leukocytosis and elevated lactic acid. She was admitted by hospitalist and had CT a/p without contrast on 8/29/21 showing large volume ascites (significatnly increased compared to prior) and a moderate amount of retained fecal material. She is on lasix 20 mg daily, Aldactone 50 mg daily and following a low 2g sodium diet. She had LVP this morning 8/30 with removal of 4110 ml ascitic fluid. Now abdominal pain is improved. She reports intemrittenet N/V, today some ongoing nausea but no vomiting. She was having BMs with use of MiraLax but stool frequency decreased some and she thinks she needs to increase her MiraLax dosing. She did recently pass a blood clot mixed with stool x1 but otherwise denies GI bleeding. Mentation is normal per patient. Fluid studies are being sent for cell count/diff, albumin and culture. On 8/28, INR 1.3. Today, t. Bili 6.7, albumin 2.7, ALT 69, , and alk phos 147. BUN 18/creatinine 0.66, sodium 126. MELD 25.       EGD 4/29/21 with findings of a single grade I esophageal varix, PHG, gastritis. Surveillance EGD is due 2022. Colonoscopy 21 with IH and EH. Due . She is scheduled to see in [place] follow up around 21. She had her initial visit with [facility] 21. PMH:  Past Medical History:   Diagnosis Date    Depression     medications     GERD (gastroesophageal reflux disease)     medications    Liver disease 2021    patient states that \"something is wrong with my liver\" but states has not been diagnosed.  Low iron        PSH:  Past Surgical History:   Procedure Laterality Date    HX APPENDECTOMY      HX  SECTION      HX DILATION AND CURETTAGE      HX TONSILLECTOMY      IR PARACENTESIS ABD W IMAGE  2021    IR PARACENTESIS ABD W IMAGE  2021    IR PARACENTESIS ABD W IMAGE  2021       Allergies: Allergies   Allergen Reactions    Sulfa (Sulfonamide Antibiotics) Rash       Home Medications:  Prior to Admission medications    Medication Sig Start Date End Date Taking? Authorizing Provider   furosemide (LASIX) 20 mg tablet Take 20 mg by mouth daily. Other, MD John   spironolactone (ALDACTONE) 50 mg tablet Take 50 mg by mouth daily. John Dinh MD   ondansetron (ZOFRAN ODT) 4 mg disintegrating tablet Take 1 Tablet by mouth every eight (8) hours as needed for Nausea. Patient not taking: Reported on 2021   Kian Harper R, DO   lidocaine (LIDODERM) 5 % Apply patch to the affected area for 12 hours a day and remove for 12 hours a day. 21   Gabriela Velez PA   cetirizine (ZYRTEC) 10 mg tablet Take 10 mg by mouth daily. 20   Provider, Historical   FLUoxetine (PROzac) 40 mg capsule Take 40 mg by mouth daily. 20   Provider, Historical   magnesium oxide (MAG-OX) 400 mg tablet Take  by mouth daily. Patient not taking: Reported on 2021    Provider, Historical   therapeutic multivitamin [facility]) tablet Take 1 Tab by mouth daily.   Patient not taking: Reported on 7/23/2021    Provider, Historical   valACYclovir (VALTREX) 1 gram tablet Take 1,000 mg by mouth daily. 7/29/20   Provider, Historical   pantoprazole (PROTONIX) 40 mg tablet Take 40 mg by mouth daily. Provider, Historical       Hospital Medications:  Current Facility-Administered Medications   Medication Dose Route Frequency    cetirizine (ZYRTEC) tablet 10 mg  10 mg Oral DAILY    FLUoxetine (PROzac) capsule 40 mg  40 mg Oral DAILY    valACYclovir (VALTREX) tablet 1,000 mg  1,000 mg Oral DAILY    pantoprazole (PROTONIX) tablet 40 mg  40 mg Oral ACB    furosemide (LASIX) tablet 20 mg  20 mg Oral DAILY    spironolactone (ALDACTONE) tablet 50 mg  50 mg Oral DAILY    sodium chloride (NS) flush 5-40 mL  5-40 mL IntraVENous Q8H    sodium chloride (NS) flush 5-40 mL  5-40 mL IntraVENous PRN    polyethylene glycol (MIRALAX) packet 17 g  17 g Oral DAILY PRN    ondansetron (ZOFRAN ODT) tablet 4 mg  4 mg Oral Q8H PRN    Or    ondansetron (ZOFRAN) injection 4 mg  4 mg IntraVENous Q6H PRN    morphine injection 2 mg  2 mg IntraVENous Q4H PRN    HYDROcodone-homatropine (HYCODAN) 5-1.5 mg/5 mL (5 mL) oral solution 5 mL  5 mL Oral Q4H PRN       Social History:  Social History     Tobacco Use    Smoking status: Never Smoker    Smokeless tobacco: Never Used   Substance Use Topics    Alcohol use: Not Currently       Pt denies any history of drug use, blood transfusions, or tattoos. Family History:  History reviewed. No pertinent family history. Review of Systems:  A detailed 10 system ROS is obtained, with pertinent positives as listed above. All others are negative. Diet:  regular    Objective:     Physical Exam:  Vitals:  Visit Vitals  /63 (BP 1 Location: Left upper arm, BP Patient Position: At rest)   Pulse 95   Temp 98.8 °F (37.1 °C)   Resp 18   SpO2 93%     Gen:  Pt is alert, cooperative, no acute distress  Skin:  Face reveal no rashes. +Chest wall telangiectasias.    HEENT: Sclerae anicteric. Cardiovascular: Regular rate and rhythm. Respiratory:  Comfortable breathing with no accessory muscle use. Clear breath sounds anteriorly with no wheezes, rales, or rhonchi. GI:  Abdomen nondistended, soft, +Mild diffuse ttp (improved per pt). Normal active bowel sounds. Rectal:  Deferred  Musculoskeletal:  No pitting edema of the lower legs. Neurological:  Gross memory appears intact. Patient is alert and oriented. Psychiatric:  Mood appears appropriate with judgement intact. Laboratory:    Recent Labs     08/30/21  0455 08/29/21  0441 08/28/21  2105   WBC 15.2* 15.2* 14.7*   HGB 10.6* 10.9* 12.1   HCT 29.4* 31.3* 34.0*    206 225   MCV 90.5 92.1 89.2   * 128* 127*   K 4.6 4.4 4.6   CL 97* 99 95*   CO2 22 24 22   BUN 18 19 18   CREA 0.66 0.72 0.98   CA 8.5 7.6* 9.3   * 109* 111*   * 161* 194*   * 114* 147*   ALT 69* 62 71*   TBILI 6.7* 5.1* 5.2*   ALB 2.7* 2.8* 3.1*   TP 5.8* 6.0* 7.0   LPSE  --   --  578*   PTP  --   --  17.0*   INR  --   --  1.3          Assessment:     Principal Problem:    Elevated LFTs (8/29/2021)    Active Problems:    Cirrhosis of liver with ascites (HCC) (7/24/2021)      Abdominal pain (8/29/2021)      Hyponatremia (8/29/2021)      Elevated lactic acid level (8/29/2021)      Patient is a 40 y.o. female patient of Dr. Justin Veliz (and also sees Yari Serrano) with PMH including but not limited to GERD, Cryptogenic Cirrhosis, Tramaine Larsen is admitted and seen by GI for abdominal pain and ascites. She has had 4 LVPs since July 2, and has been unable to tolerate higher doses of diuretics secondary to hypotension. Midodrine caused headaches. MELD 8/28/21 was 24. CTap 6/2021 neg for focal liver lesion. She is s/p recent EGD and Colonoscopy as outlined in HPI above. Plan:   1) await results of fluid studies  2) continue lasix 20 and aldactone 50 mg daily.  Patient has been unable to tolerate higher doses of diuretics in the past secondary to hypotension. Midodrine caused headaches. Consider adding long acting sandostatin to allow higher dose of diuretic to be given  3) 2 gram sodium diet  4) Follow up labs- CBC, CMP, INR tomorrow AM.  5) MiraLax 17g b.i.d for improved bowel regularity. 6) PPI daily  7) Keep [place] visit as scheduled for ongoing transplant evaluation. 8) Of note- she was formally discharged from our practice 8/13 due to breakdown of physician-patient relationship. She is asking if she can be re-established with our group but with a different physician within our practice. Patient is seen and examined in collaboration with Dr. Guille Jo. Assessment and plan as per Dr. Khari Botello.   Cherrie Wilkinson PA-C

## 2021-08-30 NOTE — PROGRESS NOTES
TRANSFER - IN REPORT:    Verbal report received from Cheikh Montez Rn (name) on Patsey Age  being received from IR (unit) for ordered procedure      Report consisted of patients Situation, Background, Assessment and   Recommendations(SBAR). Information from the following report(s) SBAR and Procedure Summary was reviewed with the receiving nurse. Opportunity for questions and clarification was provided. Assessment completed upon patients arrival to unit and care assumed.

## 2021-08-31 ENCOUNTER — APPOINTMENT (OUTPATIENT)
Dept: GENERAL RADIOLOGY | Age: 41
DRG: 432 | End: 2021-08-31
Attending: INTERNAL MEDICINE
Payer: COMMERCIAL

## 2021-08-31 LAB
ALBUMIN SERPL-MCNC: 2.5 G/DL (ref 3.5–5)
ALBUMIN/GLOB SERPL: 0.8 {RATIO} (ref 1.2–3.5)
ALP SERPL-CCNC: 146 U/L (ref 50–130)
ALT SERPL-CCNC: 71 U/L (ref 12–65)
ANION GAP SERPL CALC-SCNC: 7 MMOL/L (ref 7–16)
AST SERPL-CCNC: 135 U/L (ref 15–37)
B PERT DNA SPEC QL NAA+PROBE: NOT DETECTED
BASOPHILS # BLD: 0 K/UL (ref 0–0.2)
BASOPHILS NFR BLD: 0 % (ref 0–2)
BILIRUB SERPL-MCNC: 7.7 MG/DL (ref 0.2–1.1)
BORDETELLA PARAPERTUSSIS PCR, BORPAR: NOT DETECTED
BUN SERPL-MCNC: 15 MG/DL (ref 6–23)
C PNEUM DNA SPEC QL NAA+PROBE: NOT DETECTED
CALCIUM SERPL-MCNC: 8.4 MG/DL (ref 8.3–10.4)
CHLORIDE SERPL-SCNC: 95 MMOL/L (ref 98–107)
CO2 SERPL-SCNC: 23 MMOL/L (ref 21–32)
CREAT SERPL-MCNC: 0.61 MG/DL (ref 0.6–1)
DIFFERENTIAL METHOD BLD: ABNORMAL
EOSINOPHIL # BLD: 0.2 K/UL (ref 0–0.8)
EOSINOPHIL NFR BLD: 1 % (ref 0.5–7.8)
ERYTHROCYTE [DISTWIDTH] IN BLOOD BY AUTOMATED COUNT: 22 % (ref 11.9–14.6)
FLUAV SUBTYP SPEC NAA+PROBE: NOT DETECTED
FLUBV RNA SPEC QL NAA+PROBE: NOT DETECTED
GLOBULIN SER CALC-MCNC: 3.1 G/DL (ref 2.3–3.5)
GLUCOSE SERPL-MCNC: 109 MG/DL (ref 65–100)
HADV DNA SPEC QL NAA+PROBE: NOT DETECTED
HCOV 229E RNA SPEC QL NAA+PROBE: NOT DETECTED
HCOV HKU1 RNA SPEC QL NAA+PROBE: NOT DETECTED
HCOV NL63 RNA SPEC QL NAA+PROBE: NOT DETECTED
HCOV OC43 RNA SPEC QL NAA+PROBE: NOT DETECTED
HCT VFR BLD AUTO: 29.5 % (ref 35.8–46.3)
HGB BLD-MCNC: 10.4 G/DL (ref 11.7–15.4)
HMPV RNA SPEC QL NAA+PROBE: NOT DETECTED
HPIV1 RNA SPEC QL NAA+PROBE: NOT DETECTED
HPIV2 RNA SPEC QL NAA+PROBE: NOT DETECTED
HPIV3 RNA SPEC QL NAA+PROBE: NOT DETECTED
HPIV4 RNA SPEC QL NAA+PROBE: NOT DETECTED
IMM GRANULOCYTES # BLD AUTO: 0.1 K/UL (ref 0–0.5)
IMM GRANULOCYTES NFR BLD AUTO: 0 % (ref 0–5)
INR PPP: 1.6
LYMPHOCYTES # BLD: 1.9 K/UL (ref 0.5–4.6)
LYMPHOCYTES NFR BLD: 13 % (ref 13–44)
M PNEUMO DNA SPEC QL NAA+PROBE: NOT DETECTED
MCH RBC QN AUTO: 32.2 PG (ref 26.1–32.9)
MCHC RBC AUTO-ENTMCNC: 35.3 G/DL (ref 31.4–35)
MCV RBC AUTO: 91.3 FL (ref 79.6–97.8)
MONOCYTES # BLD: 1.4 K/UL (ref 0.1–1.3)
MONOCYTES NFR BLD: 10 % (ref 4–12)
NEUTS SEG # BLD: 10.4 K/UL (ref 1.7–8.2)
NEUTS SEG NFR BLD: 75 % (ref 43–78)
NRBC # BLD: 0 K/UL (ref 0–0.2)
OSMOLALITY SERPL: 257 MOSM/KG H2O (ref 275–295)
PLATELET # BLD AUTO: 158 K/UL (ref 150–450)
PMV BLD AUTO: 10.5 FL (ref 9.4–12.3)
POTASSIUM SERPL-SCNC: 4.9 MMOL/L (ref 3.5–5.1)
PROT SERPL-MCNC: 5.6 G/DL (ref 6.3–8.2)
PROTHROMBIN TIME: 19.6 SEC (ref 12.6–14.5)
RBC # BLD AUTO: 3.23 M/UL (ref 4.05–5.2)
RSV RNA SPEC QL NAA+PROBE: NOT DETECTED
RV+EV RNA SPEC QL NAA+PROBE: NOT DETECTED
SARS-COV-2 PCR, COVPCR: NOT DETECTED
SODIUM SERPL-SCNC: 125 MMOL/L (ref 136–145)
WBC # BLD AUTO: 14 K/UL (ref 4.3–11.1)

## 2021-08-31 PROCEDURE — 36415 COLL VENOUS BLD VENIPUNCTURE: CPT

## 2021-08-31 PROCEDURE — 65270000029 HC RM PRIVATE

## 2021-08-31 PROCEDURE — 74011000258 HC RX REV CODE- 258: Performed by: INTERNAL MEDICINE

## 2021-08-31 PROCEDURE — 85025 COMPLETE CBC W/AUTO DIFF WBC: CPT

## 2021-08-31 PROCEDURE — 71046 X-RAY EXAM CHEST 2 VIEWS: CPT

## 2021-08-31 PROCEDURE — 85610 PROTHROMBIN TIME: CPT

## 2021-08-31 PROCEDURE — 80053 COMPREHEN METABOLIC PANEL: CPT

## 2021-08-31 PROCEDURE — 74011250636 HC RX REV CODE- 250/636: Performed by: INTERNAL MEDICINE

## 2021-08-31 PROCEDURE — 83930 ASSAY OF BLOOD OSMOLALITY: CPT

## 2021-08-31 PROCEDURE — 74011250637 HC RX REV CODE- 250/637: Performed by: FAMILY MEDICINE

## 2021-08-31 PROCEDURE — 0202U NFCT DS 22 TRGT SARS-COV-2: CPT

## 2021-08-31 RX ADMIN — Medication 10 ML: at 21:44

## 2021-08-31 RX ADMIN — FUROSEMIDE 20 MG: 20 TABLET ORAL at 08:48

## 2021-08-31 RX ADMIN — HYDROCODONE BITARTRATE AND HOMATROPINE METHYLBROMIDE 5 ML: 5; 1.5 SOLUTION ORAL at 16:20

## 2021-08-31 RX ADMIN — Medication 10 ML: at 05:50

## 2021-08-31 RX ADMIN — FLUOXETINE 40 MG: 20 CAPSULE ORAL at 08:47

## 2021-08-31 RX ADMIN — PHYTONADIONE 10 MG: 10 INJECTION, EMULSION INTRAMUSCULAR; INTRAVENOUS; SUBCUTANEOUS at 19:31

## 2021-08-31 RX ADMIN — PANTOPRAZOLE SODIUM 40 MG: 40 TABLET, DELAYED RELEASE ORAL at 06:06

## 2021-08-31 RX ADMIN — SPIRONOLACTONE 50 MG: 25 TABLET ORAL at 08:48

## 2021-08-31 RX ADMIN — HYDROCODONE BITARTRATE AND HOMATROPINE METHYLBROMIDE 5 ML: 5; 1.5 SOLUTION ORAL at 06:06

## 2021-08-31 RX ADMIN — VALACYCLOVIR HYDROCHLORIDE 1000 MG: 500 TABLET, FILM COATED ORAL at 08:48

## 2021-08-31 RX ADMIN — CETIRIZINE HYDROCHLORIDE 10 MG: 10 TABLET, FILM COATED ORAL at 08:48

## 2021-08-31 NOTE — PROGRESS NOTES
Problem: Falls - Risk of  Goal: *Absence of Falls  Description: Document Nat Redd Fall Risk and appropriate interventions in the flowsheet.   Outcome: Progressing Towards Goal  Note: Fall Risk Interventions:            Medication Interventions: Teach patient to arise slowly                   Problem: Patient Education: Go to Patient Education Activity  Goal: Patient/Family Education  Outcome: Progressing Towards Goal

## 2021-08-31 NOTE — PROGRESS NOTES
Talked to the nurse to bring PT down when they are ready. Asked to please call first to let me know they are on the way.

## 2021-08-31 NOTE — PROGRESS NOTES
Hospitalist Progress Note     Admit Date:  2021  8:57 PM   Name:  Sarahi Goodson   Age:  36 y.o.  :  1980   MRN:  [id number]     Presenting Complaint: Abdominal Pain and Back Pain    Initial Admission Diagnosis: Abdominal pain [R10.9]     Assessment and Plan:   # Abdominal pain in setting of cryptogenic cirrhosis and ascites              - S/p paracentesis  with 4L removed, fluid analysis not c/w SBP so not on abx. WBCs a little better. Afebrile. Appreciate GI recommendations. TIPS eval per IR. Has appt with [place] for transplant eval. Con't diuretics, unable to tolerate dosing due to hyponatremia and BPs. D/w Dr. Ash [facility] today. # Elevated bilirubin/transaminitis              - 2/2 cirrhosis.     # Elevated LA              - Improved, prone to lactic acidosis with liver disease.     # HypoNa              - Likely from cirrhosis/diuretics. Monitor. Discharge Planning: Pending. Diet:  ADULT DIET Regular; Low Sodium (2 gm)  DVT PPx: SCDs only  Code status: Full Code    Active Hospital Problems    Diagnosis Date Noted    Abdominal pain 2021    Hyponatremia 2021    Elevated LFTs 2021    Elevated lactic acid level 2021    Cirrhosis of liver with ascites ([facility] [address] 2021       Hospital Course:   Ms. Azar Riggins is a very nice 37 y/o WF recently diagnosed with cryptogenic cirrhosis, s/p para x4 since 2021, who was admitted on  with progressive abdominal pain and concern for SBP. Afebrile, WBCs 14k, CT a/p w/o acute process. Abx held given stability. Para on  with 4L removed, not c/w SBP. GI following. 24hr Events/Subjective (21):   : Abdo a little more distended today. Feels ok. No chest pain or SOB. Pt and family worried with MELD-Na score 27, considering going to [facility] ER. I have d/w GI PA and attending who are helping.      Objective:     Patient Vitals for the past 24 hrs:   Temp Pulse Resp BP SpO2   21 1215 98.9 °F (37.2 °C) 92 14 (!) 100/50 97 %   08/31/21 0817 98.9 °F (37.2 °C) 90 13 (!) 98/55 94 %   08/31/21 0329 98.9 °F (37.2 °C) 99 16 104/62 95 %   08/30/21 2318 99.3 °F (37.4 °C) 100 16 (!) 98/59 96 %   08/30/21 1918 99.2 °F (37.3 °C) (!) 101 16 (!) 95/54 99 %   08/30/21 1622 98.8 °F (37.1 °C) 100 18 (!) 96/57 98 %     Oxygen Therapy  O2 Sat (%): 97 % (08/31/21 1215)  Pulse via Oximetry: 94 beats per minute (08/28/21 2243)  O2 Device: None (08/29/21 0031)    Estimated body mass index is 21.35 kg/m² as calculated from the following:    Height as of 8/23/21: 5' 1\" (1.549 m). Weight as of 8/23/21: 51.3 kg (113 lb). No intake or output data in the 24 hours ending 08/31/21 1336      General:    Well nourished, thin. No overt distress  Head:  Normocephalic, atraumatic  Eyes:  Scleral jaundice. Pupils equally round. HENT:  Nares appear normal, no drainage. Moist mucous membranes. Neck:  No restricted ROM. Trachea midline  CV:   RRR. No m/r/g. No JVD  Lungs:   CTAB. No wheezing, rhonchi, or rales. Appears even, unlabored  Abdomen: Bowel sounds present. Soft, nontender, mild distension. Extremities: Warm and dry. No cyanosis or clubbing. No edema. Skin:     No rashes. Mild jaundice. Normal turgor. Normal coloration. Neuro:  Cranial nerves II-XII grossly intact. Sensation intact  Psych:  Normal mood and affect.   Alert and oriented x3    Data Ordered and Personally Reviewed:    Last 24hr Labs:  Recent Results (from the past 24 hour(s))   METABOLIC PANEL, COMPREHENSIVE    Collection Time: 08/31/21  5:22 AM   Result Value Ref Range    Sodium 125 (L) 136 - 145 mmol/L    Potassium 4.9 3.5 - 5.1 mmol/L    Chloride 95 (L) 98 - 107 mmol/L    CO2 23 21 - 32 mmol/L    Anion gap 7 7 - 16 mmol/L    Glucose 109 (H) 65 - 100 mg/dL    BUN 15 6 - 23 MG/DL    Creatinine 0.61 0.6 - 1.0 MG/DL    GFR est AA >60 >60 ml/min/1.73m2    GFR est non-AA >60 >60 ml/min/1.73m2    Calcium 8.4 8.3 - 10.4 MG/DL    Bilirubin, total 7.7 (H) 0.2 - 1.1 MG/DL    ALT (SGPT) 71 (H) 12 - 65 U/L    AST (SGOT) 135 (H) 15 - 37 U/L    Alk. phosphatase 146 (H) 50 - 130 U/L    Protein, total 5.6 (L) 6.3 - 8.2 g/dL    Albumin 2.5 (L) 3.5 - 5.0 g/dL    Globulin 3.1 2.3 - 3.5 g/dL    A-G Ratio 0.8 (L) 1.2 - 3.5     CBC WITH AUTOMATED DIFF    Collection Time: 08/31/21  5:22 AM   Result Value Ref Range    WBC 14.0 (H) 4.3 - 11.1 K/uL    RBC 3.23 (L) 4.05 - 5.2 M/uL    HGB 10.4 (L) 11.7 - 15.4 g/dL    HCT 29.5 (L) 35.8 - 46.3 %    MCV 91.3 79.6 - 97.8 FL    MCH 32.2 26.1 - 32.9 PG    MCHC 35.3 (H) 31.4 - 35.0 g/dL    RDW 22.0 (H) 11.9 - 14.6 %    PLATELET 961 308 - 864 K/uL    MPV 10.5 9.4 - 12.3 FL    ABSOLUTE NRBC 0.00 0.0 - 0.2 K/uL    DF AUTOMATED      NEUTROPHILS 75 43 - 78 %    LYMPHOCYTES 13 13 - 44 %    MONOCYTES 10 4.0 - 12.0 %    EOSINOPHILS 1 0.5 - 7.8 %    BASOPHILS 0 0.0 - 2.0 %    IMMATURE GRANULOCYTES 0 0.0 - 5.0 %    ABS. NEUTROPHILS 10.4 (H) 1.7 - 8.2 K/UL    ABS. LYMPHOCYTES 1.9 0.5 - 4.6 K/UL    ABS. MONOCYTES 1.4 (H) 0.1 - 1.3 K/UL    ABS. EOSINOPHILS 0.2 0.0 - 0.8 K/UL    ABS. BASOPHILS 0.0 0.0 - 0.2 K/UL    ABS. IMM. GRANS. 0.1 0.0 - 0.5 K/UL   PROTHROMBIN TIME + INR    Collection Time: 08/31/21  5:22 AM   Result Value Ref Range    Prothrombin time 19.6 (H) 12.6 - 14.5 sec    INR 1.6         All Micro Results     Procedure Component Value Units Date/Time    CULTURE, BODY FLUID Mary Sears STAIN [[id number]]     Order Status: Sent Specimen:  Body Fluid from Ascitic Fluid           Current Meds:  Current Facility-Administered Medications   Medication Dose Route Frequency    polyethylene glycol (MIRALAX) packet 17 g  17 g Oral BID    cetirizine (ZYRTEC) tablet 10 mg  10 mg Oral DAILY    FLUoxetine (PROzac) capsule 40 mg  40 mg Oral DAILY    valACYclovir (VALTREX) tablet 1,000 mg  1,000 mg Oral DAILY    pantoprazole (PROTONIX) tablet 40 mg  40 mg Oral ACB    furosemide (LASIX) tablet 20 mg  20 mg Oral DAILY    spironolactone (ALDACTONE) tablet 50 mg  50 mg Oral DAILY    sodium chloride (NS) flush 5-40 mL  5-40 mL IntraVENous Q8H    sodium chloride (NS) flush 5-40 mL  5-40 mL IntraVENous PRN    ondansetron (ZOFRAN ODT) tablet 4 mg  4 mg Oral Q8H PRN    Or    ondansetron (ZOFRAN) injection 4 mg  4 mg IntraVENous Q6H PRN    morphine injection 2 mg  2 mg IntraVENous Q4H PRN    HYDROcodone-homatropine (HYCODAN) 5-1.5 mg/5 mL (5 mL) oral solution 5 mL  5 mL Oral Q4H PRN       Other Studies:    No results found. Signed:  Brian Cowart MD    Part of this note may have been written by using a voice dictation software. The note has been proof read but may still contain some grammatical/other typographical errors.

## 2021-08-31 NOTE — PROGRESS NOTES
[facility] Progress Note         Admit Date:  8/28/2021    Today's Date:  8/31/2021    CC: Abdominal pain, Ascites    Subjective:     Patient s/p paracentesis 8/30/21 removing 4110 ml of fluid. No evidence of infected fluid. Patient concerned that ascites is already recurring. No abdominal pain. Having nausea and had an episode of vomiting today. No hematemesis. Has upcoming appointment at [facility] 9/14-9/15. Patient's mother is extremely concerned that the patient \"goes downhill fast\" and wants patient to be seen at [facility] mentioning leaving AMA and going to [facility] ER. Medications:   Current Facility-Administered Medications   Medication Dose Route Frequency    polyethylene glycol (MIRALAX) packet 17 g  17 g Oral BID    cetirizine (ZYRTEC) tablet 10 mg  10 mg Oral DAILY    FLUoxetine (PROzac) capsule 40 mg  40 mg Oral DAILY    valACYclovir (VALTREX) tablet 1,000 mg  1,000 mg Oral DAILY    pantoprazole (PROTONIX) tablet 40 mg  40 mg Oral ACB    furosemide (LASIX) tablet 20 mg  20 mg Oral DAILY    spironolactone (ALDACTONE) tablet 50 mg  50 mg Oral DAILY    sodium chloride (NS) flush 5-40 mL  5-40 mL IntraVENous Q8H    sodium chloride (NS) flush 5-40 mL  5-40 mL IntraVENous PRN    ondansetron (ZOFRAN ODT) tablet 4 mg  4 mg Oral Q8H PRN    Or    ondansetron (ZOFRAN) injection 4 mg  4 mg IntraVENous Q6H PRN    morphine injection 2 mg  2 mg IntraVENous Q4H PRN    HYDROcodone-homatropine (HYCODAN) 5-1.5 mg/5 mL (5 mL) oral solution 5 mL  5 mL Oral Q4H PRN       Review of Systems:  No chest pain or SOB. Diet:  2 gram sodium diet    Objective:   Vitals:  Visit Vitals  BP (!) 98/55 (BP 1 Location: Left upper arm, BP Patient Position: At rest;Supine)   Pulse 90   Temp 98.9 °F (37.2 °C)   Resp 13   SpO2 94%     Intake/Output:  No intake/output data recorded. No intake/output data recorded.   Exam:  General appearance: alert, cooperative, no distress; jaundiced  Lungs: clear to auscultation bilaterally anteriorly  Heart: regular rate and rhythm  Abdomen: soft, non-tender. Bowel sounds normal. No masses, no organomegaly  Extremities: extremities normal, atraumatic, no cyanosis or edema  Neuro:  alert and oriented    Data Review (Labs):    Recent Labs     08/31/21  0522 08/30/21  0455 08/29/21  0441 08/28/21  2105   WBC 14.0* 15.2* 15.2* 14.7*   HGB 10.4* 10.6* 10.9* 12.1   HCT 29.5* 29.4* 31.3* 34.0*    201 206 225   MCV 91.3 90.5 92.1 89.2   * 126* 128* 127*   K 4.9 4.6 4.4 4.6   CL 95* 97* 99 95*   CO2 23 22 24 22   BUN 15 18 19 18   CREA 0.61 0.66 0.72 0.98   CA 8.4 8.5 7.6* 9.3   * 108* 109* 111*   * 147* 161* 194*   * 129* 114* 147*   ALT 71* 69* 62 71*   TBILI 7.7* 6.7* 5.1* 5.2*   ALB 2.5* 2.7* 2.8* 3.1*   TP 5.6* 5.8* 6.0* 7.0   LPSE  --   --   --  578*   PTP 19.6*  --   --  17.0*   INR 1.6  --   --  1.3     IR guided paracentesis 8/30/21 drainage of 4110 mL of thin yellow fluid. Ascitic Fluid studies    Ref. Range 7/27/2021 09:40 8/30/2021 10:32   BODY FLUID TYPE Latest Units:   ASCITIC FLUID ASCITIC FLUID   FLUID APPEARANCE Latest Units:   CLEAR CLEAR   FLUID COLOR Latest Units:   YELLOW YELLOW   FLUID RBC CT. Latest Units: /cu mm <1,000 <1,000   FLUID WBC COUNT Latest Units: /cu mm 87 <100   Fluid Type: Latest Units:    ASCITIC FLUID   Albumin, body fld. Latest Units: g/dL  0.3     Ascitic fluid culture 8/30/21 - In process  Assessment:     Principal Problem:    Elevated LFTs (8/29/2021)    Active Problems:    Cirrhosis of liver with ascites ([address] (7/24/2021)      Abdominal pain (8/29/2021)      Hyponatremia (8/29/2021)      Elevated lactic acid level (8/29/2021)       40 y.o. female patient of Dr. Dayan Zuniga (previously seen by Ncik Arcos, now being seen at [facility] including but not limited to GERD, Cryptogenic Cirrhosis, Diane Buff is admitted and seen by GI for abdominal pain and ascites.  She has had 4 LVPs since July 2, and has been unable to tolerate higher doses of diuretics secondary to hypotension. Midodrine caused headaches. Repeat paracentesis 8/30 removed 4110mL ascitic fluid. SAAG >1.1 c/w portal HTN. Cx results in process. MELD ([place]) 8/31/21 was 19. CT ap 6/2021 neg for focal liver lesion. She is s/p recent EGD and Colonoscopy       Plan:   1) await results of fluid culture  2) continue lasix 20 and aldactone 50 mg daily. 3) Follow trend of LFTs, INR, renal function, Na  5) MiraLax 17g b.i.d for improved bowel regularity. 6) PPI daily  7) Patient has appointment at [facility] on 9/14 but asking to go there now. Asking if we could talk to the transplant team at [facility]. Will discuss with Dr. Renan Wall    8) Of note- she was formally discharged from our practice 8/13 due to breakdown of physician-patient relationship. She is asking if she can be re-established with our group but with a different physician within our practice.       Patient is seen and examined in collaboration with Dr. Natasha Weathers. Assessment and plan as per Dr. Alyce Crump.   LOREN Pedro

## 2021-09-01 LAB
ALBUMIN SERPL-MCNC: 2.3 G/DL (ref 3.5–5)
ALBUMIN/GLOB SERPL: 0.8 {RATIO} (ref 1.2–3.5)
ALP SERPL-CCNC: 138 U/L (ref 50–130)
ALT SERPL-CCNC: 68 U/L (ref 12–65)
ANION GAP SERPL CALC-SCNC: 8 MMOL/L (ref 7–16)
AST SERPL-CCNC: 127 U/L (ref 15–37)
BACTERIA SPEC CULT: NORMAL
BILIRUB SERPL-MCNC: 7.8 MG/DL (ref 0.2–1.1)
BUN SERPL-MCNC: 17 MG/DL (ref 6–23)
CALCIUM SERPL-MCNC: 8.2 MG/DL (ref 8.3–10.4)
CHLORIDE SERPL-SCNC: 92 MMOL/L (ref 98–107)
CO2 SERPL-SCNC: 21 MMOL/L (ref 21–32)
CREAT SERPL-MCNC: 0.63 MG/DL (ref 0.6–1)
CREAT UR-MCNC: 90 MG/DL
GLOBULIN SER CALC-MCNC: 2.9 G/DL (ref 2.3–3.5)
GLUCOSE SERPL-MCNC: 107 MG/DL (ref 65–100)
GRAM STN SPEC: NORMAL
GRAM STN SPEC: NORMAL
INR PPP: 1.5
POTASSIUM SERPL-SCNC: 5.1 MMOL/L (ref 3.5–5.1)
PROT SERPL-MCNC: 5.2 G/DL (ref 6.3–8.2)
PROTHROMBIN TIME: 18.7 SEC (ref 12.6–14.5)
SERVICE CMNT-IMP: NORMAL
SODIUM SERPL-SCNC: 121 MMOL/L (ref 136–145)
SODIUM UR-SCNC: <5 MMOL/L

## 2021-09-01 PROCEDURE — 74011250637 HC RX REV CODE- 250/637: Performed by: INTERNAL MEDICINE

## 2021-09-01 PROCEDURE — 85610 PROTHROMBIN TIME: CPT

## 2021-09-01 PROCEDURE — 74011250637 HC RX REV CODE- 250/637: Performed by: PHYSICIAN ASSISTANT

## 2021-09-01 PROCEDURE — 36415 COLL VENOUS BLD VENIPUNCTURE: CPT

## 2021-09-01 PROCEDURE — 65270000029 HC RM PRIVATE

## 2021-09-01 PROCEDURE — 84300 ASSAY OF URINE SODIUM: CPT

## 2021-09-01 PROCEDURE — 80053 COMPREHEN METABOLIC PANEL: CPT

## 2021-09-01 PROCEDURE — 82570 ASSAY OF URINE CREATININE: CPT

## 2021-09-01 PROCEDURE — 74011000258 HC RX REV CODE- 258: Performed by: INTERNAL MEDICINE

## 2021-09-01 PROCEDURE — 74011250637 HC RX REV CODE- 250/637: Performed by: FAMILY MEDICINE

## 2021-09-01 PROCEDURE — 74011250636 HC RX REV CODE- 250/636: Performed by: INTERNAL MEDICINE

## 2021-09-01 RX ADMIN — Medication 10 ML: at 13:41

## 2021-09-01 RX ADMIN — POLYETHYLENE GLYCOL 3350 17 G: 17 POWDER, FOR SOLUTION ORAL at 08:47

## 2021-09-01 RX ADMIN — PHYTONADIONE 10 MG: 10 INJECTION, EMULSION INTRAMUSCULAR; INTRAVENOUS; SUBCUTANEOUS at 09:59

## 2021-09-01 RX ADMIN — SPIRONOLACTONE 50 MG: 25 TABLET ORAL at 08:47

## 2021-09-01 RX ADMIN — Medication 1 PACKET: at 21:36

## 2021-09-01 RX ADMIN — Medication 1 PACKET: at 14:00

## 2021-09-01 RX ADMIN — POLYETHYLENE GLYCOL 3350 17 G: 17 POWDER, FOR SOLUTION ORAL at 17:17

## 2021-09-01 RX ADMIN — FUROSEMIDE 20 MG: 20 TABLET ORAL at 08:47

## 2021-09-01 RX ADMIN — VALACYCLOVIR HYDROCHLORIDE 1000 MG: 500 TABLET, FILM COATED ORAL at 08:47

## 2021-09-01 RX ADMIN — Medication 10 ML: at 06:09

## 2021-09-01 RX ADMIN — HYDROCODONE BITARTRATE AND HOMATROPINE METHYLBROMIDE 5 ML: 5; 1.5 SOLUTION ORAL at 20:20

## 2021-09-01 RX ADMIN — PANTOPRAZOLE SODIUM 40 MG: 40 TABLET, DELAYED RELEASE ORAL at 06:08

## 2021-09-01 RX ADMIN — FLUOXETINE 40 MG: 20 CAPSULE ORAL at 08:47

## 2021-09-01 RX ADMIN — Medication 10 ML: at 21:42

## 2021-09-01 RX ADMIN — CETIRIZINE HYDROCHLORIDE 10 MG: 10 TABLET, FILM COATED ORAL at 08:47

## 2021-09-01 NOTE — CONSULTS
Comprehensive Nutrition Assessment    Type and Reason for Visit: Initial, Consult  Best Practice Alert for Malnutrition Screening Tool: Recently Lost Weight Without Trying: Yes,  , Eating Poorly Due to Decreased Appetite: Yes  Unintentional weight loss (GI): Progressive malnutrition with cirrhosis, and 2g na diet    Nutrition Recommendations/Plan:    Mother to continue to supply CIB during admission-encouraged pt to sip on slowly throughout the day   Add Magic cup bid   Add multivitamin-stresstab with zinc     Malnutrition Assessment:  Malnutrition Status: Severe malnutrition  Context: Chronic illness (cirrhosis)  Findings of clinical characteristics of malnutrition:   Energy Intake:  7 - 75% or less est energy requirements for 1 month or longer (<50% of estimated need for at least 2 months)  Weight Loss:  7.0 - Greater than 7.5% over 3 months (120#(April 2021) to 103#= 14.2%)     Body Fat Loss:   (deferred),  (deferred)   Muscle Mass Loss:  7 - Severe muscle mass loss, Scapula (trapezius)  Fluid Accumulation:  7 - Severe, Ascites   Strength:  Not performed       Nutrition Assessment:   Nutrition History: At baseline was eating 3 meals and 3 snacks per day. Started to have abdominal pain in February but pain controlled with medication and po intake did not decline until after diagnosis of cirrhosis in April. Dramatic decline to <25% of usual intake ( 2 small meals per day) since July. Majority of weight loss has occurred since July. Ascites requiring repeat paracentesis has masked weight loss. Lowest \"dry\" weight was 103# but mother suspects weight is now < 100#. Main barrier to po intake has been nausea and vomting after meal a couple times a week since July. This has resulted in a fear  Eating. She has also has severe coughing which also can trigger vomiting. Appetite has been poor. Pt has been following a low sodium diet since July and voices no questions.  All meals are prepared at home with no salt or processed foods. She reads labels for sodium content. She has tried Ensure but does not like the taste. Typically loves sweets but now is sensitive to sweets. She tried Ensure clear during July admission and daljit found it too sweet. Magic cup was acceptable. She has also been able to tolerate bone broth at home as well as smoothies. Other barrier to po intake is that pt sleeps for large parts of the day. Nutrition Background: H.O: GERD, Cryptogenic Cirrhosis (dx 3/2021), EGD 4/29/21 with findings of a single grade I esophageal varix, PHG, gastritis, recurrent ascites requiring multiple paracentesis. Paracentesis on 7/2/2021 with 1.8L removed,7/27/21 with 3370 ml removed, 8/23 removing 4.3 L.   Presented with abdominal pain. Pt with leukocytosis, lactic acidosis, hyponatremia  Daily Update:  4110 ml Paracentesis 8/30. IR recommended TIPS evaluation  Pt has started process of transplant evaluation at [facility] 8/2 and transfer to [facility] is currently under consideration  Pt seen in company of mother. Pt eating lunch at time of visit and had consumed ~30% of meat. Mother reports pt had vomiting after eating yesterday so pt is taking it slower today. Mother purchased and brought CIB to the hospital. This is the first time she has tried it and she likes it much better than Ensure. Mother willing to keep pt supplied with this while in the hospital.    Nutrition Related Findings:      NFPE deferred. Mother shows RD picture on phone from 8/22 of back c/w severe wasting    Current Nutrition Therapies:  ADULT DIET Regular;  Low Sodium (2 gm)    Current Intake:   Average Meal Intake: 26-50% Average Supplement Intake: None ordered      Anthropometric Measures:  Height: 5' 1\" (154.9 cm)  Current Body Wt:  (None),    BMI:  ,  (Ascites skews BMI)  Admission Body Weight:  (none)  Ideal Body Weight (lbs) (Calculated): 105 lbs (48 kg),    Usual Body Wt: 55.3 kg (120 lb) (Stated by pt),         Edema: No data recorded   Estimated Daily Nutrient Needs:  Energy (kcal/day): 3910-4568 ( (35-40), Weight Used: Other (specify) (stated low weight dry weight of 103#/46.8kg))  Protein (g/day): 58-72 (1.2-1.5 grams/kg)-as liver functions allows Weight Used: (Ideal (47.7 kg))  Fluid (ml/day):   (1 ml/kcal)    Nutrition Diagnosis:   · Severe malnutrition, In context of chronic illness related to altered GI function, inadequate protein-energy intake as evidenced by  (per malnutrtion assessment above)    · Inadequate oral intake related to altered GI function as evidenced by  (current intake <25% of estimated needs)    Nutrition Interventions:   Food and/or Nutrient Delivery: Continue current diet, Start oral nutrition supplement  Nutrition Education/Counseling: Education initiated. Instructed on high kcal, high protein diet. Reviewed kcal and protein goal and encouraged intake of nutrients from liquids over solids d/t delayed gastric emptying with solids. Encouraged small frequent meals ( every 2 to 3 hr). . Avoid fasting for longer than 3 to 6 hours during the day. Use of a late evening snack of at least 50 grams of carbohydrate to help to decrease protein or muscle loss. Instructed to consume at least 3 high calorie high protein drinks/smoothies per day with 4 to 6 servings per day being ideal. Include carnation instant breakfast or bone broth powder in drinks. Use of whole milk and whole milk Greek yogurt d/t higher calorie and protein content. Add calories with fat. Start with small lamount fo fat (1 tsp) and increase as tolerated to 1 to 2 TBSP with meals/snacks. Handouts provided: High protein breakfast and lunch ideas, high protein high calorie snacks, high protein high calorie hints, build your own high calorie protein drink. Pt and mother receptive and mother will make shopping list and willing to conduct any shopping as needed. Coordination of Nutrition Care: Continue to monitor while inpatient, Interdisciplinary rounds      Goals:       Active Goal: Intake >75% of estimated needs by follow up    Nutrition Monitoring and Evaluation:      Food/Nutrient Intake Outcomes: Food and nutrient intake, Supplement intake  Physical Signs/Symptoms Outcomes: GI status    Discharge Planning:    Continue oral nutrition supplement, Continue current diet    Sharyle Edelson, RD, LD, [facility] [phone]    Disaster Mode Active

## 2021-09-01 NOTE — PROGRESS NOTES
Spoke to Elsa, nursing supervisor, according to [facility], patient would not be able to be transferred until after 3pm tomorrow. Was told to call [facility]. Informed [facility] of situation, they said that would be fine if patient left later tomorrow. Will inform night shift nurse tonight to try and set up transport for patient tomorrow morning to [facility].

## 2021-09-01 NOTE — ED PROVIDER NOTES
Advance liver failure issues. No fever or peritoneal change. For some time has nosebleeds frequently. No fever. Nausea with vomiting frequent. MELD from 10-11 to 24-25. Chronic cough is unchanged. No history encephalopathy. Present abdominal swelling is progressive. Scheduled to see [facility] for evaluation in 2 weeks    The history is provided by the patient. Abdominal Pain   This is a recurrent problem. The problem has been gradually worsening. Associated symptoms include back pain. Pertinent negatives include no fever. Back Pain   Associated symptoms include abdominal pain. Pertinent negatives include no fever. Past Medical History:   Diagnosis Date    Depression     medications     GERD (gastroesophageal reflux disease)     medications    Liver disease 2021    patient states that \"something is wrong with my liver\" but states has not been diagnosed.  Low iron        Past Surgical History:   Procedure Laterality Date    HX APPENDECTOMY      HX  SECTION      HX DILATION AND CURETTAGE      HX TONSILLECTOMY      IR PARACENTESIS ABD W IMAGE  2021    IR PARACENTESIS ABD W IMAGE  2021    IR PARACENTESIS ABD W IMAGE  2021    IR PARACENTESIS ABD W IMAGE  2021         History reviewed. No pertinent family history.     Social History     Socioeconomic History    Marital status:      Spouse name: Not on file    Number of children: Not on file    Years of education: Not on file    Highest education level: Not on file   Occupational History    Not on file   Tobacco Use    Smoking status: Never Smoker    Smokeless tobacco: Never Used   Vaping Use    Vaping Use: Never used   Substance and Sexual Activity    Alcohol use: Not Currently    Drug use: Not Currently    Sexual activity: Not on file   Other Topics Concern    Not on file   Social History Narrative    Not on file     Social Determinants of Health     Financial Resource Strain:  Difficulty of Paying Living Expenses:    Food Insecurity:     Worried About Running Out of Food in the Last Year:     Ran Out of Food in the Last Year:    Transportation Needs:     Lack of Transportation (Medical):  Lack of Transportation (Non-Medical):    Physical Activity:     Days of Exercise per Week:     Minutes of Exercise per Session:    Stress:     Feeling of Stress :    Social Connections:     Frequency of Communication with Friends and Family:     Frequency of Social Gatherings with Friends and Family:     Attends [employer] Services:     Active Member of Clubs or Organizations:     Attends Club or Organization Meetings:     Marital Status:    Intimate Partner Violence:     Fear of Current or Ex-Partner:     Emotionally Abused:     Physically Abused:     Sexually Abused: ALLERGIES: Sulfa (sulfonamide antibiotics)    Review of Systems   Constitutional: Negative for chills, diaphoresis and fever. Respiratory: Positive for cough. Negative for shortness of breath, wheezing and stridor. Cardiovascular: Positive for leg swelling. Gastrointestinal: Positive for abdominal pain. Musculoskeletal: Positive for back pain. Psychiatric/Behavioral: Negative for decreased concentration. All other systems reviewed and are negative. Vitals:    08/31/21 0817 08/31/21 1215 08/31/21 1547 08/31/21 1928   BP: (!) 98/55 (!) 100/50 (!) 99/52 103/62   Pulse: 90 92 92 94   Resp: 13 14 13 14   Temp: 98.9 °F (37.2 °C) 98.9 °F (37.2 °C) 98.6 °F (37 °C) 99.3 °F (37.4 °C)   SpO2: 94% 97% 96% 95%            Physical Exam  Vitals and nursing note reviewed. Constitutional:       General: She is not in acute distress. Appearance: She is well-developed. HENT:      Head: Atraumatic. Eyes:      General: No scleral icterus. Cardiovascular:      Rate and Rhythm: Normal rate. Pulmonary:      Effort: Pulmonary effort is normal. No respiratory distress.    Abdominal:      General: Abdomen is protuberant. Bowel sounds are normal.      Palpations: There is fluid wave. There is no pulsatile mass. Tenderness: There is no abdominal tenderness. There is no guarding or rebound. Negative signs include McBurney's sign. Musculoskeletal:      Cervical back: Neck supple. Skin:     General: Skin is warm and dry. Neurological:      Mental Status: She is disoriented. Psychiatric:         Mood and Affect: Mood normal.         Behavior: Behavior normal.         Thought Content: Thought content normal.          MDM  Number of Diagnoses or Management Options  Diagnosis management comments: Unfortunate case. Will need admit and possible tap tomorrow.  Do NOT think suitable for outpatient       Amount and/or Complexity of Data Reviewed  Clinical lab tests: ordered and reviewed  Tests in the radiology section of CPT®: reviewed and ordered  Review and summarize past medical records: yes  Independent visualization of images, tracings, or specimens: yes    Risk of Complications, Morbidity, and/or Mortality  Presenting problems: moderate  Diagnostic procedures: moderate  Management options: moderate    Patient Progress  Patient progress: stable         Procedures

## 2021-09-01 NOTE — PROGRESS NOTES
Hospitalist Progress Note     Admit Date:  2021  8:57 PM   Name:  Marian Danielle   Age:  36 y.o.  :  1980   MRN:  [id number]     Presenting Complaint: Abdominal Pain and Back Pain    Initial Admission Diagnosis: Abdominal pain [R10.9]     Assessment and Plan:   # Hyponatremia   - Progressive. Nephrology consulted. Hold diuretics today and started on UreaNa. # Abdominal pain in setting of cryptogenic cirrhosis and ascites              - S/p paracentesis  with 4L removed, fluid analysis not c/w SBP so not on abx. GI appreciated, in touch with [place] regarding possibility of transfer vs finishing her pre-xplant testing here. MELD-Na 27. # Elevated INR   - Given VitK on     # Elevated bilirubin/transaminitis              - 2/2 cirrhosis. Stable.     # Elevated LA              - Improved, prone to lactic acidosis with liver disease. Discharge Planning: Pending. Diet:  ADULT DIET Regular; Low Sodium (2 gm)  DVT PPx: SCDs  Code status: Full Code    Active Hospital Problems    Diagnosis Date Noted    Abdominal pain 2021    Hyponatremia 2021    Elevated LFTs 2021    Elevated lactic acid level 2021    Cirrhosis of liver with ascites ([facility] [address] 2021       Hospital Course:   Ms. Glynn Azul is a very nice 37 y/o WF recently diagnosed with cryptogenic cirrhosis, s/p para x4 since 2021, who was admitted on  with progressive abdominal pain and concern for SBP. Afebrile, WBCs 14k, CT a/p w/o acute process. Abx held given stability. Para on  with 4L removed, not c/w SBP. GI following, in touch with [place] regarding a possible transfer or finishing her pre-transplant testing here. Hyponatremia progressive and Nephrology consulted on , diuretics discontinued and started on UreNa. 24hr Events/Subjective (21):   : Feeling ok, mom at bedside, abdomen a little more distended. No N/V, fevers.      Objective:     Patient Vitals for the past 24 hrs:   Temp Pulse Resp BP SpO2   09/01/21 1134 97.6 °F (36.4 °C) 98 17 97/61 96 %   09/01/21 0738 98.2 °F (36.8 °C) 96 17 98/62 96 %   09/01/21 0339 98.7 °F (37.1 °C) 93 14 (!) 96/58 98 %   08/31/21 2343 99.1 °F (37.3 °C) 97 16 (!) 102/59 93 %   08/31/21 1928 99.3 °F (37.4 °C) 94 14 103/62 95 %   08/31/21 1547 98.6 °F (37 °C) 92 13 (!) 99/52 96 %     Oxygen Therapy  O2 Sat (%): 96 % (09/01/21 1134)  Pulse via Oximetry: 94 beats per minute (08/28/21 2243)  O2 Device: None (Room air) (09/01/21 0807)    Estimated body mass index is 21.35 kg/m² as calculated from the following:    Height as of this encounter: 5' 1\" (1.549 m). Weight as of 8/23/21: 51.3 kg (113 lb). No intake or output data in the 24 hours ending 09/01/21 1443      General:    Thin. No overt distress  Head:  Normocephalic, atraumatic  Eyes:  Sclerae appear jaundice. Pupils equally round. HENT:  Nares appear normal, no drainage. Moist mucous membranes  Neck:  No restricted ROM. Trachea midline  CV:   RRR. No m/r/g. No JVD  Lungs:   CTAB. No wheezing, rhonchi, or rales. Appears even, unlabored  Abdomen: Bowel sounds present. Soft, nontender, nondistended. Extremities: Warm and dry. No cyanosis or clubbing. Mild edema. Skin:     No rashes. + jaundice. Normal turgor. Normal coloration  Neuro:  Cranial nerves II-XII grossly intact. Sensation intact  Psych:  Normal mood and affect.   Alert and oriented x3    Data Ordered and Personally Reviewed:    Last 24hr Labs:  Recent Results (from the past 24 hour(s))   RESPIRATORY VIRUS PANEL W/COVID-19, PCR    Collection Time: 08/31/21  4:00 PM    Specimen: Nasopharyngeal   Result Value Ref Range    Adenovirus NOT DETECTED NOTDET      Coronavirus 229E NOT DETECTED NOTDET      Coronavirus HKU1 NOT DETECTED NOTDET      Coronavirus CVNL63 NOT DETECTED NOTDET      Coronavirus OC43 NOT DETECTED NOTDET      SARS-CoV-2, PCR NOT DETECTED NOTDET      Metapneumovirus NOT DETECTED NOTDET      Rhinovirus and Enterovirus NOT DETECTED NOTDET      Influenza A NOT DETECTED NOTDET      Influenza B NOT DETECTED NOTDET      Parainfluenza 1 NOT DETECTED NOTDET      Parainfluenza 2 NOT DETECTED NOTDET      Parainfluenza 3 NOT DETECTED NOTDET      Parainfluenza virus 4 NOT DETECTED NOTDET      RSV by PCR NOT DETECTED NOTDET      B. parapertussis, PCR NOT DETECTED NOTDET      Bordetella pertussis - PCR NOT DETECTED NOTDET      Chlamydophila pneumoniae DNA, QL, PCR NOT DETECTED NOTDET      Mycoplasma pneumoniae DNA, QL, PCR NOT DETECTED NOTDET     METABOLIC PANEL, COMPREHENSIVE    Collection Time: 09/01/21  5:35 AM   Result Value Ref Range    Sodium 121 (L) 136 - 145 mmol/L    Potassium 5.1 3.5 - 5.1 mmol/L    Chloride 92 (L) 98 - 107 mmol/L    CO2 21 21 - 32 mmol/L    Anion gap 8 7 - 16 mmol/L    Glucose 107 (H) 65 - 100 mg/dL    BUN 17 6 - 23 MG/DL    Creatinine 0.63 0.6 - 1.0 MG/DL    GFR est AA >60 >60 ml/min/1.73m2    GFR est non-AA >60 >60 ml/min/1.73m2    Calcium 8.2 (L) 8.3 - 10.4 MG/DL    Bilirubin, total 7.8 (H) 0.2 - 1.1 MG/DL    ALT (SGPT) 68 (H) 12 - 65 U/L    AST (SGOT) 127 (H) 15 - 37 U/L    Alk.  phosphatase 138 (H) 50 - 130 U/L    Protein, total 5.2 (L) 6.3 - 8.2 g/dL    Albumin 2.3 (L) 3.5 - 5.0 g/dL    Globulin 2.9 2.3 - 3.5 g/dL    A-G Ratio 0.8 (L) 1.2 - 3.5     PROTHROMBIN TIME + INR    Collection Time: 09/01/21  5:35 AM   Result Value Ref Range    Prothrombin time 18.7 (H) 12.6 - 14.5 sec    INR 1.5     SODIUM, UR, RANDOM    Collection Time: 09/01/21  1:45 PM   Result Value Ref Range    Sodium,urine random <5 MMOL/L   CREATININE, UR, RANDOM    Collection Time: 09/01/21  1:45 PM   Result Value Ref Range    Creatinine, urine 90.00 mg/dL       All Micro Results     Procedure Component Value Units Date/Time    RESPIRATORY VIRUS PANEL W/COVID-19, PCR [[id number]] Collected: 08/31/21 1600    Order Status: Completed Specimen: Nasopharyngeal Updated: 08/31/21 1920     Adenovirus NOT DETECTED        Coronavirus 229E NOT DETECTED        Coronavirus HKU1 NOT DETECTED        Coronavirus CVNL63 NOT DETECTED        Coronavirus OC43 NOT DETECTED        SARS-CoV-2, PCR NOT DETECTED        Metapneumovirus NOT DETECTED        Rhinovirus and Enterovirus NOT DETECTED        Influenza A NOT DETECTED        Influenza B NOT DETECTED        Parainfluenza 1 NOT DETECTED        Parainfluenza 2 NOT DETECTED        Parainfluenza 3 NOT DETECTED        Parainfluenza virus 4 NOT DETECTED        RSV by PCR NOT DETECTED        B. parapertussis, PCR NOT DETECTED        Bordetella pertussis - PCR NOT DETECTED        Chlamydophila pneumoniae DNA, QL, PCR NOT DETECTED        Mycoplasma pneumoniae DNA, QL, PCR NOT DETECTED       CULTURE, BODY FLUID Robert Masters STAIN [[id number]]     Order Status: Canceled Specimen: Body Fluid from Ascitic Fluid           Current Meds:  Current Facility-Administered Medications   Medication Dose Route Frequency    urea (URE-NA) 15 gram packet 1 Packet  1 Packet Oral TID    polyethylene glycol (MIRALAX) packet 17 g  17 g Oral BID    cetirizine (ZYRTEC) tablet 10 mg  10 mg Oral DAILY    FLUoxetine (PROzac) capsule 40 mg  40 mg Oral DAILY    valACYclovir (VALTREX) tablet 1,000 mg  1,000 mg Oral DAILY    pantoprazole (PROTONIX) tablet 40 mg  40 mg Oral ACB    sodium chloride (NS) flush 5-40 mL  5-40 mL IntraVENous Q8H    sodium chloride (NS) flush 5-40 mL  5-40 mL IntraVENous PRN    ondansetron (ZOFRAN ODT) tablet 4 mg  4 mg Oral Q8H PRN    Or    ondansetron (ZOFRAN) injection 4 mg  4 mg IntraVENous Q6H PRN    morphine injection 2 mg  2 mg IntraVENous Q4H PRN    HYDROcodone-homatropine (HYCODAN) 5-1.5 mg/5 mL (5 mL) oral solution 5 mL  5 mL Oral Q4H PRN       Other Studies:    XR CHEST PA LAT    Result Date: 8/31/2021  Exam: XR CHEST PA LAT on 8/31/2021 8:31 PM Clinical History: The Female patient is 36years old  presenting for chronic cough.  Comparison:  Chest x-ray 7/27/2021 Findings:  Frontal and lateral views of the chest were obtained. There is persistent mild elevation right hemidiaphragm. Perihilar/basilar infiltrative changes have improved with only minimal residua. No pleural effusions are seen. The cardiomediastinal silhouette is within normal limits. There are no acute osseous abnormalities. 1. Improved perihilar and basilar infiltrates. CPT code(s) 58834       Signed:  Kaycee Pierre MD    Part of this note may have been written by using a voice dictation software. The note has been proof read but may still contain some grammatical/other typographical errors.

## 2021-09-01 NOTE — PROGRESS NOTES
Mrs Glynn Azul has been accepted for transfer to [facility] to expedite transplant evaluation. [place] will contact Dr Jennifer Maurer and the floor nursing station when a bed is available and provide transportation information.

## 2021-09-01 NOTE — DISCHARGE INSTR - DIET
· Good nutrition is important when healing from an illness, injury, or surgery. Follow any nutrition recommendations given to you during your hospital stay. · If you were given an oral nutrition supplement while in the hospital, continue to take this supplement at home. You can take it with meals, in-between meals, and/or before bedtime. These supplements can be purchased at most local grocery stores, pharmacies, and chain super-stores. · If you have any questions about your diet or nutrition, call the hospital and ask for the dietitian. Santos Villela RD, LD, [facility] [phone]     Follow a high calorie, high protein, low sodium diet. Tips  You may tolerate drinking your nutrients better than eating solid food. Consume small frequent meals/snacks ( 5 to 6 times per day) every 2 to 3 hours. Avoid fasting for longer than 3 to 6 hours during the day. A late evening snack of at least 50 grams of carbohydrate will help to decrease protein or muscle loss. Drink at least 3 high calorie high protein drinks/smoothies per day. 4 to 6 servings per day would be ideal. Include carnation instant breakfast or bone broth powder in drinks. Whole milk and whole milk [place] yogurt contain the most calorie and protein. Add calories with fat. Start with  small lamount fo fat (1 tsp) and increase as tolerated to 1 to 2 TBSP with meals/snacks.  Oils, avacodo, butter, sour cream, cream cheese, heavy cream

## 2021-09-01 NOTE — CONSULTS
GEN GENERIC H&P/CONSULT    Subjective:     37 y/o WF recently diagnosed with cryptogenic cirrhosis, admitted for  abdominal pain. S/p multiple paracentesis, on diuretics Lasix and spironolactone. Renal consult requested for worsening hyponatremia. Ref. Range 2021 05:35   Sodium Latest Ref Range: 136 - 145 mmol/L 121 (L)   Potassium Latest Ref Range: 3.5 - 5.1 mmol/L 5.1   Chloride Latest Ref Range: 98 - 107 mmol/L 92 (L)   CO2 Latest Ref Range: 21 - 32 mmol/L 21   Anion gap Latest Ref Range: 7 - 16 mmol/L 8   Glucose Latest Ref Range: 65 - 100 mg/dL 107 (H)   BUN Latest Ref Range: 6 - 23 MG/DL 17   Creatinine Latest Ref Range: 0.6 - 1.0 MG/DL 0.63   Calcium Latest Ref Range: 8.3 - 10.4 MG/DL 8.2 (L)   GFR est non-AA Latest Ref Range: >60 ml/min/1.73m2 >60   GFR est AA Latest Ref Range: >60 ml/min/1.73m2 >60   Bilirubin, total Latest Ref Range: 0.2 - 1.1 MG/DL 7.8 (H)   Protein, total Latest Ref Range: 6.3 - 8.2 g/dL 5.2 (L)   Albumin Latest Ref Range: 3.5 - 5.0 g/dL 2.3 (L)   Globulin Latest Ref Range: 2.3 - 3.5 g/dL 2.9   A-G Ratio Latest Ref Range: 1.2 - 3.5   0.8 (L)   ALT Latest Ref Range: 12 - 65 U/L 68 (H)   AST Latest Ref Range: 15 - 37 U/L 127 (H)   Alk. phosphatase Latest Ref Range: 50 - 130 U/L 138 (H)      Ref. Range 2021 05:22   Osmolality, serum/plasma Latest Ref Range: 275 - 295 MOSM/kg H2O 257 (L)       Past Medical History:   Diagnosis Date    Depression     medications     GERD (gastroesophageal reflux disease)     medications    Liver disease 2021    patient states that \"something is wrong with my liver\" but states has not been diagnosed.     Low iron       Past Surgical History:   Procedure Laterality Date    HX APPENDECTOMY      HX  SECTION      HX DILATION AND CURETTAGE      HX TONSILLECTOMY      IR PARACENTESIS ABD W IMAGE  2021    IR PARACENTESIS ABD W IMAGE  2021    IR PARACENTESIS ABD W IMAGE  2021    IR PARACENTESIS ABD W IMAGE  8/30/2021      Prior to Admission medications    Medication Sig Start Date End Date Taking? Authorizing Provider   furosemide (LASIX) 20 mg tablet Take 20 mg by mouth daily. Other, MD Jhon   spironolactone (ALDACTONE) 50 mg tablet Take 50 mg by mouth daily. John Dinh MD   ondansetron (ZOFRAN ODT) 4 mg disintegrating tablet Take 1 Tablet by mouth every eight (8) hours as needed for Nausea. Patient not taking: Reported on 7/23/2021 6/26/21   Kian SUBRAMANIAN, DO   lidocaine (LIDODERM) 5 % Apply patch to the affected area for 12 hours a day and remove for 12 hours a day. 4/16/21   Gabriela Velez PA   cetirizine (ZYRTEC) 10 mg tablet Take 10 mg by mouth daily. 7/29/20   Provider, Historical   FLUoxetine (PROzac) 40 mg capsule Take 40 mg by mouth daily. 7/29/20   Provider, Historical   magnesium oxide (MAG-OX) 400 mg tablet Take  by mouth daily. Patient not taking: Reported on 7/23/2021    Provider, Historical   therapeutic multivitamin [facility]) tablet Take 1 Tab by mouth daily. Patient not taking: Reported on 7/23/2021    Provider, Historical   valACYclovir (VALTREX) 1 gram tablet Take 1,000 mg by mouth daily. 7/29/20   Provider, Historical   pantoprazole (PROTONIX) 40 mg tablet Take 40 mg by mouth daily. Provider, Historical     Allergies   Allergen Reactions    Sulfa (Sulfonamide Antibiotics) Rash      Social History     Tobacco Use    Smoking status: Never Smoker    Smokeless tobacco: Never Used   Substance Use Topics    Alcohol use: Not Currently      History reviewed. No pertinent family history. Review of Systems    As above    Objective:       Visit Vitals  BP 97/61 (BP 1 Location: Left upper arm, BP Patient Position: Sitting)   Pulse 98   Temp 97.6 °F (36.4 °C)   Resp 17   SpO2 96%       No intake/output data recorded. No intake/output data recorded.     PE:   NARD  Lung: decreased BS angelita  CV: RR, no JVD  Abd: soft, mildly tender, distended   Ext: no edema Data Review:     Recent Results (from the past 24 hour(s))   RESPIRATORY VIRUS PANEL W/COVID-19, PCR    Collection Time: 08/31/21  4:00 PM    Specimen: Nasopharyngeal   Result Value Ref Range    Adenovirus NOT DETECTED NOTDET      Coronavirus 229E NOT DETECTED NOTDET      Coronavirus HKU1 NOT DETECTED NOTDET      Coronavirus CVNL63 NOT DETECTED NOTDET      Coronavirus OC43 NOT DETECTED NOTDET      SARS-CoV-2, PCR NOT DETECTED NOTDET      Metapneumovirus NOT DETECTED NOTDET      Rhinovirus and Enterovirus NOT DETECTED NOTDET      Influenza A NOT DETECTED NOTDET      Influenza B NOT DETECTED NOTDET      Parainfluenza 1 NOT DETECTED NOTDET      Parainfluenza 2 NOT DETECTED NOTDET      Parainfluenza 3 NOT DETECTED NOTDET      Parainfluenza virus 4 NOT DETECTED NOTDET      RSV by PCR NOT DETECTED NOTDET      B. parapertussis, PCR NOT DETECTED NOTDET      Bordetella pertussis - PCR NOT DETECTED NOTDET      Chlamydophila pneumoniae DNA, QL, PCR NOT DETECTED NOTDET      Mycoplasma pneumoniae DNA, QL, PCR NOT DETECTED NOTDET     METABOLIC PANEL, COMPREHENSIVE    Collection Time: 09/01/21  5:35 AM   Result Value Ref Range    Sodium 121 (L) 136 - 145 mmol/L    Potassium 5.1 3.5 - 5.1 mmol/L    Chloride 92 (L) 98 - 107 mmol/L    CO2 21 21 - 32 mmol/L    Anion gap 8 7 - 16 mmol/L    Glucose 107 (H) 65 - 100 mg/dL    BUN 17 6 - 23 MG/DL    Creatinine 0.63 0.6 - 1.0 MG/DL    GFR est AA >60 >60 ml/min/1.73m2    GFR est non-AA >60 >60 ml/min/1.73m2    Calcium 8.2 (L) 8.3 - 10.4 MG/DL    Bilirubin, total 7.8 (H) 0.2 - 1.1 MG/DL    ALT (SGPT) 68 (H) 12 - 65 U/L    AST (SGOT) 127 (H) 15 - 37 U/L    Alk.  phosphatase 138 (H) 50 - 130 U/L    Protein, total 5.2 (L) 6.3 - 8.2 g/dL    Albumin 2.3 (L) 3.5 - 5.0 g/dL    Globulin 2.9 2.3 - 3.5 g/dL    A-G Ratio 0.8 (L) 1.2 - 3.5     PROTHROMBIN TIME + INR    Collection Time: 09/01/21  5:35 AM   Result Value Ref Range    Prothrombin time 18.7 (H) 12.6 - 14.5 sec    INR 1.5 Assessment:     Principal Problem:    Elevated LFTs (8/29/2021)    Active Problems:    Cirrhosis of liver with ascites ([address] (7/24/2021)      Abdominal pain (8/29/2021)      Hyponatremia (8/29/2021)      Elevated lactic acid level (8/29/2021)        Plan:     Hypo-osmolar hyponatremia: due to liver cirrhosis, may be aggravated by diuretics. Check urine NA.   Hold diuretic today  Try Urea with fluid restriction   May hold Prozac     Thanks   Dr Reba Soto

## 2021-09-01 NOTE — PROGRESS NOTES
Spoke to [employer] from [facility] services to coordinate transportation of patient to [facility]. Was told to call tomorrow to coordinate drive.

## 2021-09-01 NOTE — PROGRESS NOTES
[facility] Progress Note         Admit Date:  8/28/2021    Today's Date:  9/1/2021    CC:  abd distension    Subjective:     Patient resting quietly, abd mildly more distended    Medications:   Current Facility-Administered Medications   Medication Dose Route Frequency    urea (URE-NA) 15 gram packet 1 Packet  1 Packet Oral TID    polyethylene glycol (MIRALAX) packet 17 g  17 g Oral BID    cetirizine (ZYRTEC) tablet 10 mg  10 mg Oral DAILY    FLUoxetine (PROzac) capsule 40 mg  40 mg Oral DAILY    valACYclovir (VALTREX) tablet 1,000 mg  1,000 mg Oral DAILY    pantoprazole (PROTONIX) tablet 40 mg  40 mg Oral ACB    sodium chloride (NS) flush 5-40 mL  5-40 mL IntraVENous Q8H    sodium chloride (NS) flush 5-40 mL  5-40 mL IntraVENous PRN    ondansetron (ZOFRAN ODT) tablet 4 mg  4 mg Oral Q8H PRN    Or    ondansetron (ZOFRAN) injection 4 mg  4 mg IntraVENous Q6H PRN    morphine injection 2 mg  2 mg IntraVENous Q4H PRN    HYDROcodone-homatropine (HYCODAN) 5-1.5 mg/5 mL (5 mL) oral solution 5 mL  5 mL Oral Q4H PRN       Review of Systems:  ROS was obtained, with pertinent positives as listed above. No chest pain or SOB. Diet:      Objective:   Vitals:  Visit Vitals  BP 97/61 (BP 1 Location: Left upper arm, BP Patient Position: Sitting)   Pulse 98   Temp 97.6 °F (36.4 °C)   Resp 17   Ht 5' 1\" (1.549 m)   SpO2 96%   BMI 21.35 kg/m²     Intake/Output:  No intake/output data recorded. No intake/output data recorded. Exam:  General appearance: alert, cooperative, no distress  Lungs: clear to auscultation bilaterally anteriorly  Heart: regular rate and rhythm  Abdomen: soft, non-tender.  Bowel sounds normal. No masses, no organomegaly  Extremities: extremities normal, atraumatic, no cyanosis or edema  Neuro:  alert and oriented    Data Review (Labs):    Recent Labs     09/01/21  0535 08/31/21  0522 08/30/21  0455   WBC  --  14.0* 15.2*   HGB  --  10.4* 10.6*   HCT  --  29.5* 29.4*   PLT  -- 158 201   MCV  --  91.3 90.5   * 125* 126*   K 5.1 4.9 4.6   CL 92* 95* 97*   CO2 21 23 22   BUN 17 15 18   CREA 0.63 0.61 0.66   CA 8.2* 8.4 8.5   * 109* 108*   * 146* 147*   * 135* 129*   ALT 68* 71* 69*   TBILI 7.8* 7.7* 6.7*   ALB 2.3* 2.5* 2.7*   TP 5.2* 5.6* 5.8*   PTP 18.7* 19.6*  --    INR 1.5 1.6  --        Assessment:     Principal Problem:    Elevated LFTs (8/29/2021)    Active Problems:    Cirrhosis of liver with ascites ([facility]) (7/24/2021)      Abdominal pain (8/29/2021)      Hyponatremia (8/29/2021)      Elevated lactic acid level (8/29/2021)        Plan:     Cirrhosis-  Complicated by recurrent ascites, appreciate input from Nephrology, INR mildly improved with vit K she will receive second dosage today. I await a call back from [facility] transplant. I will see if transfer is an option, or if there is any way we can finish pre- transplant evaluation locally. She may require TIPs.

## 2021-09-01 NOTE — PROGRESS NOTES
Pt discussed in Md rounds this am. Sw received a call this am from Toby Pierre in transfers from [place]. She verbalized that this was pt and family preference and not Md's request. Sw is not sure this is accurate information. Sw was asked to fax clinical info to ([phone]). Await return call.    Ju Corea

## 2021-09-02 VITALS
SYSTOLIC BLOOD PRESSURE: 98 MMHG | OXYGEN SATURATION: 91 % | HEART RATE: 91 BPM | TEMPERATURE: 97.9 F | BODY MASS INDEX: 21.35 KG/M2 | DIASTOLIC BLOOD PRESSURE: 39 MMHG | HEIGHT: 61 IN | RESPIRATION RATE: 20 BRPM

## 2021-09-02 PROBLEM — E43 SEVERE PROTEIN-CALORIE MALNUTRITION (HCC): Status: ACTIVE | Noted: 2021-09-02

## 2021-09-02 LAB
ANION GAP SERPL CALC-SCNC: 6 MMOL/L (ref 7–16)
BASOPHILS # BLD: 0 K/UL (ref 0–0.2)
BASOPHILS NFR BLD: 0 % (ref 0–2)
BUN SERPL-MCNC: 24 MG/DL (ref 6–23)
CALCIUM SERPL-MCNC: 8.6 MG/DL (ref 8.3–10.4)
CHLORIDE SERPL-SCNC: 92 MMOL/L (ref 98–107)
CO2 SERPL-SCNC: 23 MMOL/L (ref 21–32)
CREAT SERPL-MCNC: 0.68 MG/DL (ref 0.6–1)
DIFFERENTIAL METHOD BLD: ABNORMAL
EOSINOPHIL # BLD: 0.2 K/UL (ref 0–0.8)
EOSINOPHIL NFR BLD: 1 % (ref 0.5–7.8)
ERYTHROCYTE [DISTWIDTH] IN BLOOD BY AUTOMATED COUNT: 21.3 % (ref 11.9–14.6)
GLUCOSE SERPL-MCNC: 95 MG/DL (ref 65–100)
HCT VFR BLD AUTO: 30.3 % (ref 35.8–46.3)
HGB BLD-MCNC: 10.8 G/DL (ref 11.7–15.4)
IMM GRANULOCYTES # BLD AUTO: 0.1 K/UL (ref 0–0.5)
IMM GRANULOCYTES NFR BLD AUTO: 1 % (ref 0–5)
INR PPP: 1.5
LYMPHOCYTES # BLD: 2.2 K/UL (ref 0.5–4.6)
LYMPHOCYTES NFR BLD: 16 % (ref 13–44)
MCH RBC QN AUTO: 32.7 PG (ref 26.1–32.9)
MCHC RBC AUTO-ENTMCNC: 35.6 G/DL (ref 31.4–35)
MCV RBC AUTO: 91.8 FL (ref 79.6–97.8)
MONOCYTES # BLD: 1.3 K/UL (ref 0.1–1.3)
MONOCYTES NFR BLD: 10 % (ref 4–12)
NEUTS SEG # BLD: 9.9 K/UL (ref 1.7–8.2)
NEUTS SEG NFR BLD: 73 % (ref 43–78)
NRBC # BLD: 0 K/UL (ref 0–0.2)
PLATELET # BLD AUTO: 216 K/UL (ref 150–450)
PMV BLD AUTO: 11.2 FL (ref 9.4–12.3)
POTASSIUM SERPL-SCNC: 5.2 MMOL/L (ref 3.5–5.1)
PROTHROMBIN TIME: 18.9 SEC (ref 12.6–14.5)
RBC # BLD AUTO: 3.3 M/UL (ref 4.05–5.2)
SODIUM SERPL-SCNC: 121 MMOL/L (ref 136–145)
WBC # BLD AUTO: 13.7 K/UL (ref 4.3–11.1)

## 2021-09-02 PROCEDURE — 85025 COMPLETE CBC W/AUTO DIFF WBC: CPT

## 2021-09-02 PROCEDURE — 80048 BASIC METABOLIC PNL TOTAL CA: CPT

## 2021-09-02 PROCEDURE — 74011250637 HC RX REV CODE- 250/637: Performed by: PHYSICIAN ASSISTANT

## 2021-09-02 PROCEDURE — 36415 COLL VENOUS BLD VENIPUNCTURE: CPT

## 2021-09-02 PROCEDURE — 85610 PROTHROMBIN TIME: CPT

## 2021-09-02 PROCEDURE — 74011250637 HC RX REV CODE- 250/637: Performed by: INTERNAL MEDICINE

## 2021-09-02 PROCEDURE — 74011250637 HC RX REV CODE- 250/637: Performed by: FAMILY MEDICINE

## 2021-09-02 RX ORDER — BENZONATATE 100 MG/1
100 CAPSULE ORAL
Qty: 30 CAPSULE | Refills: 0 | Status: SHIPPED
Start: 2021-09-02 | End: 2021-09-12

## 2021-09-02 RX ORDER — BENZONATATE 100 MG/1
100 CAPSULE ORAL
Status: DISCONTINUED | OUTPATIENT
Start: 2021-09-02 | End: 2021-09-02 | Stop reason: HOSPADM

## 2021-09-02 RX ADMIN — POLYETHYLENE GLYCOL 3350 17 G: 17 POWDER, FOR SOLUTION ORAL at 08:55

## 2021-09-02 RX ADMIN — Medication 1 PACKET: at 08:56

## 2021-09-02 RX ADMIN — VALACYCLOVIR HYDROCHLORIDE 1000 MG: 500 TABLET, FILM COATED ORAL at 08:56

## 2021-09-02 RX ADMIN — HYDROCODONE BITARTRATE AND HOMATROPINE METHYLBROMIDE 5 ML: 5; 1.5 SOLUTION ORAL at 11:27

## 2021-09-02 RX ADMIN — FLUOXETINE 40 MG: 20 CAPSULE ORAL at 08:56

## 2021-09-02 RX ADMIN — PANTOPRAZOLE SODIUM 40 MG: 40 TABLET, DELAYED RELEASE ORAL at 06:04

## 2021-09-02 RX ADMIN — Medication 10 ML: at 06:04

## 2021-09-02 RX ADMIN — CETIRIZINE HYDROCHLORIDE 10 MG: 10 TABLET, FILM COATED ORAL at 08:56

## 2021-09-02 RX ADMIN — Medication 1 TABLET: at 08:56

## 2021-09-02 NOTE — PROGRESS NOTES
Noted in pt chart, needs to have 24 hr urine collection. Order stated to start at [address] 24 hr urine not started per checking pt room. Notified MD on call to verify to continue with the order due to pt going to [facility] for liver transplant consult. Dr. Savanna Farley gave order to cancel it. Safety measures in place.

## 2021-09-02 NOTE — PROGRESS NOTES
Care Management Interventions  PCP Verified by CM: Yes  Mode of Transport at Discharge: BLS  Transition of Care Consult (CM Consult): Discharge Planning, Other (transferred to [facility])  Current Support Network: Lives with Spouse  Confirm Follow Up Transport: Family  Discharge Location  Discharge Placement: Transferred to higher level of care    Radha was informed by house supervisor that pt had been accepted to [facility] and that transport was arranged. Refer to Rn notes. Sw added more clinicals to packet and assisted nursing by completing ambulance form.    Cheryl Johnson

## 2021-09-02 NOTE — DISCHARGE SUMMARY
Hospitalist Discharge Summary   Admit Date:  2021  8:57 PM   Name:  Nayana Ambrose   Age:  36 y.o. Sex:  female  :  1980   MRN:  [id number]   PCP:  Orquidea Teran MD    Presenting Complaint: Abdominal Pain and Back Pain    Initial Admission Diagnosis: Abdominal pain [R10.9]     Problem List for this Hospitalization:  Hospital Problems as of 2021 Never Reviewed        Codes Class Noted - Resolved POA    Severe protein-calorie malnutrition ([facility] [address] ICD-10-CM: E43  ICD-9-CM: 898  2021 - Present Yes        Abdominal pain ICD-10-CM: R10.9  ICD-9-CM: 789.00  2021 - Present Unknown        Hyponatremia ICD-10-CM: E87.1  ICD-9-CM: 276.1  2021 - Present Unknown        * (Principal) Elevated LFTs ICD-10-CM: R79.89  ICD-9-CM: 790.6  2021 - Present Unknown        Elevated lactic acid level ICD-10-CM: R79.89  ICD-9-CM: 276.2  2021 - Present Unknown        Cirrhosis of liver with ascites ([facility] [address] (Chronic) ICD-10-CM: K74.60, R18.8  ICD-9-CM: 571.5  2021 - Present Yes            Did Patient have Sepsis (YES OR NO): No      Hospital Course:  37 y/o WF recently diagnosed with cryptogenic cirrhosis, s/p para x4 since 2021, who was admitted on  with progressive abdominal pain and concern for SBP. Afebrile, WBCs 14k, CT a/p w/o acute process. Abx held given stability. Para on  with 4L removed, not c/w SBP. GI following, in touch with [facility] regarding a possible transfer or finishing her pre-transplant testing here. Hyponatremia progressive and Nephrology consulted on , diuretics discontinued and started on UreNa. On day of discharge she has been accepted at [facility] with  time of 12pm. She is stable for discharge at this time. Disposition: [facility]  Diet: ADULT DIET Regular; Low Sodium (2 gm)  ADULT ORAL NUTRITION SUPPLEMENT Lunch, Dinner; Frozen Supplement  Code Status: Full Code    Follow Up Orders:   Follow-up Appointments Procedures    FOLLOW UP VISIT Appointment in: 3 - 5 Days As directed by [facility]     As directed by mycujoo Status:   Standing     Number of Occurrences:   1     Order Specific Question:   Appointment in     Answer:   3 - 5 Days       Follow-up Information     Follow up With Specialties Details Why Contact Prosper Zamudio MD Internal Medicine   [address]  [phone]            Time spent in patient discharge and coordination 40 minutes. Plan was discussed with patient, nursing. All questions answered. Patient was stable at time of discharge. Instructions given to call a physician or return if any concerns. Discharge Info:   Current Discharge Medication List      START taking these medications    Details   benzonatate (TESSALON) 100 mg capsule Take 1 Capsule by mouth three (3) times daily as needed for Cough for up to 10 days. Qty: 30 Capsule, Refills: 0  Start date: 9/2/2021, End date: 9/12/2021      urea (URE-NA) 15 gram packet Take 1 Packet by mouth three (3) times daily for 7 days. Qty: 21 Packet, Refills: 0  Start date: 9/2/2021, End date: 9/9/2021         CONTINUE these medications which have NOT CHANGED    Details   furosemide (LASIX) 20 mg tablet Take 20 mg by mouth daily. spironolactone (ALDACTONE) 50 mg tablet Take 50 mg by mouth daily. ondansetron (ZOFRAN ODT) 4 mg disintegrating tablet Take 1 Tablet by mouth every eight (8) hours as needed for Nausea. Qty: 8 Tablet, Refills: 2      lidocaine (LIDODERM) 5 % Apply patch to the affected area for 12 hours a day and remove for 12 hours a day. Qty: 30 Each, Refills: 0      cetirizine (ZYRTEC) 10 mg tablet Take 10 mg by mouth daily. FLUoxetine (PROzac) 40 mg capsule Take 40 mg by mouth daily. magnesium oxide (MAG-OX) 400 mg tablet Take  by mouth daily. therapeutic multivitamin (THERAGRAN) tablet Take 1 Tab by mouth daily.       valACYclovir (VALTREX) 1 gram tablet Take 1,000 mg by mouth daily. pantoprazole (PROTONIX) 40 mg tablet Take 40 mg by mouth daily. Procedures done this admission:  * No surgery found *    Consults this admission:  IP CONSULT TO GASTROENTEROLOGY  IP CONSULT TO NEPHROLOGY    Echocardiogram/EKG results:  No results found for this visit on 08/28/21. EKG Results     None          Diagnostic Imaging/Tests:   XR CHEST PA LAT    Result Date: 8/31/2021  Exam: XR CHEST PA LAT on 8/31/2021 8:31 PM Clinical History: The Female patient is 36years old  presenting for chronic cough. Comparison:  Chest x-ray 7/27/2021 Findings:  Frontal and lateral views of the chest were obtained. There is persistent mild elevation right hemidiaphragm. Perihilar/basilar infiltrative changes have improved with only minimal residua. No pleural effusions are seen. The cardiomediastinal silhouette is within normal limits. There are no acute osseous abnormalities. 1. Improved perihilar and basilar infiltrates. CPT code(s) 05135     CT ABD PELV WO CONT    Result Date: 8/29/2021  EXAM: CT ABD PELV WO CONT HISTORY: Severe abdominal pain, elevated WBC, elevated LA. TECHNIQUE: Axial images of the abdomen and pelvis were performed without intravenous contrast. Images were obtained in the axial plane and coronal reformatted images were created and reviewed. Dose reduction technique used: Automated exposure control/Adjustment of the mA and/or kV according to patient size/Use of iterative reconstruction technique. COMPARISON: 6/26/2021 FINDINGS: Visualized lung bases show a small right pleural effusion. There is mild right basilar atelectasis. Mediastinum are unremarkable. The liver shows mildly nodular margins. There is no discrete mass, allowing for lack of intravenous contrast. The spleen, pancreas, adrenal glands, and kidneys, are within normal limits. The bladder is not distended and not assessed. Visualization of the gallbladder is limited.  Distal esophagus and stomach are unremarkable. Small and large bowel are without evidence of obstruction. The appendix is surgically absent. Lack of intravenous contrast limits assessment of the bowel wall; however, as seen there is no obvious thickening. There is a moderate amount of retained fecal material in the colon. There is a large volume of ascites. This has increased significantly. No evidence of free air. No obvious pathologic adenopathy. No abdominal aortic aneurysm. Osseous structures are without evidence of acute fracture or suspicious lesion. There is now a large volume of ascites. This has increased significantly in the interval. Interval development of a small right pleural effusion with mild right basilar atelectasis. No obvious bowel obstruction or visible bowel wall thickening. Other findings as above. IR PARACENTESIS ABD W IMAGE    Result Date: 8/30/2021  PROCEDURE: Ultrasound-guided paracentesis Procedural Personnel Attending physician(s):  Jimbo Ramsay M.D. Pre-procedure diagnosis:  59-year-old woman with cirrhosis, portal hypertension, and recurrent ascites presents with abdominal distention and pain. Elevated white blood cell count yesterday. Post-procedure diagnosis:  Same Indication:  Ascites with pain or pressure symptoms Additional clinical history:  Liver biopsy 04/08/2021--stage 4of 4 fibrosis = cirrhosis. Paracentesis 7/27/2021, 8/9/2021, 8/23/2021. CT scan 8/29/2021, 6/26/2021, 04/16/2021. Complications: No immediate complications. Ultrasound-guided paracentesis with drainage of 4110 mL of thin yellow fluid. Plan:  The patient is been discharged home in stable condition. She has requested referral to the Interventional Radiology office for TIPS evaluation.  _______________________________________________________________ PROCEDURE SUMMARY: - Limited abdominal ultrasound - Ultrasound-guided paracentesis PROCEDURE DETAILS: Pre-procedure Consent:  Informed written and oral consent for the procedure was obtained after explanation of risks (including, but not limitted to:  hemorrhage, infection, visceral injury) benefits and alternatives. The patient's questions were answered to their satisfaction. They stated understanding and requested that we proceed. Final verification:  A time-out identifying the patient and planned procedure was performed prior to this procedure. Preparation:  Maximal sterile barrier technique (including: Sterile ultrasound probe cover, sterile ultrasound gel, cap, mask, sterile gloves, sterile sheet, hand hygiene, and cutaneous antisepsis) was used. Anesthesia/sedation Level of anesthesia/sedation:  No sedation Anesthesia/sedation administered by:  Not applicable Total intra-service sedation time (minutes):  Not applicable Initial abdominal ultrasound Initial abdominal ultrasound was performed. Findings:  Large volume ascites. A safe window for paracentesis was identified and utilized. Paracentesis Local anesthesia was administered. The peritoneal cavity was accessed and fluid return confirmed appropriate drain catheter position. Ascites was drained. The catheter was removed. Sterile glue was placed on the skin. Paracentesis access technique:  Real-time ultrasound guidance Catheter placed:  8-Lithuanian straight, multiside hole catheter Post-drainage ultrasound:  Not performed Additional Details Additional description of procedure:  None Equipment details:  None Specimens removed:  Ascites fluid Estimated blood loss (mL):  Less than 10 Standardized report: [email or web address] Attestation Signer name: Travis Aguilar M.D. I attest that I personally performed the entire procedure. I reviewed the stored images and agree with the report as written. IR PARACENTESIS ABD W IMAGE    Result Date: 8/23/2021  Title: Ultrasound guided paracentesis. History: Cirrhosis, ascites. :  Gloria Flaherty PA-C Supervising Physician: Matheus Aguilar M.D.  Consent: Informed written and oral consent was obtained from the patient after the risks and benefits were discussed. All questions were answered and the patient requested that we proceed. Procedure:  Maximal sterile barrier technique was used. Following routine prep and drape of the abdomen, a local field block with 1% lidocaine was achieved. Ultrasound evaluation was performed. Fluid was identified in the peritoneal cavity. Using real-time ultrasound guidance, the peritoneal cavity was accessed with an 8 Nepali centesis catheter. The catheter was connected to wall suction. A total of 4300 cc of yellow fluid was removed. The catheter was removed and a bandage applied. Complications: None. Findings: Large-volume ascites. Uncomplicated ultrasound guided paracentesis. IR PARACENTESIS ABD W IMAGE    Result Date: 8/9/2021  Title: Ultrasound guided paracentesis. History: 44-year-old female with cirrhosis, ascites, history of hepatitis B infection. :  Bry Mcdonnell PA-C Supervising Physician: Esther Yoo M.D. Consent:  Informed written and oral consent was obtained from the patient after the risks and benefits were discussed. All questions were answered and the patient requested that we proceed. Procedure:  Maximal sterile barrier technique was used. Following routine prep and drape of the abdomen, a local field block with 1% lidocaine was achieved. Ultrasound evaluation was performed. Fluid was identified in the peritoneal cavity. Using real-time ultrasound guidance, the peritoneal cavity was accessed with an 8 Nepali centesis catheter. The catheter was connected to wall suction. A total of 3800 cc of thin yellow fluid was removed. The catheter was removed and a bandage applied. Complications: None. Findings: Large-volume ascites. Uncomplicated ultrasound guided paracentesis.        All Micro Results     Procedure Component Value Units Date/Time    RESPIRATORY VIRUS PANEL W/COVID-19, PCR [[id number]] Collected: 08/31/21 1600    Order Status: Completed Specimen: Nasopharyngeal Updated: 08/31/21 1920     Adenovirus NOT DETECTED        Coronavirus 229E NOT DETECTED        Coronavirus HKU1 NOT DETECTED        Coronavirus CVNL63 NOT DETECTED        Coronavirus OC43 NOT DETECTED        SARS-CoV-2, PCR NOT DETECTED        Metapneumovirus NOT DETECTED        Rhinovirus and Enterovirus NOT DETECTED        Influenza A NOT DETECTED        Influenza B NOT DETECTED        Parainfluenza 1 NOT DETECTED        Parainfluenza 2 NOT DETECTED        Parainfluenza 3 NOT DETECTED        Parainfluenza virus 4 NOT DETECTED        RSV by PCR NOT DETECTED        B. parapertussis, PCR NOT DETECTED        Bordetella pertussis - PCR NOT DETECTED        Chlamydophila pneumoniae DNA, QL, PCR NOT DETECTED        Mycoplasma pneumoniae DNA, QL, PCR NOT DETECTED       CULTURE, BODY FLUID Jennifer Petit STAIN [[id number]]     Order Status: Canceled Specimen:  Body Fluid from Ascitic Fluid           Labs: Results:       BMP, Mg, Phos Recent Labs     09/02/21  0521 09/01/21  0535 08/31/21  0522   * 121* 125*   K 5.2* 5.1 4.9   CL 92* 92* 95*   CO2 23 21 23   AGAP 6* 8 7   BUN 24* 17 15   CREA 0.68 0.63 0.61   CA 8.6 8.2* 8.4   GLU 95 107* 109*      CBC Recent Labs     09/02/21  0521 08/31/21  0522   WBC 13.7* 14.0*   RBC 3.30* 3.23*   HGB 10.8* 10.4*   HCT 30.3* 29.5*    158   GRANS 73 75   LYMPH 16 13   EOS 1 1   MONOS 10 10   BASOS 0 0   IG 1 0   ANEU 9.9* 10.4*   ABL 2.2 1.9   TONYA 0.2 0.2   ABM 1.3 1.4*   ABB 0.0 0.0   AIG 0.1 0.1      LFT Recent Labs     09/01/21  0535 08/31/21  0522   ALT 68* 71*   * 146*   TP 5.2* 5.6*   ALB 2.3* 2.5*   GLOB 2.9 3.1   AGRAT 0.8* 0.8*      Cardiac Testing No results found for: BNPP, BNP, CPK, RCK1, RCK2, RCK3, RCK4, CKMB, CKNDX, CKND1, TROPT, TROIQ   Coagulation Tests Lab Results   Component Value Date/Time    Prothrombin time 18.9 (H) 09/02/2021 05:21 AM    Prothrombin time 18.7 (H) 09/01/2021 05:35 AM    Prothrombin time 19.6 (H) 08/31/2021 05:22 AM    INR 1.5 09/02/2021 05:21 AM    INR 1.5 09/01/2021 05:35 AM    INR 1.6 08/31/2021 05:22 AM      A1c No results found for: HBA1C, [id number]   Lipid Panel No results found for: CHOL, CHOLPOCT, CHOLX, CHLST, CHOLV, 028926, HDL, HDLP, LDL, LDLC, DLDLP, 993722, VLDLC, VLDL, TGLX, TRIGL, TRIGP, TGLPOCT, CHHD, CHHDX   Thyroid Panel No results found for: TSH, T4, FT4, TT3, T3U, TSHEXT     Most Recent UA Lab Results   Component Value Date/Time    Color GINO 07/24/2021 02:43 AM    Appearance CLEAR 07/24/2021 02:43 AM    Specific gravity 1.016 07/24/2021 02:43 AM    pH (UA) 7.0 07/24/2021 02:43 AM    Protein Negative 07/24/2021 02:43 AM    Glucose Negative 07/24/2021 02:43 AM    Ketone TRACE (A) 07/24/2021 02:43 AM    Bilirubin SMALL (A) 07/24/2021 02:43 AM    Blood TRACE (A) 07/24/2021 02:43 AM    Urobilinogen 2.0 (H) 07/24/2021 02:43 AM    Nitrites Negative 07/24/2021 02:43 AM    Leukocyte Esterase MODERATE (A) 07/24/2021 02:43 AM    WBC 3-5 08/28/2021 10:37 PM    RBC 3-5 08/28/2021 10:37 PM    Epithelial cells 3-5 08/28/2021 10:37 PM    Bacteria 0 08/28/2021 10:37 PM    Casts 0 08/28/2021 10:37 PM    Crystals, urine 0 08/28/2021 10:37 PM    Mucus 0 08/28/2021 10:37 PM    Other observations RESULTS VERIFIED MANUALLY 04/16/2021 09:33 AM          All Labs from Last 24 Hrs:  Recent Results (from the past 24 hour(s))   SODIUM, UR, RANDOM    Collection Time: 09/01/21  1:45 PM   Result Value Ref Range    Sodium,urine random <5 MMOL/L   CREATININE, UR, RANDOM    Collection Time: 09/01/21  1:45 PM   Result Value Ref Range    Creatinine, urine 42.38 mg/dL   METABOLIC PANEL, BASIC    Collection Time: 09/02/21  5:21 AM   Result Value Ref Range    Sodium 121 (L) 136 - 145 mmol/L    Potassium 5.2 (H) 3.5 - 5.1 mmol/L    Chloride 92 (L) 98 - 107 mmol/L    CO2 23 21 - 32 mmol/L    Anion gap 6 (L) 7 - 16 mmol/L    Glucose 95 65 - 100 mg/dL    BUN 24 (H) 6 - 23 MG/DL Creatinine 0.68 0.6 - 1.0 MG/DL    GFR est AA >60 >60 ml/min/1.73m2    GFR est non-AA >60 >60 ml/min/1.73m2    Calcium 8.6 8.3 - 10.4 MG/DL   CBC WITH AUTOMATED DIFF    Collection Time: 09/02/21  5:21 AM   Result Value Ref Range    WBC 13.7 (H) 4.3 - 11.1 K/uL    RBC 3.30 (L) 4.05 - 5.2 M/uL    HGB 10.8 (L) 11.7 - 15.4 g/dL    HCT 30.3 (L) 35.8 - 46.3 %    MCV 91.8 79.6 - 97.8 FL    MCH 32.7 26.1 - 32.9 PG    MCHC 35.6 (H) 31.4 - 35.0 g/dL    RDW 21.3 (H) 11.9 - 14.6 %    PLATELET 557 132 - 428 K/uL    MPV 11.2 9.4 - 12.3 FL    ABSOLUTE NRBC 0.00 0.0 - 0.2 K/uL    DF AUTOMATED      NEUTROPHILS 73 43 - 78 %    LYMPHOCYTES 16 13 - 44 %    MONOCYTES 10 4.0 - 12.0 %    EOSINOPHILS 1 0.5 - 7.8 %    BASOPHILS 0 0.0 - 2.0 %    IMMATURE GRANULOCYTES 1 0.0 - 5.0 %    ABS. NEUTROPHILS 9.9 (H) 1.7 - 8.2 K/UL    ABS. LYMPHOCYTES 2.2 0.5 - 4.6 K/UL    ABS. MONOCYTES 1.3 0.1 - 1.3 K/UL    ABS. EOSINOPHILS 0.2 0.0 - 0.8 K/UL    ABS. BASOPHILS 0.0 0.0 - 0.2 K/UL    ABS. IMM.  GRANS. 0.1 0.0 - 0.5 K/UL   PROTHROMBIN TIME + INR    Collection Time: 09/02/21  5:21 AM   Result Value Ref Range    Prothrombin time 18.9 (H) 12.6 - 14.5 sec    INR 1.5         Current Med List in Hospital:   Current Facility-Administered Medications   Medication Dose Route Frequency    benzonatate (TESSALON) capsule 100 mg  100 mg Oral TID PRN    urea (URE-NA) 15 gram packet 1 Packet  1 Packet Oral TID    multivitamin, tx-iron-ca-min (THERA-M w/ IRON) tablet 1 Tablet  1 Tablet Oral DAILY    polyethylene glycol (MIRALAX) packet 17 g  17 g Oral BID    cetirizine (ZYRTEC) tablet 10 mg  10 mg Oral DAILY    FLUoxetine (PROzac) capsule 40 mg  40 mg Oral DAILY    valACYclovir (VALTREX) tablet 1,000 mg  1,000 mg Oral DAILY    pantoprazole (PROTONIX) tablet 40 mg  40 mg Oral ACB    sodium chloride (NS) flush 5-40 mL  5-40 mL IntraVENous Q8H    sodium chloride (NS) flush 5-40 mL  5-40 mL IntraVENous PRN    ondansetron (ZOFRAN ODT) tablet 4 mg  4 mg Oral Q8H PRN    Or    ondansetron (ZOFRAN) injection 4 mg  4 mg IntraVENous Q6H PRN    morphine injection 2 mg  2 mg IntraVENous Q4H PRN    HYDROcodone-homatropine (HYCODAN) 5-1.5 mg/5 mL (5 mL) oral solution 5 mL  5 mL Oral Q4H PRN       Allergies   Allergen Reactions    Sulfa (Sulfonamide Antibiotics) Rash       There is no immunization history on file for this patient. Recent Vital Data:  Patient Vitals for the past 24 hrs:   Temp Pulse Resp BP SpO2   09/02/21 0745 97.9 °F (36.6 °C) 91 20 (!) 98/39 91 %   09/02/21 0352 98 °F (36.7 °C) 93 -- (!) 94/57 96 %   09/02/21 0030 98.6 °F (37 °C) 90 18 96/60 95 %   09/01/21 1946 98.8 °F (37.1 °C) 93 18 (!) 95/58 98 %   09/01/21 1640 99.3 °F (37.4 °C) 97 17 (!) 97/59 97 %   09/01/21 1134 97.6 °F (36.4 °C) 98 17 97/61 96 %     Oxygen Therapy  O2 Sat (%): 91 % (09/02/21 0745)  Pulse via Oximetry: 94 beats per minute (08/28/21 2243)  O2 Device: None (Room air) (09/02/21 0856)    Estimated body mass index is 21.35 kg/m² as calculated from the following:    Height as of this encounter: 5' 1\" (1.549 m). Weight as of 8/23/21: 51.3 kg (113 lb). Intake/Output Summary (Last 24 hours) at 9/2/2021 1011  Last data filed at 9/1/2021 1445  Gross per 24 hour   Intake 295 ml   Output --   Net 295 ml         Physical Exam:  General:          Thin. No overt distress  Head:               Normocephalic, atraumatic  Eyes:               Sclerae appear jaundice. Pupils equally round. HENT:             Nares appear normal, no drainage. Moist mucous membranes  Neck:               No restricted ROM. Trachea midline  CV:                  RRR. No m/r/g. No JVD  Lungs:             CTAB. No wheezing, rhonchi, or rales. Appears even, unlabored  Abdomen: Bowel sounds present. Soft, nontender, nondistended. Extremities:     Warm and dry. No cyanosis or clubbing. Mild edema. Skin:                No rashes. + jaundice. Normal turgor.   Normal coloration  Neuro:             Cranial nerves II-XII grossly intact. Sensation intact  Psych:             Normal mood and affect.   Alert and oriented x3    Signed:  Cara Grajeda NP

## 2021-09-02 NOTE — PROGRESS NOTES
Patient discharging [facility] and transferring to facility in [facility]. Report called to [phone] at 1219. Dorinda Oneil RN received report. Vitals stable for transport. Transfer packet given to transport.

## 2021-09-02 NOTE — PROGRESS NOTES
Spoke to [facility]. Transport arranged.   time 12 pm. Will pass on to day shift RN

## 2021-09-07 ENCOUNTER — HOSPITAL ENCOUNTER (OUTPATIENT)
Dept: INTERVENTIONAL RADIOLOGY/VASCULAR | Age: 41
Discharge: HOME | End: 2021-09-07
Attending: INTERNAL MEDICINE

## 2021-09-21 ENCOUNTER — APPOINTMENT (RX ONLY)
Dept: URBAN - METROPOLITAN AREA CLINIC 349 | Facility: CLINIC | Age: 41
Setting detail: DERMATOLOGY
End: 2021-09-21

## 2021-09-21 DIAGNOSIS — L81.2 FRECKLES: ICD-10-CM

## 2021-09-21 DIAGNOSIS — Z12.83 ENCOUNTER FOR SCREENING FOR MALIGNANT NEOPLASM OF SKIN: ICD-10-CM

## 2021-09-21 DIAGNOSIS — D22 MELANOCYTIC NEVI: ICD-10-CM

## 2021-09-21 DIAGNOSIS — Z71.89 OTHER SPECIFIED COUNSELING: ICD-10-CM

## 2021-09-21 DIAGNOSIS — D18.0 HEMANGIOMA: ICD-10-CM

## 2021-09-21 DIAGNOSIS — I78.1 NEVUS, NON-NEOPLASTIC: ICD-10-CM

## 2021-09-21 DIAGNOSIS — Z80.8 FAMILY HISTORY OF MALIGNANT NEOPLASM OF OTHER ORGANS OR SYSTEMS: ICD-10-CM

## 2021-09-21 DIAGNOSIS — L91.8 OTHER HYPERTROPHIC DISORDERS OF THE SKIN: ICD-10-CM

## 2021-09-21 DIAGNOSIS — Z85.828 PERSONAL HISTORY OF OTHER MALIGNANT NEOPLASM OF SKIN: ICD-10-CM

## 2021-09-21 DIAGNOSIS — L82.1 OTHER SEBORRHEIC KERATOSIS: ICD-10-CM

## 2021-09-21 PROBLEM — F32.9 MAJOR DEPRESSIVE DISORDER, SINGLE EPISODE, UNSPECIFIED: Status: ACTIVE | Noted: 2021-09-21

## 2021-09-21 PROBLEM — D22.61 MELANOCYTIC NEVI OF RIGHT UPPER LIMB, INCLUDING SHOULDER: Status: ACTIVE | Noted: 2021-09-21

## 2021-09-21 PROBLEM — D18.01 HEMANGIOMA OF SKIN AND SUBCUTANEOUS TISSUE: Status: ACTIVE | Noted: 2021-09-21

## 2021-09-21 PROBLEM — D22.72 MELANOCYTIC NEVI OF LEFT LOWER LIMB, INCLUDING HIP: Status: ACTIVE | Noted: 2021-09-21

## 2021-09-21 PROBLEM — D22.62 MELANOCYTIC NEVI OF LEFT UPPER LIMB, INCLUDING SHOULDER: Status: ACTIVE | Noted: 2021-09-21

## 2021-09-21 PROBLEM — D22.5 MELANOCYTIC NEVI OF TRUNK: Status: ACTIVE | Noted: 2021-09-21

## 2021-09-21 PROCEDURE — ? OTHER

## 2021-09-21 PROCEDURE — 17110 DESTRUCTION B9 LES UP TO 14: CPT

## 2021-09-21 PROCEDURE — ? BENIGN DESTRUCTION

## 2021-09-21 PROCEDURE — 99213 OFFICE O/P EST LOW 20 MIN: CPT | Mod: 25

## 2021-09-21 PROCEDURE — ? COUNSELING

## 2021-09-21 PROCEDURE — ? SUNSCREEN RECOMMENDATIONS

## 2021-09-21 ASSESSMENT — LOCATION DETAILED DESCRIPTION DERM
LOCATION DETAILED: LEFT PROXIMAL CALF
LOCATION DETAILED: LEFT MEDIAL SUPERIOR CHEST
LOCATION DETAILED: LEFT VENTRAL PROXIMAL FOREARM
LOCATION DETAILED: RIGHT VENTRAL PROXIMAL FOREARM
LOCATION DETAILED: RIGHT MEDIAL TRAPEZIAL NECK
LOCATION DETAILED: RIGHT LATERAL SUPERIOR CHEST
LOCATION DETAILED: RIGHT ANTERIOR DISTAL THIGH
LOCATION DETAILED: LEFT ANTERIOR PROXIMAL THIGH
LOCATION DETAILED: RIGHT NASOLABIAL FOLD
LOCATION DETAILED: RIGHT MID-UPPER BACK
LOCATION DETAILED: LEFT RIB CAGE
LOCATION DETAILED: RIGHT LATERAL TRAPEZIAL NECK
LOCATION DETAILED: LEFT ANTERIOR DISTAL UPPER ARM
LOCATION DETAILED: LEFT MEDIAL CANTHUS
LOCATION DETAILED: LEFT SUPERIOR MEDIAL MIDBACK
LOCATION DETAILED: RIGHT ANTERIOR DISTAL UPPER ARM
LOCATION DETAILED: LEFT MID-UPPER BACK
LOCATION DETAILED: RIGHT UPPER CUTANEOUS LIP
LOCATION DETAILED: LEFT MEDIAL TRAPEZIAL NECK
LOCATION DETAILED: EPIGASTRIC SKIN

## 2021-09-21 ASSESSMENT — LOCATION SIMPLE DESCRIPTION DERM
LOCATION SIMPLE: POSTERIOR NECK
LOCATION SIMPLE: LEFT THIGH
LOCATION SIMPLE: LEFT EYELID
LOCATION SIMPLE: LEFT LOWER BACK
LOCATION SIMPLE: LEFT CALF
LOCATION SIMPLE: CHEST
LOCATION SIMPLE: ABDOMEN
LOCATION SIMPLE: RIGHT UPPER ARM
LOCATION SIMPLE: RIGHT UPPER BACK
LOCATION SIMPLE: LEFT UPPER BACK
LOCATION SIMPLE: RIGHT FOREARM
LOCATION SIMPLE: RIGHT LIP
LOCATION SIMPLE: LEFT UPPER ARM
LOCATION SIMPLE: LEFT FOREARM
LOCATION SIMPLE: RIGHT THIGH

## 2021-09-21 ASSESSMENT — LOCATION ZONE DERM
LOCATION ZONE: LIP
LOCATION ZONE: NECK
LOCATION ZONE: LEG
LOCATION ZONE: TRUNK
LOCATION ZONE: EYELID
LOCATION ZONE: ARM

## 2021-09-21 NOTE — HPI: SKIN LESIONS
How Severe Is Your Skin Lesion?: mild
Have Your Skin Lesions Been Treated?: not been treated
Is This A New Presentation, Or A Follow-Up?: Skin Lesion
Which Family Member (Optional)?: Father
Additional History: Patient stated she has a lesion above her lip that is bothersome and some skin tags she would like removed.

## 2021-09-21 NOTE — PROCEDURE: OTHER
Render Risk Assessment In Note?: yes
Detail Level: Zone
Note Text (......Xxx Chief Complaint.): This diagnosis correlates with the
Other (Free Text): Patient okay with running through insurance as shes hit her deductible. Also okay with not sending in for pathology
Other (Free Text): Compared to photos. Lesions appears stable.
Other (Free Text): Lesions treated on low setting 1.3

## 2021-09-21 NOTE — PROCEDURE: BENIGN DESTRUCTION
Include Z78.9 (Other Specified Conditions Influencing Health Status) As An Associated Diagnosis?: No
Medical Necessity Clause: This procedure was medically necessary because the lesions that were treated were:
Treatment Number (Will Not Render If 0): 0
Post-Care Instructions: I reviewed with the patient in detail post-care instructions. Patient is to wear sunprotection, and avoid picking at any of the treated lesions. Pt may apply Vaseline to crusted or scabbing areas.
Consent: The patient's consent was obtained including but not limited to risks of crusting, scabbing, blistering, scarring, darker or lighter pigmentary change, recurrence, incomplete removal and infection.
Bill Insurance (You Assume Risk Of Denial Or Audit By Selecting Yes): Yes
Anesthesia Type: 2% lidocaine with epinephrine
Anesthesia Volume In Cc: 0.5
Detail Level: Zone
Detail Level: Detailed
Medical Necessity Information: It is in your best interest to select a reason for this procedure from the list below. All of these items fulfill various CMS LCD requirements except the new and changing color options.

## 2021-09-22 ENCOUNTER — HOSPITAL ENCOUNTER (OUTPATIENT)
Dept: INTERVENTIONAL RADIOLOGY/VASCULAR | Age: 41
Discharge: HOME | End: 2021-09-22
Attending: INTERNAL MEDICINE
Payer: COMMERCIAL

## 2021-09-22 VITALS
WEIGHT: 113 LBS | OXYGEN SATURATION: 100 % | TEMPERATURE: 98.1 F | HEIGHT: 61 IN | RESPIRATION RATE: 18 BRPM | DIASTOLIC BLOOD PRESSURE: 54 MMHG | BODY MASS INDEX: 21.34 KG/M2 | SYSTOLIC BLOOD PRESSURE: 99 MMHG | HEART RATE: 96 BPM

## 2021-09-22 DIAGNOSIS — K74.69 OTHER CIRRHOSIS OF LIVER (HCC): ICD-10-CM

## 2021-09-22 PROCEDURE — 77030014146 HC TY THORCENT/PARACENT BD -B

## 2021-09-22 PROCEDURE — 77030010507 HC ADH SKN DERMBND J&J -B

## 2021-09-22 PROCEDURE — P9047 ALBUMIN (HUMAN), 25%, 50ML: HCPCS | Performed by: PHYSICIAN ASSISTANT

## 2021-09-22 PROCEDURE — 74011250636 HC RX REV CODE- 250/636: Performed by: PHYSICIAN ASSISTANT

## 2021-09-22 PROCEDURE — 49083 ABD PARACENTESIS W/IMAGING: CPT

## 2021-09-22 RX ORDER — ALBUMIN HUMAN 250 G/1000ML
12.5 SOLUTION INTRAVENOUS ONCE
Status: COMPLETED | OUTPATIENT
Start: 2021-09-22 | End: 2021-09-22

## 2021-09-22 RX ADMIN — ALBUMIN (HUMAN) 12.5 G: 0.25 INJECTION, SOLUTION INTRAVENOUS at 13:34

## 2021-09-22 NOTE — DISCHARGE INSTRUCTIONS
[address]  Department of Interventional Radiology  [facility]  [phone] Office  [phone] Fax    PARACENTESIS DISCHARGE INSTRUCTIONS    General Information:  During this procedure, the doctor will insert a needle into the abdomen to drain fluid. After the procedure, you will be able to take a deep breath much easier. The site of the puncture may ooze the first day. This will decrease and eventually stop. Paracentesis (draining fluid from the abdomen) sometimes makes patients hypotensive (low blood pressure). Your doctor may order for you to receive fluids or albumin (a volume booster) during the procedure through an IV site. Home Care Instructions:  Keep the puncture site clean and dry. No tub baths or swimming until puncture site heals. Showering is acceptable. Resume your normal diet, and resume your normal activity slowly and as you tolerate. If you are short of breath, rest. If shortness of breath does not ease, please call your ordering doctor. Fluid can re-accumulate in the chest and/or in the abdomen. If this should occur, your doctor needs to know as you may need to have the procedure done again. Call If:     You should call your Physician and/or the Radiology Nurse if you notice any signs of infection, like pus draining, or if it is swollen or reddened. Also call if you have a fever, or if you are bleeding from the puncture site more than a small amount on the dressing. Call if the puncture site keeps draining fluid. Some oozing is to be expected, but should slow and then stop. Call if you feel like you have pressure in your abdomen. SEEK IMMEDIATE CARE OR CALL 911 IF YOU SUDDENLY HAVE TROUBLE BREATHING, OR IF YOUR LIPS TURN BLUE, OR IF YOU NOTICE BLOOD IN YOUR SPUTUM. Follow-Up Instructions: Please see your ordering doctor as he/she has requested. To Reach Us:   If you have any questions about your procedure, please call the Interventional Radiology department at [phone]. After business hours (5pm) and weekends, call the answering service at [phone] and ask for the Radiologist on call to be paged. Si tiene Preguntas acerca del procedimiento, por favor llame al departamento de Radiología Intervencional al [phone]. Después de horas de oficina (5 pm) y los fines de Alexis, llamar al Laura Fercho Danielle al [phone] y pregunte por el Radiologo de Maira Beck. Interventional Radiology General Nurse Discharge    After general anesthesia or intravenous sedation, for 24 hours or while taking prescription Narcotics:  · Limit your activities  · Do not drive and operate hazardous machinery  · Do not make important personal or business decisions  · Do  not drink alcoholic beverages  · If you have not urinated within 8 hours after discharge, please contact your surgeon on call. * Please give a list of your current medications to your Primary Care Provider. * Please update this list whenever your medications are discontinued, doses are     changed, or new medications (including over-the-counter products) are added. * Please carry medication information at all times in case of emergency situations. These are general instructions for a healthy lifestyle:    No smoking/ No tobacco products/ Avoid exposure to second hand smoke  Surgeon General's Warning:  Quitting smoking now greatly reduces serious risk to your health. Obesity, smoking, and sedentary lifestyle greatly increases your risk for illness  A healthy diet, regular physical exercise & weight monitoring are important for maintaining a healthy lifestyle    You may be retaining fluid if you have a history of heart failure or if you experience any of the following symptoms:  Weight gain of 3 pounds or more overnight or 5 pounds in a week, increased swelling in our hands or feet or shortness of breath while lying flat in bed.   Please call your doctor as soon as you notice any of these symptoms; do not wait until your next office visit. Recognize signs and symptoms of STROKE:  F-face looks uneven    A-arms unable to move or move unevenly    S-speech slurred or non-existent    T-time-call 911 as soon as signs and symptoms begin-DO NOT go       Back to bed or wait to see if you get better-TIME IS BRAIN.

## 2021-09-22 NOTE — PROGRESS NOTES
Interventional Radiology Post Paracentesis/Thoracentesis Note    9/22/2021    Procedure(s): Ultrasound guided Therapeutic Paracentesis Performed with 8 [place]araguan catheter total volume 5200 ml. Preliminary Findings: moderate clear and yellow. Complications: None    Plan:  Observation, check labs if drawn.           Chest X-Ray:  no    Full dictated report to follow

## 2021-09-22 NOTE — PROGRESS NOTES
Discharge instructions gone over with pt at bedside. Able to verbalize understandinng and all questions answered. Paper copy signed and placed with patient's physical chart.

## 2021-10-01 ENCOUNTER — HOSPITAL ENCOUNTER (OUTPATIENT)
Dept: INTERVENTIONAL RADIOLOGY/VASCULAR | Age: 41
Discharge: HOME | End: 2021-10-01
Attending: INTERNAL MEDICINE
Payer: COMMERCIAL

## 2021-10-01 VITALS
OXYGEN SATURATION: 100 % | DIASTOLIC BLOOD PRESSURE: 49 MMHG | SYSTOLIC BLOOD PRESSURE: 92 MMHG | HEART RATE: 99 BPM | RESPIRATION RATE: 16 BRPM

## 2021-10-01 DIAGNOSIS — K74.69 OTHER CIRRHOSIS OF LIVER (HCC): ICD-10-CM

## 2021-10-01 LAB
APPEARANCE FLD: CLEAR
COLOR FLD: YELLOW
NUC CELL # FLD: <100 /CU MM
RBC # FLD: <1000 /CU MM
SPECIMEN SOURCE FLD: NORMAL

## 2021-10-01 PROCEDURE — 89050 BODY FLUID CELL COUNT: CPT

## 2021-10-01 PROCEDURE — 74011250636 HC RX REV CODE- 250/636: Performed by: RADIOLOGY

## 2021-10-01 PROCEDURE — 77030010507 HC ADH SKN DERMBND J&J -B

## 2021-10-01 PROCEDURE — P9047 ALBUMIN (HUMAN), 25%, 50ML: HCPCS | Performed by: RADIOLOGY

## 2021-10-01 PROCEDURE — 77030014146 HC TY THORCENT/PARACENT BD -B

## 2021-10-01 PROCEDURE — 49083 ABD PARACENTESIS W/IMAGING: CPT

## 2021-10-01 RX ORDER — ALBUMIN HUMAN 250 G/1000ML
12.5-5 SOLUTION INTRAVENOUS
Status: DISCONTINUED | OUTPATIENT
Start: 2021-10-01 | End: 2021-10-05 | Stop reason: HOSPADM

## 2021-10-01 RX ADMIN — ALBUMIN (HUMAN) 12.5 G: 0.25 INJECTION, SOLUTION INTRAVENOUS at 10:43

## 2021-10-01 NOTE — PROGRESS NOTES
Interventional Radiology Post Paracentesis/Thoracentesis Note    10/1/2021    Procedure(s): Ultrasound guided Therapeutic Paracentesis Performed with 8 Cypriot catheter total volume 5510 ml. Samples sent to lab. Preliminary Findings: large clear and yellow. Complications: None    Plan:  Observation, check labs if drawn.           Chest X-Ray:  no    Full dictated report to follow

## 2021-10-01 NOTE — DISCHARGE INSTRUCTIONS
[address]  Department of Interventional Radiology  [facility]  [phone] Office  [phone] Fax    PARACENTESIS DISCHARGE INSTRUCTIONS    General Information:  During this procedure, the doctor will insert a needle into the abdomen to drain fluid. After the procedure, you will be able to take a deep breath much easier. The site of the puncture may ooze the first day. This will decrease and eventually stop. Paracentesis (draining fluid from the abdomen) sometimes makes patients hypotensive (low blood pressure). Your doctor may order for you to receive fluids or albumin (a volume booster) during the procedure through an IV site. Home Care Instructions:  Keep the puncture site clean and dry. No tub baths or swimming until puncture site heals. Showering is acceptable. Resume your normal diet, and resume your normal activity slowly and as you tolerate. If you are short of breath, rest. If shortness of breath does not ease, please call your ordering doctor. Fluid can re-accumulate in the chest and/or in the abdomen. If this should occur, your doctor needs to know as you may need to have the procedure done again. Call If:     You should call your Physician and/or the Radiology Nurse if you notice any signs of infection, like pus draining, or if it is swollen or reddened. Also call if you have a fever, or if you are bleeding from the puncture site more than a small amount on the dressing. Call if the puncture site keeps draining fluid. Some oozing is to be expected, but should slow and then stop. Call if you feel like you have pressure in your abdomen. SEEK IMMEDIATE CARE OR CALL 911 IF YOU SUDDENLY HAVE TROUBLE BREATHING, OR IF YOUR LIPS TURN BLUE, OR IF YOU NOTICE BLOOD IN YOUR SPUTUM. Follow-Up Instructions: Please see your ordering doctor as he/she has requested. To Reach Us:   If you have any questions about your procedure, please call the Interventional Radiology department at [phone]. After business hours (5pm) and weekends, call the answering service at [phone] and ask for the Radiologist on call to be paged. Interventional Radiology General Nurse Discharge    After general anesthesia or intravenous sedation, for 24 hours or while taking prescription Narcotics:  · Limit your activities  · Do not drive and operate hazardous machinery  · Do not make important personal or business decisions  · Do  not drink alcoholic beverages  · If you have not urinated within 8 hours after discharge, please contact your surgeon on call. * Please give a list of your current medications to your Primary Care Provider. * Please update this list whenever your medications are discontinued, doses are     changed, or new medications (including over-the-counter products) are added. * Please carry medication information at all times in case of emergency situations. These are general instructions for a healthy lifestyle:    No smoking/ No tobacco products/ Avoid exposure to second hand smoke  Surgeon General's Warning:  Quitting smoking now greatly reduces serious risk to your health. Obesity, smoking, and sedentary lifestyle greatly increases your risk for illness  A healthy diet, regular physical exercise & weight monitoring are important for maintaining a healthy lifestyle    You may be retaining fluid if you have a history of heart failure or if you experience any of the following symptoms:  Weight gain of 3 pounds or more overnight or 5 pounds in a week, increased swelling in our hands or feet or shortness of breath while lying flat in bed. Please call your doctor as soon as you notice any of these symptoms; do not wait until your next office visit.     Recognize signs and symptoms of STROKE:  F-face looks uneven    A-arms unable to move or move unevenly    S-speech slurred or non-existent    T-time-call 911 as soon as signs and symptoms begin-DO NOT go       Back to bed or wait to see if you get better-TIME IS BRAIN.           Date: 10/1/2021  Discharging Nurse: Sahil Bhatia

## 2022-12-07 ENCOUNTER — APPOINTMENT (RX ONLY)
Dept: URBAN - METROPOLITAN AREA CLINIC 330 | Facility: CLINIC | Age: 42
Setting detail: DERMATOLOGY
End: 2022-12-07

## 2022-12-07 DIAGNOSIS — D18.0 HEMANGIOMA: ICD-10-CM

## 2022-12-07 DIAGNOSIS — D22 MELANOCYTIC NEVI: ICD-10-CM | Status: STABLE

## 2022-12-07 DIAGNOSIS — Z85.828 PERSONAL HISTORY OF OTHER MALIGNANT NEOPLASM OF SKIN: ICD-10-CM

## 2022-12-07 DIAGNOSIS — Z80.8 FAMILY HISTORY OF MALIGNANT NEOPLASM OF OTHER ORGANS OR SYSTEMS: ICD-10-CM

## 2022-12-07 DIAGNOSIS — L57.8 OTHER SKIN CHANGES DUE TO CHRONIC EXPOSURE TO NONIONIZING RADIATION: ICD-10-CM

## 2022-12-07 DIAGNOSIS — Z94.4 LIVER TRANSPLANT STATUS: ICD-10-CM

## 2022-12-07 PROBLEM — D18.01 HEMANGIOMA OF SKIN AND SUBCUTANEOUS TISSUE: Status: ACTIVE | Noted: 2022-12-07

## 2022-12-07 PROBLEM — D22.62 MELANOCYTIC NEVI OF LEFT UPPER LIMB, INCLUDING SHOULDER: Status: ACTIVE | Noted: 2022-12-07

## 2022-12-07 PROBLEM — D22.72 MELANOCYTIC NEVI OF LEFT LOWER LIMB, INCLUDING HIP: Status: ACTIVE | Noted: 2022-12-07

## 2022-12-07 PROBLEM — D22.61 MELANOCYTIC NEVI OF RIGHT UPPER LIMB, INCLUDING SHOULDER: Status: ACTIVE | Noted: 2022-12-07

## 2022-12-07 PROBLEM — D22.5 MELANOCYTIC NEVI OF TRUNK: Status: ACTIVE | Noted: 2022-12-07

## 2022-12-07 PROCEDURE — 99213 OFFICE O/P EST LOW 20 MIN: CPT

## 2022-12-07 PROCEDURE — ? TREATMENT REGIMEN

## 2022-12-07 PROCEDURE — ? OTHER

## 2022-12-07 PROCEDURE — ? FULL BODY SKIN EXAM

## 2022-12-07 PROCEDURE — ? COUNSELING

## 2022-12-07 ASSESSMENT — LOCATION ZONE DERM
LOCATION ZONE: LEG
LOCATION ZONE: EYELID
LOCATION ZONE: TRUNK
LOCATION ZONE: ARM

## 2022-12-07 ASSESSMENT — LOCATION DETAILED DESCRIPTION DERM
LOCATION DETAILED: LEFT ANTERIOR PROXIMAL THIGH
LOCATION DETAILED: RIGHT ANTERIOR DISTAL UPPER ARM
LOCATION DETAILED: LEFT SUPERIOR MEDIAL MIDBACK
LOCATION DETAILED: RIGHT LATERAL SUPERIOR EYELID
LOCATION DETAILED: LEFT VENTRAL PROXIMAL FOREARM
LOCATION DETAILED: EPIGASTRIC SKIN
LOCATION DETAILED: LEFT MEDIAL SUPERIOR CHEST
LOCATION DETAILED: LEFT PROXIMAL CALF

## 2022-12-07 ASSESSMENT — LOCATION SIMPLE DESCRIPTION DERM
LOCATION SIMPLE: CHEST
LOCATION SIMPLE: LEFT LOWER BACK
LOCATION SIMPLE: RIGHT UPPER ARM
LOCATION SIMPLE: LEFT FOREARM
LOCATION SIMPLE: LEFT THIGH
LOCATION SIMPLE: LEFT CALF
LOCATION SIMPLE: ABDOMEN
LOCATION SIMPLE: RIGHT SUPERIOR EYELID

## 2022-12-07 NOTE — PROCEDURE: OTHER
Note Text (......Xxx Chief Complaint.): This diagnosis correlates with the
Other (Free Text): Compared to photos. Lesions appears stable.
Detail Level: Zone

## 2023-06-13 ENCOUNTER — APPOINTMENT (RX ONLY)
Dept: URBAN - METROPOLITAN AREA CLINIC 330 | Facility: CLINIC | Age: 43
Setting detail: DERMATOLOGY
End: 2023-06-13

## 2023-06-13 DIAGNOSIS — Z80.8 FAMILY HISTORY OF MALIGNANT NEOPLASM OF OTHER ORGANS OR SYSTEMS: ICD-10-CM

## 2023-06-13 DIAGNOSIS — L85.3 XEROSIS CUTIS: ICD-10-CM

## 2023-06-13 DIAGNOSIS — L82.1 OTHER SEBORRHEIC KERATOSIS: ICD-10-CM

## 2023-06-13 DIAGNOSIS — D22 MELANOCYTIC NEVI: ICD-10-CM | Status: STABLE

## 2023-06-13 DIAGNOSIS — L57.8 OTHER SKIN CHANGES DUE TO CHRONIC EXPOSURE TO NONIONIZING RADIATION: ICD-10-CM

## 2023-06-13 DIAGNOSIS — Z94.4 LIVER TRANSPLANT STATUS: ICD-10-CM

## 2023-06-13 DIAGNOSIS — Z85.828 PERSONAL HISTORY OF OTHER MALIGNANT NEOPLASM OF SKIN: ICD-10-CM

## 2023-06-13 DIAGNOSIS — D18.0 HEMANGIOMA: ICD-10-CM

## 2023-06-13 PROBLEM — D22.5 MELANOCYTIC NEVI OF TRUNK: Status: ACTIVE | Noted: 2023-06-13

## 2023-06-13 PROBLEM — D18.01 HEMANGIOMA OF SKIN AND SUBCUTANEOUS TISSUE: Status: ACTIVE | Noted: 2023-06-13

## 2023-06-13 PROBLEM — D22.62 MELANOCYTIC NEVI OF LEFT UPPER LIMB, INCLUDING SHOULDER: Status: ACTIVE | Noted: 2023-06-13

## 2023-06-13 PROBLEM — D22.61 MELANOCYTIC NEVI OF RIGHT UPPER LIMB, INCLUDING SHOULDER: Status: ACTIVE | Noted: 2023-06-13

## 2023-06-13 PROBLEM — D22.72 MELANOCYTIC NEVI OF LEFT LOWER LIMB, INCLUDING HIP: Status: ACTIVE | Noted: 2023-06-13

## 2023-06-13 PROCEDURE — ? FULL BODY SKIN EXAM

## 2023-06-13 PROCEDURE — ? OTHER

## 2023-06-13 PROCEDURE — ? COUNSELING

## 2023-06-13 PROCEDURE — ? TREATMENT REGIMEN

## 2023-06-13 PROCEDURE — ? MEDICAL PHOTOGRAPHY REVIEW

## 2023-06-13 PROCEDURE — 99213 OFFICE O/P EST LOW 20 MIN: CPT

## 2023-06-13 ASSESSMENT — LOCATION ZONE DERM
LOCATION ZONE: TRUNK
LOCATION ZONE: ARM
LOCATION ZONE: EYELID
LOCATION ZONE: LEG

## 2023-06-13 ASSESSMENT — LOCATION SIMPLE DESCRIPTION DERM
LOCATION SIMPLE: RIGHT SUPERIOR EYELID
LOCATION SIMPLE: ABDOMEN
LOCATION SIMPLE: LEFT THIGH
LOCATION SIMPLE: LEFT FOREARM
LOCATION SIMPLE: RIGHT UPPER ARM
LOCATION SIMPLE: UPPER BACK
LOCATION SIMPLE: CHEST
LOCATION SIMPLE: LEFT LOWER BACK
LOCATION SIMPLE: LEFT CALF

## 2023-06-13 ASSESSMENT — LOCATION DETAILED DESCRIPTION DERM
LOCATION DETAILED: INFERIOR THORACIC SPINE
LOCATION DETAILED: LEFT VENTRAL PROXIMAL FOREARM
LOCATION DETAILED: LEFT SUPERIOR MEDIAL MIDBACK
LOCATION DETAILED: RIGHT ANTERIOR DISTAL UPPER ARM
LOCATION DETAILED: EPIGASTRIC SKIN
LOCATION DETAILED: LEFT PROXIMAL CALF
LOCATION DETAILED: RIGHT LATERAL SUPERIOR EYELID
LOCATION DETAILED: LEFT MEDIAL SUPERIOR CHEST
LOCATION DETAILED: LEFT ANTERIOR PROXIMAL THIGH

## 2023-06-13 NOTE — PROCEDURE: OTHER
Detail Level: Generalized
Other (Free Text): Patient consent was obtained to proceed with the visit and recommended plan of care after discussion of all risks and benefits, including the risks of COVID-19 exposure.
Render Risk Assessment In Note?: yes
Note Text (......Xxx Chief Complaint.): This diagnosis correlates with the
Detail Level: Simple
Other (Free Text): Offered LN2, patient declined.

## 2023-12-07 ENCOUNTER — APPOINTMENT (RX ONLY)
Dept: URBAN - METROPOLITAN AREA CLINIC 330 | Facility: CLINIC | Age: 43
Setting detail: DERMATOLOGY
End: 2023-12-07

## 2023-12-07 DIAGNOSIS — Z85.828 PERSONAL HISTORY OF OTHER MALIGNANT NEOPLASM OF SKIN: ICD-10-CM

## 2023-12-07 DIAGNOSIS — Z80.8 FAMILY HISTORY OF MALIGNANT NEOPLASM OF OTHER ORGANS OR SYSTEMS: ICD-10-CM

## 2023-12-07 DIAGNOSIS — B07.8 OTHER VIRAL WARTS: ICD-10-CM

## 2023-12-07 DIAGNOSIS — Z94.4 LIVER TRANSPLANT STATUS: ICD-10-CM

## 2023-12-07 DIAGNOSIS — L82.0 INFLAMED SEBORRHEIC KERATOSIS: ICD-10-CM

## 2023-12-07 DIAGNOSIS — L57.8 OTHER SKIN CHANGES DUE TO CHRONIC EXPOSURE TO NONIONIZING RADIATION: ICD-10-CM

## 2023-12-07 DIAGNOSIS — L82.1 OTHER SEBORRHEIC KERATOSIS: ICD-10-CM

## 2023-12-07 DIAGNOSIS — D22 MELANOCYTIC NEVI: ICD-10-CM | Status: STABLE

## 2023-12-07 PROBLEM — D22.5 MELANOCYTIC NEVI OF TRUNK: Status: ACTIVE | Noted: 2023-12-07

## 2023-12-07 PROCEDURE — 99213 OFFICE O/P EST LOW 20 MIN: CPT | Mod: 25

## 2023-12-07 PROCEDURE — ? COUNSELING

## 2023-12-07 PROCEDURE — ? FULL BODY SKIN EXAM

## 2023-12-07 PROCEDURE — ? OTHER

## 2023-12-07 PROCEDURE — ? TREATMENT REGIMEN

## 2023-12-07 PROCEDURE — 17110 DESTRUCTION B9 LES UP TO 14: CPT

## 2023-12-07 PROCEDURE — ? LIQUID NITROGEN

## 2023-12-07 PROCEDURE — ? MEDICAL PHOTOGRAPHY REVIEW

## 2023-12-07 ASSESSMENT — LOCATION ZONE DERM
LOCATION ZONE: HAND
LOCATION ZONE: ARM
LOCATION ZONE: TRUNK

## 2023-12-07 ASSESSMENT — LOCATION SIMPLE DESCRIPTION DERM
LOCATION SIMPLE: RIGHT HAND
LOCATION SIMPLE: CHEST
LOCATION SIMPLE: ABDOMEN
LOCATION SIMPLE: LEFT LOWER BACK
LOCATION SIMPLE: RIGHT SHOULDER
LOCATION SIMPLE: UPPER BACK

## 2023-12-07 ASSESSMENT — LOCATION DETAILED DESCRIPTION DERM
LOCATION DETAILED: RIGHT ANTERIOR SHOULDER
LOCATION DETAILED: LEFT MEDIAL SUPERIOR CHEST
LOCATION DETAILED: RIGHT RADIAL PALM
LOCATION DETAILED: INFERIOR THORACIC SPINE
LOCATION DETAILED: LEFT SUPERIOR MEDIAL MIDBACK
LOCATION DETAILED: EPIGASTRIC SKIN

## 2023-12-07 NOTE — PROCEDURE: LIQUID NITROGEN
Show Aperture Variable?: Yes
Post-Care Instructions: I reviewed with the patient in detail post-care instructions. Patient is to wear sunprotection, and avoid picking at any of the treated lesions. Pt may apply Vaseline to crusted or scabbing areas.
Consent: The patient's consent was obtained including but not limited to risks of crusting, scabbing, blistering, scarring, darker or lighter pigmentary change, recurrence, incomplete removal and infection.
Detail Level: Detailed
Medical Necessity Information: It is in your best interest to select a reason for this procedure from the list below. All of these items fulfill various CMS LCD requirements except the new and changing color options.
Add 52 Modifier (Optional): no
Medical Necessity Clause: This procedure was medically necessary because the lesions that were treated were:
Spray Paint Text: The liquid nitrogen was applied to the skin utilizing a spray paint frosting technique.

## 2024-06-20 ENCOUNTER — APPOINTMENT (RX ONLY)
Dept: URBAN - METROPOLITAN AREA CLINIC 330 | Facility: CLINIC | Age: 44
Setting detail: DERMATOLOGY
End: 2024-06-20

## 2024-06-20 DIAGNOSIS — Z85.828 PERSONAL HISTORY OF OTHER MALIGNANT NEOPLASM OF SKIN: ICD-10-CM

## 2024-06-20 DIAGNOSIS — L57.8 OTHER SKIN CHANGES DUE TO CHRONIC EXPOSURE TO NONIONIZING RADIATION: ICD-10-CM

## 2024-06-20 DIAGNOSIS — Z94.4 LIVER TRANSPLANT STATUS: ICD-10-CM

## 2024-06-20 DIAGNOSIS — D22 MELANOCYTIC NEVI: ICD-10-CM | Status: STABLE

## 2024-06-20 DIAGNOSIS — L72.8 OTHER FOLLICULAR CYSTS OF THE SKIN AND SUBCUTANEOUS TISSUE: ICD-10-CM

## 2024-06-20 DIAGNOSIS — Z80.8 FAMILY HISTORY OF MALIGNANT NEOPLASM OF OTHER ORGANS OR SYSTEMS: ICD-10-CM

## 2024-06-20 PROBLEM — D48.5 NEOPLASM OF UNCERTAIN BEHAVIOR OF SKIN: Status: ACTIVE | Noted: 2024-06-20

## 2024-06-20 PROBLEM — D22.5 MELANOCYTIC NEVI OF TRUNK: Status: ACTIVE | Noted: 2024-06-20

## 2024-06-20 PROCEDURE — ? MEDICAL PHOTOGRAPHY REVIEW

## 2024-06-20 PROCEDURE — ? OTHER

## 2024-06-20 PROCEDURE — ? TREATMENT REGIMEN

## 2024-06-20 PROCEDURE — 99213 OFFICE O/P EST LOW 20 MIN: CPT

## 2024-06-20 PROCEDURE — ? FULL BODY SKIN EXAM

## 2024-06-20 PROCEDURE — ? COUNSELING

## 2024-06-20 PROCEDURE — ? PHOTO-DOCUMENTATION

## 2024-06-20 ASSESSMENT — LOCATION SIMPLE DESCRIPTION DERM
LOCATION SIMPLE: UPPER BACK
LOCATION SIMPLE: ABDOMEN
LOCATION SIMPLE: CHEST
LOCATION SIMPLE: LEFT LOWER BACK
LOCATION SIMPLE: RIGHT INFERIOR EYELID

## 2024-06-20 ASSESSMENT — LOCATION ZONE DERM
LOCATION ZONE: EYELID
LOCATION ZONE: TRUNK

## 2024-06-20 ASSESSMENT — LOCATION DETAILED DESCRIPTION DERM
LOCATION DETAILED: INFERIOR THORACIC SPINE
LOCATION DETAILED: LEFT MEDIAL SUPERIOR CHEST
LOCATION DETAILED: EPIGASTRIC SKIN
LOCATION DETAILED: RIGHT LATERAL INFERIOR EYELID
LOCATION DETAILED: LEFT SUPERIOR MEDIAL MIDBACK

## 2024-06-20 NOTE — PROCEDURE: MIPS QUALITY
Mr Ndiaye
Quality 226: Preventive Care And Screening: Tobacco Use: Screening And Cessation Intervention: Patient screened for tobacco use and is an ex/non-smoker
Quality 431: Preventive Care And Screening: Unhealthy Alcohol Use - Screening: Patient not identified as an unhealthy alcohol user when screened for unhealthy alcohol use using a systematic screening method
Detail Level: Detailed
Quality 130: Documentation Of Current Medications In The Medical Record: Current Medications Documented
Quality 402: Tobacco Use And Help With Quitting Among Adolescents: Patient screened for tobacco and never smoked

## 2025-01-02 ENCOUNTER — APPOINTMENT (OUTPATIENT)
Dept: URBAN - METROPOLITAN AREA CLINIC 330 | Facility: CLINIC | Age: 45
Setting detail: DERMATOLOGY
End: 2025-01-02

## 2025-01-02 DIAGNOSIS — Z80.8 FAMILY HISTORY OF MALIGNANT NEOPLASM OF OTHER ORGANS OR SYSTEMS: ICD-10-CM

## 2025-01-02 DIAGNOSIS — L82.0 INFLAMED SEBORRHEIC KERATOSIS: ICD-10-CM

## 2025-01-02 DIAGNOSIS — Z94.4 LIVER TRANSPLANT STATUS: ICD-10-CM

## 2025-01-02 DIAGNOSIS — B07.8 OTHER VIRAL WARTS: ICD-10-CM

## 2025-01-02 DIAGNOSIS — D22 MELANOCYTIC NEVI: ICD-10-CM | Status: STABLE

## 2025-01-02 DIAGNOSIS — Z85.828 PERSONAL HISTORY OF OTHER MALIGNANT NEOPLASM OF SKIN: ICD-10-CM

## 2025-01-02 DIAGNOSIS — L57.8 OTHER SKIN CHANGES DUE TO CHRONIC EXPOSURE TO NONIONIZING RADIATION: ICD-10-CM

## 2025-01-02 PROBLEM — D22.5 MELANOCYTIC NEVI OF TRUNK: Status: ACTIVE | Noted: 2025-01-02

## 2025-01-02 PROCEDURE — ? COUNSELING

## 2025-01-02 PROCEDURE — 99213 OFFICE O/P EST LOW 20 MIN: CPT | Mod: 25

## 2025-01-02 PROCEDURE — 17110 DESTRUCTION B9 LES UP TO 14: CPT

## 2025-01-02 PROCEDURE — ? OTHER

## 2025-01-02 PROCEDURE — ? MEDICAL PHOTOGRAPHY REVIEW

## 2025-01-02 PROCEDURE — ? LIQUID NITROGEN

## 2025-01-02 PROCEDURE — ? FULL BODY SKIN EXAM

## 2025-01-02 PROCEDURE — ? TREATMENT REGIMEN

## 2025-01-02 ASSESSMENT — LOCATION DETAILED DESCRIPTION DERM
LOCATION DETAILED: LEFT SUPERIOR MEDIAL MIDBACK
LOCATION DETAILED: INFERIOR THORACIC SPINE
LOCATION DETAILED: LEFT DISTAL RADIAL PALMAR MIDDLE FINGER
LOCATION DETAILED: LEFT MEDIAL KNEE
LOCATION DETAILED: RIGHT INFERIOR MEDIAL UPPER BACK
LOCATION DETAILED: LEFT LATERAL BREAST 2-3:00 REGION
LOCATION DETAILED: RIGHT POSTERIOR SHOULDER
LOCATION DETAILED: LEFT MEDIAL SUPERIOR CHEST
LOCATION DETAILED: EPIGASTRIC SKIN
LOCATION DETAILED: LEFT SUPERIOR UPPER BACK

## 2025-01-02 ASSESSMENT — LOCATION SIMPLE DESCRIPTION DERM
LOCATION SIMPLE: ABDOMEN
LOCATION SIMPLE: LEFT KNEE
LOCATION SIMPLE: LEFT UPPER BACK
LOCATION SIMPLE: CHEST
LOCATION SIMPLE: UPPER BACK
LOCATION SIMPLE: LEFT MIDDLE FINGER
LOCATION SIMPLE: LEFT BREAST
LOCATION SIMPLE: LEFT LOWER BACK
LOCATION SIMPLE: RIGHT SHOULDER
LOCATION SIMPLE: RIGHT UPPER BACK

## 2025-01-02 ASSESSMENT — LOCATION ZONE DERM
LOCATION ZONE: FINGER
LOCATION ZONE: TRUNK
LOCATION ZONE: ARM
LOCATION ZONE: LEG

## 2025-01-02 NOTE — PROCEDURE: LIQUID NITROGEN
Render Post-Care Instructions In Note?: no
Post-Care Instructions: I reviewed with the patient in detail post-care instructions. Patient is to wear sunprotection, and avoid picking at any of the treated lesions. Pt may apply Vaseline to crusted or scabbing areas.
Spray Paint Text: The liquid nitrogen was applied to the skin utilizing a spray paint frosting technique.
Number Of Freeze-Thaw Cycles: 2 freeze-thaw cycles
Medical Necessity Clause: This procedure was medically necessary because the lesions that were treated were:
Duration Of Freeze Thaw-Cycle (Seconds): 5-10
Application Tool (Optional): Liquid Nitrogen Sprayer
Aperture Size (Optional): C
Consent: The patient's consent was obtained including but not limited to risks of crusting, scabbing, blistering, scarring, darker or lighter pigmentary change, recurrence, incomplete removal and infection.
Medical Necessity Information: It is in your best interest to select a reason for this procedure from the list below. All of these items fulfill various CMS LCD requirements except the new and changing color options.
Show Aperture Variable?: Yes
Detail Level: Detailed

## 2025-01-30 ENCOUNTER — APPOINTMENT (OUTPATIENT)
Dept: URBAN - METROPOLITAN AREA CLINIC 330 | Facility: CLINIC | Age: 45
Setting detail: DERMATOLOGY
End: 2025-01-30

## 2025-01-30 DIAGNOSIS — B07.8 OTHER VIRAL WARTS: ICD-10-CM

## 2025-01-30 DIAGNOSIS — L82.1 OTHER SEBORRHEIC KERATOSIS: ICD-10-CM

## 2025-01-30 DIAGNOSIS — Z80.8 FAMILY HISTORY OF MALIGNANT NEOPLASM OF OTHER ORGANS OR SYSTEMS: ICD-10-CM

## 2025-01-30 PROCEDURE — ? COUNSELING

## 2025-01-30 PROCEDURE — 17110 DESTRUCTION B9 LES UP TO 14: CPT

## 2025-01-30 PROCEDURE — ? LIQUID NITROGEN

## 2025-01-30 PROCEDURE — 99212 OFFICE O/P EST SF 10 MIN: CPT | Mod: 25

## 2025-01-30 ASSESSMENT — LOCATION DETAILED DESCRIPTION DERM
LOCATION DETAILED: RIGHT PROXIMAL RADIAL DORSAL FOREARM
LOCATION DETAILED: LEFT DISTAL RADIAL PALMAR MIDDLE FINGER

## 2025-01-30 ASSESSMENT — LOCATION SIMPLE DESCRIPTION DERM
LOCATION SIMPLE: LEFT MIDDLE FINGER
LOCATION SIMPLE: RIGHT FOREARM

## 2025-01-30 ASSESSMENT — LOCATION ZONE DERM
LOCATION ZONE: ARM
LOCATION ZONE: FINGER

## 2025-07-31 ENCOUNTER — APPOINTMENT (OUTPATIENT)
Dept: URBAN - METROPOLITAN AREA CLINIC 330 | Facility: CLINIC | Age: 45
Setting detail: DERMATOLOGY
End: 2025-07-31

## 2025-07-31 DIAGNOSIS — L57.8 OTHER SKIN CHANGES DUE TO CHRONIC EXPOSURE TO NONIONIZING RADIATION: ICD-10-CM

## 2025-07-31 DIAGNOSIS — Z94.4 LIVER TRANSPLANT STATUS: ICD-10-CM

## 2025-07-31 DIAGNOSIS — Z85.828 PERSONAL HISTORY OF OTHER MALIGNANT NEOPLASM OF SKIN: ICD-10-CM

## 2025-07-31 DIAGNOSIS — D22 MELANOCYTIC NEVI: ICD-10-CM | Status: STABLE

## 2025-07-31 PROBLEM — D22.5 MELANOCYTIC NEVI OF TRUNK: Status: ACTIVE | Noted: 2025-07-31

## 2025-07-31 PROCEDURE — ? COUNSELING

## 2025-07-31 PROCEDURE — ? FULL BODY SKIN EXAM

## 2025-07-31 PROCEDURE — ? TREATMENT REGIMEN

## 2025-07-31 PROCEDURE — ? PHOTO-DOCUMENTATION

## 2025-07-31 PROCEDURE — ? MEDICAL PHOTOGRAPHY REVIEW

## 2025-07-31 ASSESSMENT — LOCATION DETAILED DESCRIPTION DERM
LOCATION DETAILED: LEFT SUPERIOR MEDIAL MIDBACK
LOCATION DETAILED: EPIGASTRIC SKIN
LOCATION DETAILED: INFERIOR THORACIC SPINE
LOCATION DETAILED: LEFT MEDIAL SUPERIOR CHEST

## 2025-07-31 ASSESSMENT — LOCATION SIMPLE DESCRIPTION DERM
LOCATION SIMPLE: ABDOMEN
LOCATION SIMPLE: UPPER BACK
LOCATION SIMPLE: CHEST
LOCATION SIMPLE: LEFT LOWER BACK

## 2025-07-31 ASSESSMENT — LOCATION ZONE DERM: LOCATION ZONE: TRUNK

## (undated) DEVICE — CANNULA NSL ORAL AD FOR CAPNOFLEX CO2 O2 AIRLFE

## (undated) DEVICE — BLOCK BITE AD 60FR W/ VELC STRP ADDRESSES MOST PT AND

## (undated) DEVICE — CONNECTOR TBNG OD5-7MM O2 END DISP

## (undated) DEVICE — CONTAINER PREFIL FRMLN 40ML --

## (undated) DEVICE — KENDALL RADIOLUCENT FOAM MONITORING ELECTRODE RECTANGULAR SHAPE: Brand: KENDALL

## (undated) DEVICE — FORCEPS BX L240CM JAW DIA2.8MM L CAP W/ NDL MIC MESH TOOTH